# Patient Record
Sex: FEMALE | Race: WHITE | NOT HISPANIC OR LATINO | Employment: OTHER | URBAN - METROPOLITAN AREA
[De-identification: names, ages, dates, MRNs, and addresses within clinical notes are randomized per-mention and may not be internally consistent; named-entity substitution may affect disease eponyms.]

---

## 2017-01-10 ENCOUNTER — TRANSFERRED RECORDS (OUTPATIENT)
Dept: HEALTH INFORMATION MANAGEMENT | Facility: CLINIC | Age: 67
End: 2017-01-10

## 2017-01-10 ENCOUNTER — APPOINTMENT (OUTPATIENT)
Dept: VASCULAR SURGERY | Facility: CLINIC | Age: 67
End: 2017-01-10
Payer: COMMERCIAL

## 2017-01-10 PROCEDURE — 99202 OFFICE O/P NEW SF 15 MIN: CPT | Performed by: SURGERY

## 2017-03-22 ENCOUNTER — OFFICE VISIT (OUTPATIENT)
Dept: INTERNAL MEDICINE | Facility: CLINIC | Age: 67
End: 2017-03-22
Payer: COMMERCIAL

## 2017-03-22 VITALS
SYSTOLIC BLOOD PRESSURE: 122 MMHG | TEMPERATURE: 99 F | DIASTOLIC BLOOD PRESSURE: 88 MMHG | HEIGHT: 63 IN | BODY MASS INDEX: 30.02 KG/M2 | WEIGHT: 169.4 LBS | HEART RATE: 88 BPM | OXYGEN SATURATION: 98 %

## 2017-03-22 DIAGNOSIS — R07.0 THROAT PAIN: Primary | ICD-10-CM

## 2017-03-22 DIAGNOSIS — J40 BRONCHITIS: ICD-10-CM

## 2017-03-22 LAB
DEPRECATED S PYO AG THROAT QL EIA: NORMAL
MICRO REPORT STATUS: NORMAL
SPECIMEN SOURCE: NORMAL

## 2017-03-22 PROCEDURE — 87880 STREP A ASSAY W/OPTIC: CPT | Performed by: FAMILY MEDICINE

## 2017-03-22 PROCEDURE — 87081 CULTURE SCREEN ONLY: CPT | Performed by: FAMILY MEDICINE

## 2017-03-22 PROCEDURE — 99213 OFFICE O/P EST LOW 20 MIN: CPT | Performed by: FAMILY MEDICINE

## 2017-03-22 RX ORDER — AZITHROMYCIN 250 MG/1
TABLET, FILM COATED ORAL
Qty: 6 TABLET | Refills: 0 | Status: SHIPPED | OUTPATIENT
Start: 2017-03-22 | End: 2017-08-01

## 2017-03-22 NOTE — NURSING NOTE
"Chief Complaint   Patient presents with     Pharyngitis     Sore throat, coughing, body aches, chest hurts when breathing, x 2 weeks. Was treated for fluid in ears with amoxicillin x 10 days-finished on 3/20/17.       Initial /88 (BP Location: Right arm, Patient Position: Chair, Cuff Size: Adult Large)  Pulse 88  Temp 99  F (37.2  C) (Oral)  Ht 5' 2.5\" (1.588 m)  Wt 169 lb 6.4 oz (76.8 kg)  SpO2 98%  BMI 30.49 kg/m2 Estimated body mass index is 30.49 kg/(m^2) as calculated from the following:    Height as of this encounter: 5' 2.5\" (1.588 m).    Weight as of this encounter: 169 lb 6.4 oz (76.8 kg).  Medication Reconciliation: complete   Geovanna Coles MA      "

## 2017-03-22 NOTE — PROGRESS NOTES
"CHIEF COMPLAINT    ST, cough      HISTORY    Initially 10 days ago placed on Amox for head congestion and ear fluid. Now having congested cough and chest discomfort. Throat sore. Ears not especially painful.    No asthma.  Non smoker.    Patient Active Problem List   Diagnosis     CARDIOVASCULAR SCREENING; LDL GOAL LESS THAN 160     Advanced directives, counseling/discussion     Dry mouth     Current Outpatient Prescriptions   Medication Sig Dispense Refill     cetirizine (ZYRTEC) 10 MG tablet Take 1 tablet (10 mg) by mouth every evening 30 tablet 1     Krill Oil 1000 MG CAPS        multivitamin, therapeutic with minerals (MULTI-VITAMIN) TABS Take 1 tablet by mouth daily       Turmeric 500 MG CAPS        Probiotic Product (MISC INTESTINAL COLE REGULAT) TABS Take  by mouth.       Cyanocobalamin (VITAMIN B12 PO) Take  by mouth.       CALCIUM + D 600-200 MG-UNIT OR TABS 1 TABLET DAILY 30 0     Omega-3 Fatty Acids (OMEGA-3 FISH OIL PO) Reported on 3/22/2017         REVIEW OF SYSTEMS    No fever  No SOB  No wheeze        Past Medical History:   Diagnosis Date     Chronic pain     sciatic pain left leg to toes     Numbness and tingling     left leg     PONV (postoperative nausea and vomiting)     hx nausea postop       EXAM  /88 (BP Location: Right arm, Patient Position: Chair, Cuff Size: Adult Large)  Pulse 88  Temp 99  F (37.2  C) (Oral)  Ht 5' 2.5\" (1.588 m)  Wt 169 lb 6.4 oz (76.8 kg)  SpO2 98%  BMI 30.49 kg/m2    TM's: neg  Phar: mild red, absent tonsils  Neck: mild incr ant cervical nodes  Chest: cl    Rapid strep neg      (R07.0) Throat pain  (primary encounter diagnosis)  Comment:   Plan: Strep, Rapid Screen, Beta strep group A culture            (J40) Bronchitis  Comment:   Plan: azithromycin (ZITHROMAX) 250 MG tablet          Take prescribed medication as directed.  Continue symptomatic measures.      "

## 2017-03-22 NOTE — MR AVS SNAPSHOT
"              After Visit Summary   3/22/2017    Lolis Villarreal    MRN: [id number]           Patient Information     Date Of Birth          1950        Visit Information        Provider Department      3/22/2017 8:20 AM Eduardo Jauregui MD [facility]        Today's Diagnoses     Throat pain    -  1    Bronchitis          Care Instructions    Take prescribed medication as directed.            Follow-ups after your visit        Who to contact     If you have questions or need follow up information about today's clinic visit or your schedule please contact [facility] directly at [phone].  Normal or non-critical lab and imaging results will be communicated to you by [employer], letter or phone within 4 business days after the clinic has received the results. If you do not hear from us within 7 days, please contact the clinic through [employer] or phone. If you have a critical or abnormal lab result, we will notify you by phone as soon as possible.  Submit refill requests through [facility] or call your pharmacy and they will forward the refill request to us. Please allow 3 business days for your refill to be completed.          Additional Information About Your Visit        MyChart Information     [facility] gives you secure access to your electronic health record. If you see a primary care provider, you can also send messages to your care team and make appointments. If you have questions, please call your primary care clinic.  If you do not have a primary care provider, please call [phone] and they will assist you.        Care EveryWhere ID     This is your Care EveryWhere ID. This could be used by other organizations to access your [facility] medical records  [id number]        Your Vitals Were     Pulse Temperature Height Pulse Oximetry BMI (Body Mass Index)       88 99  F (37.2  C) (Oral) 5' 2.5\" (1.588 m) 98% 30.49 kg/m2        Blood Pressure from Last 3 Encounters: "   03/22/17 122/88   10/03/16 110/80   03/17/16 122/60    Weight from Last 3 Encounters:   03/22/17 169 lb 6.4 oz (76.8 kg)   10/03/16 165 lb (74.8 kg)   03/17/16 158 lb (71.7 kg)              We Performed the Following     Strep, Rapid Screen          Today's Medication Changes          These changes are accurate as of: 3/22/17  8:42 AM.  If you have any questions, ask your nurse or doctor.               Start taking these medicines.        Dose/Directions    azithromycin 250 MG tablet   Commonly known as:  ZITHROMAX   Used for:  Bronchitis   Started by:  Eduardo Jauregui MD        Two tablets first day, then one tablet daily for four days.   Quantity:  6 tablet   Refills:  0            Where to get your medicines      These medications were sent to [facility]/pharmacy #[id number] - [place], [place] - [address] AT [address], [place] [address]     Phone:  [phone]     azithromycin 250 MG tablet                Primary Care Provider Office Phone # Fax #    Manuela Sepulveda [phone]       [facility] [address]  [facility] [address]        Thank you!     Thank you for choosing [facility]  for your care. Our goal is always to provide you with excellent care. Hearing back from our patients is one way we can continue to improve our services. Please take a few minutes to complete the written survey that you may receive in the mail after your visit with us. Thank you!             Your Updated Medication List - Protect others around you: Learn how to safely use, store and throw away your medicines at www.disposemymeds.org.          This list is accurate as of: 3/22/17  8:42 AM.  Always use your most recent med list.                   Brand Name Dispense Instructions for use    azithromycin 250 MG tablet    ZITHROMAX    6 tablet    Two tablets first day, then one tablet daily for four days.       calcium + D 600-200 MG-UNIT  Tabs   Generic drug:  calcium carbonate-vitamin D     30    1 TABLET DAILY       cetirizine 10 MG tablet    zyrTEC    30 tablet    Take 1 tablet (10 mg) by mouth every evening       Krill Oil 1000 MG Caps          Misc Intestinal Lesa Regulat Tabs      Take  by mouth.       Multi-vitamin Tabs tablet      Take 1 tablet by mouth daily       OMEGA-3 FISH OIL PO      Reported on 3/22/2017       Turmeric 500 MG Caps          VITAMIN B12 PO      Take  by mouth.

## 2017-03-24 LAB
BACTERIA SPEC CULT: NORMAL
MICRO REPORT STATUS: NORMAL
SPECIMEN SOURCE: NORMAL

## 2017-07-28 ENCOUNTER — MYC MEDICAL ADVICE (OUTPATIENT)
Dept: INTERNAL MEDICINE | Facility: CLINIC | Age: 67
End: 2017-07-28

## 2017-07-28 DIAGNOSIS — G47.33 OSA (OBSTRUCTIVE SLEEP APNEA): Primary | ICD-10-CM

## 2017-07-28 NOTE — TELEPHONE ENCOUNTER
Please see pt's mychart message below.    Last OV 10-3-16--routine med exam    Please advise, thanks.

## 2017-07-31 PROBLEM — G47.33 OSA (OBSTRUCTIVE SLEEP APNEA): Status: ACTIVE | Noted: 2017-07-31

## 2017-08-01 ENCOUNTER — OFFICE VISIT (OUTPATIENT)
Dept: SLEEP MEDICINE | Facility: CLINIC | Age: 67
End: 2017-08-01
Attending: INTERNAL MEDICINE
Payer: COMMERCIAL

## 2017-08-01 VITALS
BODY MASS INDEX: 28.7 KG/M2 | HEART RATE: 77 BPM | OXYGEN SATURATION: 96 % | HEIGHT: 63 IN | SYSTOLIC BLOOD PRESSURE: 123 MMHG | WEIGHT: 162 LBS | RESPIRATION RATE: 12 BRPM | DIASTOLIC BLOOD PRESSURE: 79 MMHG

## 2017-08-01 DIAGNOSIS — G47.33 OSA (OBSTRUCTIVE SLEEP APNEA): ICD-10-CM

## 2017-08-01 DIAGNOSIS — E66.3 OVERWEIGHT: Primary | ICD-10-CM

## 2017-08-01 PROCEDURE — 99204 OFFICE O/P NEW MOD 45 MIN: CPT | Performed by: INTERNAL MEDICINE

## 2017-08-01 NOTE — PROGRESS NOTES
[facility]  Outpatient Sleep Medicine Consultation  August 1, 2017      Name: Lolis Villarreal MRN# [id number]   Age: 67 year old YOB: 1950     Date of Consultation: August 1, 2017  Consultation is requested by: Manuela Sepulveda MD  [facility]  [address]  [place] [address]  Primary care provider: Manuela Sepulveda  [place] clinic: [facility]       Reason for Sleep Consult:     Loils Villarreal is a 67 year old female for re-evaluation of sleep apnea.         Assessment and Plan:     Summary Sleep Diagnoses/Recommendations:    1. Obstructive Sleep Apnea: she was tired a positional device Zzoma pillow and oral device but not using currently as below.   Discussed with patient the sleep study and treatment options, we recommend Auto-PAP 5-15 cmH2O in addition to weight loss and avoiding sleeping supine position. Patient is recommended to improve his sleep-wake schedule and good sleep hygiene. Patient was advised to use PAP therapy for at least 7-8 hours and during naps (naps are not recommended).  Instructions given. Follow up 4-6 weeks after PAP use.  She is claustrophobic, mask desensitization instructions given.    2. Overweight:  Counseled regarding weight loss through diet modification and increased physical activity. Patient was given instuctions of weight loss and advised to follow up her PCP for further weight loss interventions.        No orders of the defined types were placed in this encounter.        Summary Counseling:  See instructions    Counseling included a comprehensive review of diagnostic and therapeutic strategies as well as risks of inadequate therapy.  Educational materials provided in instructions.    All questions were answered.  The patient indicates understanding of the above issues and agrees with the plan set forth.           History of Present Illness:     Lolis Villarreal is a 67 year old female with  history of mild obstructive sleep apnea who presents to the [facility] in [place] with complains of sleep disturbance. I was asked to see Ms. Alatorre regarding sleep apnea by Dr. Sepulveda. Patient was diagnosed mild sleep apnea on 5/7/2014 and was prescribed positional device Zzoma pillow and oral device ordered by Dr. Barrios. Last sleep clinic visit in [place] 7/3/2014. She used positional device and oral appliance with initial improvement for her sleep and no repeat sleep study was done while using this devices. Patient had TMJ after oral appliance was adjusted many times and not able to use of oral appliance for the last 2 years. She complains snoring, witnessed apnea, excessive daytime sleepiness and tiredness. She wakes many times a night and takes sometime to go back to sleep. She has vivid dreams sometimes. She denies leg restless and kicking.     Please see below for sleep ROS details.    PREVIOUS IN- LAB or HOME SLEEP STUDIES:   Date: 5/7/2014   TYPE: PSG   AHI: 10.3/hr   BMI: 29.8   Intervention: oral device/positional device   Lowest O2 sat: 76.7%    SLEEP-WAKE SCHEDULE:     Lolis Villarreal     -Describes themself as a night person;      -ON WEEKDAYS and ON WEEKENDS, goes to sleep at 11:00 PM during the week; awakens  7:00 AM without an alarm; falls asleep in 30 minutes; denies difficulty falling asleep.      -Awakens >5 times a night for 10-45 minutes before falling back to sleep; awakens to apnea, uncertain reasons and then read.      -Total sleep time: 6 hours per night.    -Naps 0 times/days per week       BEDTIME ACTIVITIES AND SHIFT WORK:    Lolis Villarreal    -does read in bed and does not use electronics in bed and watch TV in bed.     -does not do shift work.  She is retired.       SCALES       SLEEP APNEA: Stopbang score: 5       INSOMNIA:  Insomnia severity score: N/A       SLEEPINESS: [place] sleepiness scale (ESS):  8   Drowsy driving/near accidents: No          PHQ9:  N/A    SLEEP COMPLAINTS:   Snoring- 7 days/week  Witness apnea: Yes  Gasping/Choking: Yes  Excessive daytime sleep: Yes  Toss/turn: No  Excessive tiredness/fatigue:  Yes  Morning headaches: No  Dry mouth/throat: Yes  Dyspnea: No  Coexisting Lung disease: No    Coexisting Heart disease: No    Does patient have a bed partner: Yes  Has bed partner been sleeping separately because of snoring:  No            RLS Screen: When you try to relax in the evening or sleep at  night, do you ever have unpleasant, restless feelings in your  legs that can be relieved by walking or movement? No    Periodic limb movement: No    Narcolepsy:       denies sudden urges of sleep attacks     denies cataplexy     denies sleep paralysis      denies hallucinations     Sleep Behaviors:     denies leg symptoms/movements     denies motor restlessness     denies night terrors     Yes bruxism, uses mouth guard     denies automatic behaviors    Other subjective complaints:     denies anxiety or rumination      denies pain and discomfort at  night     denies waking up with heart pounding or racing     denies GERD/heartburn         Parasomnia:   NREM - denies recurrent persistent confusional arousal, night eating, sleep walking or sleep terrors   REM  - denies dream enactment; injuries. She has occasional vivid dreams and sometimes while dreaming feel real until she wakes up.    Safety: None             Medications:     Current Outpatient Prescriptions   Medication Sig     azithromycin (ZITHROMAX) 250 MG tablet Two tablets first day, then one tablet daily for four days.     cetirizine (ZYRTEC) 10 MG tablet Take 1 tablet (10 mg) by mouth every evening     Krill Oil 1000 MG CAPS      multivitamin, therapeutic with minerals (MULTI-VITAMIN) TABS Take 1 tablet by mouth daily     Turmeric 500 MG CAPS      Omega-3 Fatty Acids (OMEGA-3 FISH OIL PO) Reported on 3/22/2017     Probiotic Product (MISC INTESTINAL COLE REGULAT) TABS Take  by mouth.      Cyanocobalamin (VITAMIN B12 PO) Take  by mouth.     CALCIUM + D 600-200 MG-UNIT OR TABS 1 TABLET DAILY     No current facility-administered medications for this visit.         Medication that can affect sleep: none     Allergies   Allergen Reactions     Dust Mites             Past Medical History:     Does not need 02 supplement at night     Past Medical History:   Diagnosis Date     Chronic pain     sciatic pain left leg to toes     Numbness and tingling     left leg     PONV (postoperative nausea and vomiting)     hx nausea postop               Past Surgical History:     Yes previous upper airway surgery     Past Surgical History:   Procedure Laterality Date     C NONSPECIFIC PROCEDURE      cervical spine surgery      C NONSPECIFIC PROCEDURE      varacose carroll removal right     HYSTERECTOMY, PAP NO LONGER INDICATED       SEPTOPLASTY, TURBINOPLASTY, COMBINED  2012    Procedure:COMBINED SEPTOPLASTY, TURBINOPLASTY; SEPTOPLASTY, TURBINATE Surgery ; Surgeon:OPAL FUENTES; Location:[facility] OR     TONSILLECTOMY & ADENOIDECTOMY  age 6            Social History:     Social History   Substance Use Topics     Smoking status: Never Smoker     Smokeless tobacco: Never Used     Alcohol use No         Chemical History:     Tobacco: No     Uses 3-5 cups/day of tea. Last caffeine intake is usually before 6 pm    EtOH: No   Recreational Drugs: No    Psych Hx:   None    Current dangers to self or others: None           Family History:     Family History   Problem Relation Age of Onset     Cancer - colorectal Mother      born 1927 cancer 77yo and arthritis and thyroid     Alzheimer Disease Mother      Alzheimers/dementia     CEREBROVASCULAR DISEASE Mother 84     HEART DISEASE Father      born 1927 and HTN      CANCER Father 84     brain/lung cancer     DIABETES Sister      Family History Negative Sister      DIABETES Maternal Grandfather      dceased 77yo     Family History Negative Maternal Grandmother       83yo      "CANCER Paternal Grandfather       88yo stomach     Arthritis Paternal Grandmother       93yo     Family History Negative Daughter      Family History Negative Daughter      Respiratory Daughter      asthma     Arthritis Son      rheumatoid arthritis     Arthritis Other         Sleep Family Hx:        RLS- ? father  WENDY - yes, Father and sister  Insomnia - No  Parasomnia - No         Review of Systems:     A complete 10 point review of systems was negative other than HPI or as commented below:   Patient denies chest pain, dyspnea with activity and or rest, wheezing, abdominal pain, n&v, fever, chills, dysuria, leg pain or swelling. Patient is also denies sore throat, postnasal drip, dry cough.  Patient has running nose, ear pain, joint pain and swelling.    Lolis Villarreal has gained 0-5 pounds in the last year.            Physical Examination:   /79  Pulse 77  Resp 12  Ht 1.588 m (5' 2.5\")  Wt 73.5 kg (162 lb)  SpO2 96%  BMI 29.16 kg/m2     Neck Circumference: 37 cm   Constitutional: . Awake, alert, cooperative, in no apparent distress  Mood: euthymic; affect congruent with full range and intensity.  Attention/Concentration:  Normal   Eyes: Pupils round and reactive. No icterus.  ENT: Mallampati Class: III.   Tonsillar Stage: 0  surgically removed  Clear nasal passages. Enlarged inferior turbinates. No deviated septum.  Oropharynx: No high arched palate. No pharyngeal erythema or exudates elongated uvula. No lateral narrowing  Tongue:  macroglossia   Dentition: Good.  Dentures: None  Neck: Supple, no thyroid enlargement.   Cardiovascular: Regular S1 and S2, no gallops or murmurs.   Pulmonary:  Chest symmetric, lungs clear bilaterally and no crackles, wheezes or rales.  Abdomen: Soft, obese, non tender.  Extremities:  No pedal edema.  Muscle/joint: Strength and tone normal   Skin:  No rash or significant lesions.   Neurologic: Alert, oriented x3, no focal neurological deficit.           " Data: All pertinent previous laboratory data reviewed     No results found for: PH, PHARTERIAL, PO2, GU1YLAICCJD, SAT, PCO2, HCO3, BASEEXCESS, SHIMA, BEB  Lab Results   Component Value Date    TSH 1.61 10/03/2016    TSH 1.38 09/21/2015     Lab Results   Component Value Date    GLC 92 10/03/2016    GLC 86 09/21/2015     Lab Results   Component Value Date    HGB 13.7 10/03/2016    HGB 14.0 09/21/2015     Lab Results   Component Value Date    BUN 15 10/03/2016    BUN 15 09/21/2015    CR 0.80 10/03/2016    CR 0.71 09/21/2015     Lab Results   Component Value Date    CO2 30 10/03/2016    CO2 23 09/21/2015     Lab Results   Component Value Date    BEATRIZ 60 05/30/2014         Echocardiography: 12/4/2007  This was a normal stress echocardiogram.  Left ventricular cavity size decreases with exercise.  Global LV systolic function augments with exercise.  Normal resting wall motion and no stress-induced wall motion abnormality.    Chest x-ray: 12/4/2007 0023 HOURS  IMPRESSION:  1. No convincing evidence of active cardiopulmonary disease.  2. Prior cervical spine fusion.  3. Mild degenerative changes present within the thoracic spine.    PFT: No        Copy to: Manuela Sepulveda MD 8/1/2017   [facility] [address], [place], [place] [address] [phone] Clinic    Total time spent with patient: 45 minutes with this patient today in which 25 minutes was spent in counseling/coordination of care and going over planned testing and recommendations.

## 2017-08-01 NOTE — NURSING NOTE
"Chief Complaint   Patient presents with     Consult     SS at [place] 2014.  Needs supplies?       Initial /79  Pulse 77  Resp 12  Ht 1.588 m (5' 2.5\")  Wt 73.5 kg (162 lb)  SpO2 96%  BMI 29.16 kg/m2 Estimated body mass index is 29.16 kg/(m^2) as calculated from the following:    Height as of this encounter: 1.588 m (5' 2.5\").    Weight as of this encounter: 73.5 kg (162 lb).  Medication Reconciliation: complete       Neck 37cm  14.5in  Ess 8      Jillian Espinosa LPN/MA  "

## 2017-08-01 NOTE — MR AVS SNAPSHOT
After Visit Summary   8/1/2017    Lolis Villarreal    MRN: [id number]           Patient Information     Date Of Birth          1950        Visit Information        Provider Department      8/1/2017 11:00 AM Chele Umanzor MD [facility]        Today's Diagnoses     WENDY (obstructive sleep apnea)          Care Instructions    MY TREATMENT INFORMATION FOR SLEEP APNEA-  Lolis Villarrael    MY CONTACT NUMBERS ARE:  [phone]  DOCTOR : Chele HANLEY. Noé  SLEEP CENTER :  [facility]  CPAP EQUIPMENT     You have sleep apnea, CPAP is prescribed for you.   CPAP therapy includes adaptation to a mask interface and a delivery of air pressure.    You will be provided with an auto-titrating CPAP with a pressure range of 5-15 cmH2O with heated humidity to limit nasal congestion. Adjust the heat level on humidifier to find a setting that prevents dry nose but does not cause condensation in the hose or mask. Use distilled water in the humidifier.    The CPAP has a ramp function that starts the pressure lower than your prescribed pressure and gradually increases it over a number of minutes.  This may make it easier to fall asleep.                  Try to use the CPAP every-night, all night, at least 7-8 hours each night.  Daytime naps are not advised, but use CPAP if taking naps. Many insurances require that we prove you are using the CPAP at least 4 hours on at least 70% of nights over a 30 day period. We have 90 days to meet those criteria.    Objective measure goal  Compliance  Goal >70%  Leak   Goal < 10%  AHI  Goal < 5 events per hour   Usage  Goal >420 minutes       Patient was advised not to drive if drowsy or sleepy.                Discussed weight management and the impact of weight gain on sleep apnea.  Let me know if you snore or feel the pressure is too high.    You can get new supplies (mask, hose and filter) for your CPAP every 3-6 months, covered by insurance. You do  "not need to get supplies that often, but they are available if you would like them.  You may exchange the mask once within the first month if you feel the initial mask does not fit well.  Contact your medical equipment provider for equipment issues.  Please let me know if you have any return of snoring, daytime sleepiness or poor sleep quality. We will want to make sure your CPAP is adequately treating your apnea.  There is a website called CPAP.com that has accessories that may make CPAP use easier. Please visit it at your convenience.    Our phone number is [phone]    Follow-up 4-6 weeks after PAP usage.  Bring your CPAP machine with you to the follow up appointment.               Frequently asked questions:  1. What is Obstructive Sleep Apnea (WENDY)? WENDY is the most common type of sleep apnea. Apnea literally means, \"without breath.\" It is characterized by repetitive pauses in breathing, despite continued effort to breathe, and is usually associated with a reduction in blood oxygen saturation. Apneas can last 10 to over 60 seconds. It is caused by narrowing or collapse of the upper airway as muscles relax during sleep. Severity of sleep apnea is determined by frequency of breathing events and their effect on your sleep and oxygen levels determined during sleep testing.   2. What are the consequences of WENDY? Symptoms include: daytime sleepiness- possibly increasing the risk of falling asleep while driving, unrefreshing/restless sleep, snoring, insomnia, waking frequently to urinate, waking with heartburn or reflux, reduced concentration and memory, and morning headaches. Other health consequences may include development of high blood pressure and other cardiovascular disease in persons who are susceptible. Untreated WENDY  can contribute to heart disease, stroke and diabetes.   3. What are the treatment options? In most situations, sleep apnea is a lifelong disease that must be managed with daily therapy. " Medications are not effective for sleep apnea and surgery is generally not performed until other therapies have been tried. Therapy is usually tailored to the individual patient based on many factors including your wishes as well as severity of sleep apnea and severity of obesity. Continuous Positive Airway (CPAP) is the most reliable treatment. An oral device to hold your jaw forward is usually      IF I HAVE SLEEP APNEA.....  WHERE CAN I FIND MORE INFORMATION?    American Academy of Sleep Medicine Patient information on sleep disorders:  http://yoursleep.aasmnet.org    THINGS I SHOULD REMEMBER  In most situations, sleep apnea is a lifelong disease that must be managed with daily therapy. Untreated disease, when severe, may result in an increased risk for an array of problems from heart disease to mood changes, car accidents and shorter lifespan.    CPAP-  WHY AND HOW?                                    Continuous positive airway pressure, or CPAP, is the most effective treatment for obstructive sleep apnea. A decision to use CPAP is a major step forward in the pursuit of a healthier life. The successful use of CPAP will help you breathe easier, sleep better and live healthier. Using CPAP can be a positive experience if you keep these hsu points in mind:  1. Commitment  CPAP is not a quick fix for your problem. It involves a long-term commitment to improve your sleep and your health.    2. Communication  Stay in close communication with both your sleep doctor and your CPAP supplier. Ask lots of questions and seek help when you need it.    3. Consistency  Use CPAP all night, every night and for every nap. You will receive the maximum health benefits from CPAP when you use it every time that you sleep. This will also make it easier for your body to adjust to the treatment.    4. Correction  The first machine and mask that you try may not be the best ones for you. Work with your sleep doctor and your CPAP supplier to  "make corrections to your equipment selection. Ask about trying a different type of machine or mask if you have ongoing problems. Make sure that your mask is a good fit and learn to use your equipment properly.    5. Challenge  Tell a family member or close friend to ask you each morning if you used your CPAP the previous night. Have someone to challenge you to give it your best effort.    6. Connection   Your adjustment to CPAP will be easier if you are able to connect with others who use the same treatment. Ask your sleep doctor if there is a support group in your area for people who have sleep apnea, or look for one on the Internet.    7. Comfort   Increase your level of comfort by using a saline spray, decongestant or heated humidifier if CPAP irritates your nose, mouth or throat. Use your unit's \"ramp\" setting to slowly get used to the air pressure level. There may be soft pads you can buy that will fit over your mask straps. Look on www.CPAP.com for accessories such as these straps, a pillow contoured for side-sleeping with CPAP, longer hoses, hose covers to reduce condensation, or stands to keep the hose out of your way.                                                              8. Cleaning   Clean your mask, tubing and headgear on a regular basis. Put this time in your schedule so that you don't forget to do it. Check and replace the filters for your CPAP unit and humidifier.    9. Completion   Although you are never finished with CPAP therapy, you should reward yourself by celebrating the completion of your first month of treatment. Expect this first month to be your hardest period of adjustment. It will involve some trial and error as you find the machine, mask and pressure settings that are right for you.    10. Continuation  After your first month of treatment, continue to make a daily commitment to use your CPAP all night, every night and for every nap.    CPAP-Tips to starting with success:  Begin " using your CPAP for short periods of time during the day while you watch TV or read.    Use CPAP every night and for every nap. Using it less often reduces the health benefits and makes it harder for your body to get used to it.    Newer CPAP models are virtually silent; however, if you find the sound of your CPAP machine to be bothersome, place the unit under your bed to dampen the sound.     Make small adjustments to your mask, tubing, straps and headgear until you get the right fit. Tightening the mask may actually worsen the leak.  If it leaves significant marks on your face or irritates the bridge of your nose, it may not be the best mask for you.  Speak with the person who supplied the mask and consider trying other masks.    Use a saline nasal spray to ease mild nasal congestion. Neti-Pot or saline nasal rinses may also help. Nasal gel sprays can help reduce nasal dryness.  Biotene mouthwash can be helpful to protect your teeth if you experience frequent dry mouth.  Dry mouth may be a sign of air escaping out of your mouth or out of the mask in the case of a full face mask.  Speak with your provider if you expect that is the case.     Take a nasal decongestant to relieve more severe nasal or sinus congestion.  Do not use Afrin (oxymetazoline) nasal spray more than 3 days in a row.  Speak with your sleep doctor if your nasal congestion is chronic.    Use a heated humidifier that fits your CPAP model to enhance your breathing comfort. Adjust the heat setting up if you get a dry nose or throat, down if you get condensation in the hose or mask.  Position the CPAP lower than you so that any condensation in the hose drains back into the machine rather than towards the mask.    Try a system that uses nasal pillows if traditional masks give you problems.    Clean your mask, tubing and headgear once a week. Make sure the equipment dries fully.    Regularly check and replace the filters for your CPAP unit and  "humidifier.    Work closely with your sleep doctor and your CPAP supplier to make sure that you have the machine, mask and air pressure setting that works best for you.        MASK DESENSITIZATION:    If you are experiencing some anxiety about trying a PAP mask or breathing with pressurized air try the following steps in sequence to get used to PAP. This is called \"desensitization\".    1.  Wear mask (disconnected from the device) for 60 minutes daily awake and use a distraction: Sit and watch TV, read, or listen to music with the mask on. Take the mask off and put it back on, as needed. This can be in a chair in the living room, for example.    2.  When you can use the mask without taking it off for 60 minutes and without anxiety, then proceed to step 3.    3.  Attach the mask to the PAP device, and switch the unit  on  and practice breathing through the mask for one hour while watching television, reading or performing some other sedentary, distracting activity.     4.  Once you are comfortable wearing PAP for 30-60 minutes without anxiety while distracted with an activity, try to use it in bed. You can continue distraction in bed by watching TV, reading, listening to music or an audio book, etc.  The main goal is to  not think about using PAP  but pay attention to relaxing.    5. Use the PAP during scheduled one-hour naps at home.    6. Use PAP during initial 3-4 hours of nocturnal sleep.    7. Use PAP through an entire night of sleep.      Your BMI is Body mass index is 29.16 kg/(m^2).  Weight management is a personal decision.  If you are interested in exploring weight loss strategies, the following discussion covers the approaches that may be successful. Body mass index (BMI) is one way to tell whether you are at a healthy weight, overweight, or obese. It measures your weight in relation to your height.  A BMI of 18.5 to 24.9 is in the healthy range. A person with a BMI of 25 to 29.9 is considered overweight, " and someone with a BMI of 30 or greater is considered obese. More than two-thirds of American adults are considered overweight or obese.  Being overweight or obese increases the risk for further weight gain. Excess weight may lead to heart disease and diabetes.  Creating and following plans for healthy eating and physical activity may help you improve your health.  Weight control is part of healthy lifestyle and includes exercise, emotional health, and healthy eating habits. Careful eating habits lifelong are the mainstay of weight control. Though there are significant health benefits from weight loss, long-term weight loss with diet alone may be very difficult to achieve- studies show long-term success with dietary management in less than 10% of people. Attaining a healthy weight may be especially difficult to achieve in those with severe obesity. In some cases, medications, devices and surgical management might be considered.  What can you do?  If you are overweight or obese and are interested in methods for weight loss, you should discuss this with your provider.     Consider reducing daily calorie intake by 500 calories.     Keep a food journal.     Avoiding skipping meals, consider cutting portions instead.    Diet combined with exercise helps maintain muscle while optimizing fat loss. Strength training is particularly important for building and maintaining muscle mass. Exercise helps reduce stress, increase energy, and improves fitness. Increasing exercise without diet control, however, may not burn enough calories to loose weight.       Start walking three days a week 10-20 minutes at a time    Work towards walking thirty minutes five days a week     Eventually, increase the speed of your walking for 1-2 minutes at time    In addition, we recommend that you review healthy lifestyles and methods for weight loss available through the National Institutes of Health patient information  sites:  http://win.niddk.nih.gov/publications/index.htm    And look into health and wellness programs that may be available through your health insurance provider, employer, local community center, or zaki club.    Weight management plan: Patient was referred to their PCP to discuss a diet and exercise plan.     Your blood pressure was checked while you were in clinic today.  Please read the guidelines below about what these numbers mean and what you should do about them.  Your systolic blood pressure is the top number.  This is the pressure when the heart is pumping.  Your diastolic blood pressure is the bottom number.  This is the pressure in between beats.  If your systolic blood pressure is less than 120 and your diastolic blood pressure is less than 80, then your blood pressure is normal. There is nothing more that you need to do about it  If your systolic blood pressure is 120-139 or your diastolic blood pressure is 80-89, your blood pressure may be higher than it should be.  You should have your blood pressure re-checked within a year by a primary care provider.  If your systolic blood pressure is 140 or greater or your diastolic blood pressure is 90 or greater, you may have high blood pressure.  High blood pressure is treatable, but if left untreated over time it can put you at risk for heart attack, stroke, or kidney failure.  You should have your blood pressure re-checked by a primary care provider within the next four weeks.                Follow-ups after your visit        Who to contact     If you have questions or need follow up information about today's clinic visit or your schedule please contact [facility] [place] [phone].  Normal or non-critical lab and imaging results will be communicated to you by MyChart, letter or phone within 4 business days after the clinic has received the results. If you do not hear from us within 7 days, please contact the clinic through  "MyChart or phone. If you have a critical or abnormal lab result, we will notify you by phone as soon as possible.  Submit refill requests through [employer] or call your pharmacy and they will forward the refill request to us. Please allow 3 business days for your refill to be completed.          Additional Information About Your Visit        [employer] Information     [employer] gives you secure access to your electronic health record. If you see a primary care provider, you can also send messages to your care team and make appointments. If you have questions, please call your primary care clinic.  If you do not have a primary care provider, please call [phone] and they will assist you.        Care EveryWhere ID     This is your Care EveryWhere ID. This could be used by other organizations to access your [facility] medical records  [id number]        Your Vitals Were     Pulse Respirations Height Pulse Oximetry BMI (Body Mass Index)       77 12 1.588 m (5' 2.5\") 96% 29.16 kg/m2        Blood Pressure from Last 3 Encounters:   08/01/17 123/79   03/22/17 122/88   10/03/16 110/80    Weight from Last 3 Encounters:   08/01/17 73.5 kg (162 lb)   03/22/17 76.8 kg (169 lb 6.4 oz)   10/03/16 74.8 kg (165 lb)              We Performed the Following     Comprehensive DME     SLEEP EVALUATION & MANAGEMENT REFERRAL - ADULT          Today's Medication Changes          These changes are accurate as of: 8/1/17 11:42 AM.  If you have any questions, ask your nurse or doctor.               Stop taking these medicines if you haven't already. Please contact your care team if you have questions.     azithromycin 250 MG tablet   Commonly known as:  ZITHROMAX                    Primary Care Provider Office Phone # Fax #    Manuela Sepulveda [phone]       [facility] [address]   [facility] [address]        Equal Access to Services     REBECCA MYERS AH: eunice Moffett, " brando mitchell jenaebonita ng ah. So [facility] [phone].    ATENCIÓN: Si tonny guardado, tiene a vuong disposición servicios gratuitos de asistencia lingüística. Laura al [phone].    We comply with applicable federal civil rights laws and [place] laws. We do not discriminate on the basis of race, color, national origin, age, disability sex, sexual orientation or gender identity.            Thank you!     Thank you for choosing [facility]  for your care. Our goal is always to provide you with excellent care. Hearing back from our patients is one way we can continue to improve our services. Please take a few minutes to complete the written survey that you may receive in the mail after your visit with us. Thank you!             Your Updated Medication List - Protect others around you: Learn how to safely use, store and throw away your medicines at www.disposemymeds.org.          This list is accurate as of: 8/1/17 11:42 AM.  Always use your most recent med list.                   Brand Name Dispense Instructions for use Diagnosis    calcium + D 600-200 MG-UNIT Tabs   Generic drug:  calcium carbonate-vitamin D     30    1 TABLET DAILY    Preventative health care       cetirizine 10 MG tablet    zyrTEC    30 tablet    Take 1 tablet (10 mg) by mouth every evening        Krill Oil 1000 MG Caps           LOW-DOSE ASPIRIN PO      Take 81 mg by mouth daily        Misc Intestinal Lesa Regulat Tabs      Take  by mouth.    Routine general medical examination at a health care facility       Multi-vitamin Tabs tablet      Take 1 tablet by mouth daily        OMEGA-3 FISH OIL PO      Reported on 3/22/2017        Turmeric 500 MG Caps           vitamin B complex with vitamin C Tabs tablet      Take 1 tablet by mouth daily        VITAMIN B12 PO      Take  by mouth.

## 2017-08-01 NOTE — PATIENT INSTRUCTIONS
MY TREATMENT INFORMATION FOR SLEEP APNEA-  Lolis Villarreal    MY CONTACT NUMBERS ARE:  [phone]  DOCTOR : [facility]  SLEEP CENTER :  [place]  CPAP EQUIPMENT     You have sleep apnea, CPAP is prescribed for you.   CPAP therapy includes adaptation to a mask interface and a delivery of air pressure.    You will be provided with an auto-titrating CPAP with a pressure range of 5-15 cmH2O with heated humidity to limit nasal congestion. Adjust the heat level on humidifier to find a setting that prevents dry nose but does not cause condensation in the hose or mask. Use distilled water in the humidifier.    The CPAP has a ramp function that starts the pressure lower than your prescribed pressure and gradually increases it over a number of minutes.  This may make it easier to fall asleep.                  Try to use the CPAP every-night, all night, at least 7-8 hours each night.  Daytime naps are not advised, but use CPAP if taking naps. Many insurances require that we prove you are using the CPAP at least 4 hours on at least 70% of nights over a 30 day period. We have 90 days to meet those criteria.    Objective measure goal  Compliance  Goal >70%  Leak   Goal < 10%  AHI  Goal < 5 events per hour   Usage  Goal >420 minutes       Patient was advised not to drive if drowsy or sleepy.                Discussed weight management and the impact of weight gain on sleep apnea.  Let me know if you snore or feel the pressure is too high.    You can get new supplies (mask, hose and filter) for your CPAP every 3-6 months, covered by insurance. You do not need to get supplies that often, but they are available if you would like them.  You may exchange the mask once within the first month if you feel the initial mask does not fit well.  Contact your medical equipment provider for equipment issues.  Please let me know if you have any return of snoring, daytime sleepiness or poor sleep quality. We will want to make sure  "your CPAP is adequately treating your apnea.  There is a website called CPAP.com that has accessories that may make CPAP use easier. Please visit it at your convenience.    Our phone number is [phone]    Follow-up 4-6 weeks after PAP usage.  Bring your CPAP machine with you to the follow up appointment.               Frequently asked questions:  1. What is Obstructive Sleep Apnea (WENDY)? WENDY is the most common type of sleep apnea. Apnea literally means, \"without breath.\" It is characterized by repetitive pauses in breathing, despite continued effort to breathe, and is usually associated with a reduction in blood oxygen saturation. Apneas can last 10 to over 60 seconds. It is caused by narrowing or collapse of the upper airway as muscles relax during sleep. Severity of sleep apnea is determined by frequency of breathing events and their effect on your sleep and oxygen levels determined during sleep testing.   2. What are the consequences of WENDY? Symptoms include: daytime sleepiness- possibly increasing the risk of falling asleep while driving, unrefreshing/restless sleep, snoring, insomnia, waking frequently to urinate, waking with heartburn or reflux, reduced concentration and memory, and morning headaches. Other health consequences may include development of high blood pressure and other cardiovascular disease in persons who are susceptible. Untreated WENDY  can contribute to heart disease, stroke and diabetes.   3. What are the treatment options? In most situations, sleep apnea is a lifelong disease that must be managed with daily therapy. Medications are not effective for sleep apnea and surgery is generally not performed until other therapies have been tried. Therapy is usually tailored to the individual patient based on many factors including your wishes as well as severity of sleep apnea and severity of obesity. Continuous Positive Airway (CPAP) is the most reliable treatment. An oral device to hold your jaw " forward is usually      IF I HAVE SLEEP APNEA.....  WHERE CAN I FIND MORE INFORMATION?    American Academy of Sleep Medicine Patient information on sleep disorders:  http://yoursleep.aasmnet.org    THINGS I SHOULD REMEMBER  In most situations, sleep apnea is a lifelong disease that must be managed with daily therapy. Untreated disease, when severe, may result in an increased risk for an array of problems from heart disease to mood changes, car accidents and shorter lifespan.    CPAP-  WHY AND HOW?                                    Continuous positive airway pressure, or CPAP, is the most effective treatment for obstructive sleep apnea. A decision to use CPAP is a major step forward in the pursuit of a healthier life. The successful use of CPAP will help you breathe easier, sleep better and live healthier. Using CPAP can be a positive experience if you keep these hsu points in mind:  1. Commitment  CPAP is not a quick fix for your problem. It involves a long-term commitment to improve your sleep and your health.    2. Communication  Stay in close communication with both your sleep doctor and your CPAP supplier. Ask lots of questions and seek help when you need it.    3. Consistency  Use CPAP all night, every night and for every nap. You will receive the maximum health benefits from CPAP when you use it every time that you sleep. This will also make it easier for your body to adjust to the treatment.    4. Correction  The first machine and mask that you try may not be the best ones for you. Work with your sleep doctor and your CPAP supplier to make corrections to your equipment selection. Ask about trying a different type of machine or mask if you have ongoing problems. Make sure that your mask is a good fit and learn to use your equipment properly.    5. Challenge  Tell a family member or close friend to ask you each morning if you used your CPAP the previous night. Have someone to challenge you to give it your best  "effort.    6. Connection   Your adjustment to CPAP will be easier if you are able to connect with others who use the same treatment. Ask your sleep doctor if there is a support group in your area for people who have sleep apnea, or look for one on the Internet.    7. Comfort   Increase your level of comfort by using a saline spray, decongestant or heated humidifier if CPAP irritates your nose, mouth or throat. Use your unit's \"ramp\" setting to slowly get used to the air pressure level. There may be soft pads you can buy that will fit over your mask straps. Look on www.CPAP.com for accessories such as these straps, a pillow contoured for side-sleeping with CPAP, longer hoses, hose covers to reduce condensation, or stands to keep the hose out of your way.                                                              8. Cleaning   Clean your mask, tubing and headgear on a regular basis. Put this time in your schedule so that you don't forget to do it. Check and replace the filters for your CPAP unit and humidifier.    9. Completion   Although you are never finished with CPAP therapy, you should reward yourself by celebrating the completion of your first month of treatment. Expect this first month to be your hardest period of adjustment. It will involve some trial and error as you find the machine, mask and pressure settings that are right for you.    10. Continuation  After your first month of treatment, continue to make a daily commitment to use your CPAP all night, every night and for every nap.    CPAP-Tips to starting with success:  Begin using your CPAP for short periods of time during the day while you watch TV or read.    Use CPAP every night and for every nap. Using it less often reduces the health benefits and makes it harder for your body to get used to it.    Newer CPAP models are virtually silent; however, if you find the sound of your CPAP machine to be bothersome, place the unit under your bed to dampen " "the sound.     Make small adjustments to your mask, tubing, straps and headgear until you get the right fit. Tightening the mask may actually worsen the leak.  If it leaves significant marks on your face or irritates the bridge of your nose, it may not be the best mask for you.  Speak with the person who supplied the mask and consider trying other masks.    Use a saline nasal spray to ease mild nasal congestion. Neti-Pot or saline nasal rinses may also help. Nasal gel sprays can help reduce nasal dryness.  Biotene mouthwash can be helpful to protect your teeth if you experience frequent dry mouth.  Dry mouth may be a sign of air escaping out of your mouth or out of the mask in the case of a full face mask.  Speak with your provider if you expect that is the case.     Take a nasal decongestant to relieve more severe nasal or sinus congestion.  Do not use Afrin (oxymetazoline) nasal spray more than 3 days in a row.  Speak with your sleep doctor if your nasal congestion is chronic.    Use a heated humidifier that fits your CPAP model to enhance your breathing comfort. Adjust the heat setting up if you get a dry nose or throat, down if you get condensation in the hose or mask.  Position the CPAP lower than you so that any condensation in the hose drains back into the machine rather than towards the mask.    Try a system that uses nasal pillows if traditional masks give you problems.    Clean your mask, tubing and headgear once a week. Make sure the equipment dries fully.    Regularly check and replace the filters for your CPAP unit and humidifier.    Work closely with your sleep doctor and your CPAP supplier to make sure that you have the machine, mask and air pressure setting that works best for you.        MASK DESENSITIZATION:    If you are experiencing some anxiety about trying a PAP mask or breathing with pressurized air try the following steps in sequence to get used to PAP. This is called \"desensitization\".    1. "  Wear mask (disconnected from the device) for 60 minutes daily awake and use a distraction: Sit and watch TV, read, or listen to music with the mask on. Take the mask off and put it back on, as needed. This can be in a chair in the living room, for example.    2.  When you can use the mask without taking it off for 60 minutes and without anxiety, then proceed to step 3.    3.  Attach the mask to the PAP device, and switch the unit  on  and practice breathing through the mask for one hour while watching television, reading or performing some other sedentary, distracting activity.     4.  Once you are comfortable wearing PAP for 30-60 minutes without anxiety while distracted with an activity, try to use it in bed. You can continue distraction in bed by watching TV, reading, listening to music or an audio book, etc.  The main goal is to  not think about using PAP  but pay attention to relaxing.    5. Use the PAP during scheduled one-hour naps at home.    6. Use PAP during initial 3-4 hours of nocturnal sleep.    7. Use PAP through an entire night of sleep.      Your BMI is Body mass index is 29.16 kg/(m^2).  Weight management is a personal decision.  If you are interested in exploring weight loss strategies, the following discussion covers the approaches that may be successful. Body mass index (BMI) is one way to tell whether you are at a healthy weight, overweight, or obese. It measures your weight in relation to your height.  A BMI of 18.5 to 24.9 is in the healthy range. A person with a BMI of 25 to 29.9 is considered overweight, and someone with a BMI of 30 or greater is considered obese. More than two-thirds of American adults are considered overweight or obese.  Being overweight or obese increases the risk for further weight gain. Excess weight may lead to heart disease and diabetes.  Creating and following plans for healthy eating and physical activity may help you improve your health.  Weight control is part of  healthy lifestyle and includes exercise, emotional health, and healthy eating habits. Careful eating habits lifelong are the mainstay of weight control. Though there are significant health benefits from weight loss, long-term weight loss with diet alone may be very difficult to achieve- studies show long-term success with dietary management in less than 10% of people. Attaining a healthy weight may be especially difficult to achieve in those with severe obesity. In some cases, medications, devices and surgical management might be considered.  What can you do?  If you are overweight or obese and are interested in methods for weight loss, you should discuss this with your provider.     Consider reducing daily calorie intake by 500 calories.     Keep a food journal.     Avoiding skipping meals, consider cutting portions instead.    Diet combined with exercise helps maintain muscle while optimizing fat loss. Strength training is particularly important for building and maintaining muscle mass. Exercise helps reduce stress, increase energy, and improves fitness. Increasing exercise without diet control, however, may not burn enough calories to loose weight.       Start walking three days a week 10-20 minutes at a time    Work towards walking thirty minutes five days a week     Eventually, increase the speed of your walking for 1-2 minutes at time    In addition, we recommend that you review healthy lifestyles and methods for weight loss available through the National Institutes of Health patient information sites:  http://win.niddk.nih.gov/publications/index.htm    And look into health and wellness programs that may be available through your health insurance provider, employer, local community center, or zaki club.    Weight management plan: Patient was referred to their PCP to discuss a diet and exercise plan.     Your blood pressure was checked while you were in clinic today.  Please read the guidelines below about  what these numbers mean and what you should do about them.  Your systolic blood pressure is the top number.  This is the pressure when the heart is pumping.  Your diastolic blood pressure is the bottom number.  This is the pressure in between beats.  If your systolic blood pressure is less than 120 and your diastolic blood pressure is less than 80, then your blood pressure is normal. There is nothing more that you need to do about it  If your systolic blood pressure is 120-139 or your diastolic blood pressure is 80-89, your blood pressure may be higher than it should be.  You should have your blood pressure re-checked within a year by a primary care provider.  If your systolic blood pressure is 140 or greater or your diastolic blood pressure is 90 or greater, you may have high blood pressure.  High blood pressure is treatable, but if left untreated over time it can put you at risk for heart attack, stroke, or kidney failure.  You should have your blood pressure re-checked by a primary care provider within the next four weeks.

## 2017-08-07 DIAGNOSIS — Z53.9 ERRONEOUS ENCOUNTER--DISREGARD: ICD-10-CM

## 2017-08-07 DIAGNOSIS — Z12.31 VISIT FOR SCREENING MAMMOGRAM: Primary | ICD-10-CM

## 2017-08-10 ENCOUNTER — DOCUMENTATION ONLY (OUTPATIENT)
Dept: SLEEP MEDICINE | Facility: CLINIC | Age: 67
End: 2017-08-10

## 2017-08-10 NOTE — PROGRESS NOTES
Patient was offered choice of vendor and chose [facility].  Patient Lolis Hartamning was set up at [place] on August 10, 2017. Patient received a Resmed AirSense 10 Auto. Pressures were set at 5-15 cm H2O.   Patient s ramp is 5 cm H2O for Auto and FLEX/EPR is EPR, 2.  Patient received a [employer] Mask name: dreamwear  Nasal mask Size Small, heated tubing and heated humidifier.  Patient is enrolled in the STM Program and does not need to meet compliance. Patient has a follow up on 09/14/2017 with Dr. Umanzor.    Heidi Balbuena

## 2017-08-14 ENCOUNTER — DOCUMENTATION ONLY (OUTPATIENT)
Dept: SLEEP MEDICINE | Facility: CLINIC | Age: 67
End: 2017-08-14

## 2017-08-14 NOTE — PROGRESS NOTES
3 DAY STM VISIT    Patient contacted for 3 day STM visit  Message left for patient to return call     Device type: Auto-CPAP  PAP settings: CPAP min 5 cm  H20     CPAP max 15 cm  H20  Assessment: Nightly usage over four hours.  Action plan: Pt to have f/u 14 day STM visit.  Diagnostic AHI: 10.3

## 2017-08-14 NOTE — PROGRESS NOTES
Patient returned call.     Subjective measures: Pt states things are going well and has no issues or complaints.  Pt is benefiting from therapy. Patient meeting subjective benchmarks.     Action plan:Pt to have 14 day [facility] visit.

## 2017-08-28 ENCOUNTER — DOCUMENTATION ONLY (OUTPATIENT)
Dept: SLEEP MEDICINE | Facility: CLINIC | Age: 67
End: 2017-08-28

## 2017-08-28 NOTE — PROGRESS NOTES
14 DAY STM VISIT    Message left for patient to return call     Assessment: Pt meeting objective benchmarks.     Action plan: waiting for patient to return call.  and pt to have 30 day STM visit.    Device type: Auto-CPAP  PAP settings: CPAP min 5 cm  H20     CPAP max 15 cm  H20    95th% pressure 8.6 cm  H20   Objective measures: 14 day rolling measures      Compliance  100 %      Leak  17.12 lpm  last  upload      AHI 2.01   last  upload      Average number of minutes 499    Diagnostic AHI: 10.3    Objective measure goal  Compliance   Goal >70%  Leak   Goal < 24 lpm  AHI  Goal < 5  Usage  Goal >240

## 2017-09-11 ENCOUNTER — DOCUMENTATION ONLY (OUTPATIENT)
Dept: SLEEP MEDICINE | Facility: CLINIC | Age: 67
End: 2017-09-11

## 2017-09-11 NOTE — PROGRESS NOTES
30 DAY [facility] VISIT    Message left for patient to return call     Assessment: Pt meeting objective benchmarks.     Action plan: waiting for patient to return call and pt to have 6 month. [facility] visit  Patient has a follow up visit with Dr. Umanzor on 9/14/2017.   Device type: Auto-CPAP  PAP settings: CPAP min 5 cm  H20     CPAP max 15 cm  H20    95th% pressure 8.9 cm  H20   Objective measures: 14 day rolling measures      Compliance  100 %      Leak  15.44 lpm  last  upload      AHI 2.05   last  upload      Average number of minutes 481          Objective measure goal  Compliance   Goal >70%  Leak   Goal < 24 lpm  AHI  Goal < 5  Usage  Goal >240

## 2017-09-14 ENCOUNTER — OFFICE VISIT (OUTPATIENT)
Dept: SLEEP MEDICINE | Facility: CLINIC | Age: 67
End: 2017-09-14
Payer: COMMERCIAL

## 2017-09-14 VITALS
BODY MASS INDEX: 28.7 KG/M2 | SYSTOLIC BLOOD PRESSURE: 106 MMHG | OXYGEN SATURATION: 97 % | HEIGHT: 63 IN | DIASTOLIC BLOOD PRESSURE: 67 MMHG | HEART RATE: 84 BPM | WEIGHT: 162 LBS | RESPIRATION RATE: 10 BRPM

## 2017-09-14 DIAGNOSIS — G47.33 OSA (OBSTRUCTIVE SLEEP APNEA): Primary | ICD-10-CM

## 2017-09-14 PROCEDURE — 99214 OFFICE O/P EST MOD 30 MIN: CPT | Performed by: INTERNAL MEDICINE

## 2017-09-14 NOTE — NURSING NOTE
"Chief Complaint   Patient presents with     RECHECK     f/u pap       Initial /67  Pulse 84  Resp 10  Ht 1.588 m (5' 2.52\")  Wt 73.5 kg (162 lb)  SpO2 97%  BMI 29.14 kg/m2 Estimated body mass index is 29.14 kg/(m^2) as calculated from the following:    Height as of this encounter: 1.588 m (5' 2.52\").    Weight as of this encounter: 73.5 kg (162 lb).  Medication Reconciliation: complete     Jovita Hoff CNA, Sleep Clinic Specialist  "

## 2017-09-14 NOTE — PROGRESS NOTES
[facility]  Outpatient Sleep Medicine Follow-up  September 14, 2017      Name: Lolis Villarreal MRN# [id number]   Age: 67 year old YOB: 1950   Date of visit: September 14, 2017  Primary care provider: Manuela Sepulveda         Assessment and Plan:     1. Obstructive Sleep Apnea:  - Full compliance of PAP use.  - Clinical response: good  - Recommend using CPAP every night for at least 7-8 hours each night  - Daytime naps are not advised, but use CPAP if taking naps  - Patient was advised not to drive if drowsy or sleepy.  - Follow up in sleep clinic in 12 months    2. Overweight: Encouraged patient to lose weight. Counseled regarding weight loss through diet modification and increased physical activity. Patient was given nstuctions of weight loss and advised to follow up her PCP for further weight loss interventions. Weight loss instructions given.      No orders of the defined types were placed in this encounter.        Summary Counseling:  New sleep schedule recommendation: given    Check out [email or web address]    All questions were answered.  The patient indicates understanding of the above issues and agrees with the plan set forth.           Chief Complaint:    Routine Follow up            History of Present Illness:     Lolis Villarreal is a 67 year old female is a 67 year old female with history of mild obstructive sleep apnea who comes to [facility] for follow up.  Patient was diagnosed mild sleep apnea on 5/7/2014 and was prescribed positional device Zzoma pillow and oral device ordered by Dr. Barrios. Last sleep clinic visit in [place] 7/3/2014. She used positional device and oral appliance with initial improvement for her sleep and no repeat sleep study was done while using this devices. Patient had TMJ after oral appliance was adjusted many times and not able to use of oral appliance for the last 2 years.  She was prescribed auto-CPAP, which was  setup on 8/10/17, and she uses every night and compliant 100%. She has good benefits for PAP use and no complaints.      PREVIOUS SLEEP STUDIES:  Date: 5/7/2014  TYPE: PSG  AHI: 10.3/hr  BMI: 29.8  Intervention: auto-CPAP  Lowest O2 sat: 76.7%    CPAP Compliance:  Dates: 8/14 /2017-9/12 /2017       100 % >4hour use   Days used: 30/30  Average daily use (days used): 8.7 hrs  Leak 95 percentile: 16.6L/min  Residual AHI: 2.1/hr  Pressure:  5-15 cmH2O  95% percentile pressure: 8.7 cmH2O    Ronda Sleepiness Scale score today: 0/24   Pre-PAP Ronda Sleepiness Scale score: 8/24    PAP machine: ResMed/Resperonics.    Interface:  Mask: nasal mask  Chin strap: No  Leak: No  Humidity: Yes    Difficulties with therapy:    [-] Difficulty tolerating the pressure  [-] Epistaxis:   [-] Nasal congestion   [-] Dry mouth:  [-] Mouth breathing:   [-] Pain/skin breakdown:     Improvements noted with CPAP:   [+] waking up more refreshed  [+] sleeping better with less arousals  [+] nocturia improved   [+] improved energy level during the day    SLEEP-WAKE SCHEDULE: unchanged and better    Other sleep problems: None     Drowsy driving / near incidents: No    Medications that affect sleep: No            Medications:     Current Outpatient Prescriptions   Medication Sig     LOW-DOSE ASPIRIN PO Take 81 mg by mouth daily     vitamin B complex with vitamin C (VITAMIN  B COMPLEX) TABS tablet Take 1 tablet by mouth daily     cetirizine (ZYRTEC) 10 MG tablet Take 1 tablet (10 mg) by mouth every evening     Krill Oil 1000 MG CAPS      multivitamin, therapeutic with minerals (MULTI-VITAMIN) TABS Take 1 tablet by mouth daily     Turmeric 500 MG CAPS      Omega-3 Fatty Acids (OMEGA-3 FISH OIL PO) Reported on 3/22/2017     Probiotic Product (MISC INTESTINAL COLE REGULAT) TABS Take  by mouth.     Cyanocobalamin (VITAMIN B12 PO) Take  by mouth.     CALCIUM + D 600-200 MG-UNIT OR TABS 1 TABLET DAILY     No current facility-administered medications for  "this visit.         Allergies   Allergen Reactions     Dust Mites             Past Medical History:     Past Medical History:   Diagnosis Date     Chronic pain     sciatic pain left leg to toes     Numbness and tingling     left leg     PONV (postoperative nausea and vomiting)     hx nausea postop             Past Surgical History:      Past Surgical History:   Procedure Laterality Date     C NONSPECIFIC PROCEDURE      cervical spine surgery      C NONSPECIFIC PROCEDURE      varacose carroll removal right     HYSTERECTOMY, PAP NO LONGER INDICATED       SEPTOPLASTY, TURBINOPLASTY, COMBINED  2012    Procedure:COMBINED SEPTOPLASTY, TURBINOPLASTY; SEPTOPLASTY, TURBINATE Surgery ; Surgeon:OPAL FUENTES; Location:[facility] OR     TONSILLECTOMY & ADENOIDECTOMY  age 6       Social History   Substance Use Topics     Smoking status: Never Smoker     Smokeless tobacco: Never Used     Alcohol use No       Family History   Problem Relation Age of Onset     Cancer - colorectal Mother      born 1927 cancer 77yo and arthritis and thyroid     Alzheimer Disease Mother      Alzheimers/dementia     CEREBROVASCULAR DISEASE Mother 84     HEART DISEASE Father      born 1927 and HTN      CANCER Father 84     brain/lung cancer     DIABETES Sister      Family History Negative Sister      DIABETES Maternal Grandfather      dceased 77yo     Family History Negative Maternal Grandmother       83yo     CANCER Paternal Grandfather       88yo stomach     Arthritis Paternal Grandmother       91yo     Family History Negative Daughter      Family History Negative Daughter      Respiratory Daughter      asthma     Arthritis Son      rheumatoid arthritis     Arthritis Other             Physical Examination:   /67  Pulse 84  Resp 10  Ht 1.588 m (5' 2.52\")  Wt 73.5 kg (162 lb)  SpO2 97%  BMI 29.14 kg/m2            Data: All pertinent previous laboratory data reviewed     No results found for: PH, PHARTERIAL, PO2, " [id number], SAT, PCO2, HCO3, BASEEXCESS, SHIMA, BEB  Lab Results   Component Value Date    TSH 1.61 10/03/2016    TSH 1.38 09/21/2015     Lab Results   Component Value Date    GLC 92 10/03/2016    GLC 86 09/21/2015     Lab Results   Component Value Date    HGB 13.7 10/03/2016    HGB 14.0 09/21/2015     Lab Results   Component Value Date    BUN 15 10/03/2016    BUN 15 09/21/2015    CR 0.80 10/03/2016    CR 0.71 09/21/2015     Lab Results   Component Value Date    BEATRIZ 60 05/30/2014         She will follow up with me in about 12 month(s).         Copy to: Manuela Speulveda MD 9/14/2017     [facility]  [id number] [place] [id number]       Twenty-five minutes spent with patient, all of which were spent face-to-face counseling, consulting, coordinating plan of care and going over PAP download/sleep study and chart review.

## 2017-09-14 NOTE — PATIENT INSTRUCTIONS
MY TREATMENT INFORMATION FOR SLEEP APNEA-  Lolis ANETTE Phil    MY CONTACT NUMBERS ARE:  [phone]  DOCTOR : Chele BAIRD [facility]  SLEEP CENTER :  [place]  CPAP EQUIPMENT     Your sleep apnea is controlled with CPAP.   Continue use of CPAP:  - Recommend using CPAP every night for at least 7-8 hours each night  - Daytime naps are not advised, but use CPAP if taking naps.    You met all objective measure goal  Compliance  Goal >70%  Leak   Goal < 10%  AHI  Goal < 5 events per hour   Usage  Goal >420 minutes      Patient was advised not to drive if drowsy or sleepy.      Follow up in 12 months.      Weight Loss:    Weight management is a personal decision.  If you are interested in exploring weight loss strategies, the following discussion covers the impact on weight loss on sleep apnea and the approaches that may be successful.    Weight loss decreases severity of sleep apnea in most people with obesity. For those with mild obesity who have developed snoring with weight gain, even 15-30 pound weight loss can improve and occasionally eliminate sleep apnea.  Structured and life-long dietary and health habits are necessary to lose weight and keep healthier weight levels.     Though there may be significant health benefits from weight loss, long-term weight loss is very difficult to achieve- studies show success with dietary management in less than 10% of people. In addition, substantial weight loss may require years of dietary control and may be difficult if patients have severe obesity. In these cases, surgical management may be considered.  Finally, older individuals who have tolerated obesity without health complications may be less likely to benefit from weight loss strategies.    Your BMI is There is no height or weight on file to calculate BMI.  Body mass index (BMI) is one way to tell whether you are at a healthy weight, overweight, or obese. It measures your weight in relation to your height.  A BMI of 18.5  to 24.9 is in the healthy range. A person with a BMI of 25 to 29.9 is considered overweight, and someone with a BMI of 30 or greater is considered obese. More than two-thirds of American adults are considered overweight or obese.  Being overweight or obese increases the risk for further weight gain. Excess weight may lead to heart disease and diabetes.  Creating and following plans for healthy eating and physical activity may help you improve your health.  Weight control is part of healthy lifestyle and includes exercise, emotional health, and healthy eating habits. Careful eating habits lifelong are the mainstay of weight control. Though there are significant health benefits from weight loss, long-term weight loss with diet alone may be very difficult to achieve- studies show long-term success with dietary management in less than 10% of people. Attaining a healthy weight may be especially difficult to achieve in those with severe obesity. In some cases, medications, devices and surgical management might be considered.  What can you do?  If you are overweight or obese and are interested in methods for weight loss, you should discuss this with your provider.     Consider reducing daily calorie intake by 500 calories.     Keep a food journal.     Avoiding skipping meals, consider cutting portions instead.    Diet combined with exercise helps maintain muscle while optimizing fat loss. Strength training is particularly important for building and maintaining muscle mass. Exercise helps reduce stress, increase energy, and improves fitness. Increasing exercise without diet control, however, may not burn enough calories to loose weight.       Start walking three days a week 10-20 minutes at a time    Work towards walking thirty minutes five days a week     Eventually, increase the speed of your walking for 1-2 minutes at time    In addition, we recommend that you review healthy lifestyles and methods for weight loss  available through the National Institutes of Health patient information sites:  http://win.niddk.nih.gov/publications/index.htm    And look into health and wellness programs that may be available through your health insurance provider, employer, local community center, or zaki club.    Weight management plan: Patient was referred to their PCP to discuss a diet and exercise plan.    Your blood pressure was checked while you were in clinic today.  Please read the guidelines below about what these numbers mean and what you should do about them.  Your systolic blood pressure is the top number.  This is the pressure when the heart is pumping.  Your diastolic blood pressure is the bottom number.  This is the pressure in between beats.  If your systolic blood pressure is less than 120 and your diastolic blood pressure is less than 80, then your blood pressure is normal. There is nothing more that you need to do about it  If your systolic blood pressure is 120-139 or your diastolic blood pressure is 80-89, your blood pressure may be higher than it should be.  You should have your blood pressure re-checked within a year by a primary care provider.  If your systolic blood pressure is 140 or greater or your diastolic blood pressure is 90 or greater, you may have high blood pressure.  High blood pressure is treatable, but if left untreated over time it can put you at risk for heart attack, stroke, or kidney failure.  You should have your blood pressure re-checked by a primary care provider within the next four weeks.

## 2017-09-14 NOTE — MR AVS SNAPSHOT
After Visit Summary   9/14/2017    Lolis Villarreal    MRN: [id number]           Patient Information     Date Of Birth          1950        Visit Information        Provider Department      9/14/2017 9:30 AM Chele Umanzor MD [facility]        Care Instructions    MY TREATMENT INFORMATION FOR SLEEP APNEA-  Lolis Villarreal    MY CONTACT NUMBERS ARE:  [phone]  DOCTOR : Chele Umanzor  SLEEP CENTER :  [place]  CPAP EQUIPMENT     Your sleep apnea is controlled with CPAP.   Continue use of CPAP:  - Recommend using CPAP every night for at least 7-8 hours each night  - Daytime naps are not advised, but use CPAP if taking naps.    You met all objective measure goal  Compliance  Goal >70%  Leak   Goal < 10%  AHI  Goal < 5 events per hour   Usage  Goal >420 minutes      Patient was advised not to drive if drowsy or sleepy.      Follow up in 12 months.      Weight Loss:    Weight management is a personal decision.  If you are interested in exploring weight loss strategies, the following discussion covers the impact on weight loss on sleep apnea and the approaches that may be successful.    Weight loss decreases severity of sleep apnea in most people with obesity. For those with mild obesity who have developed snoring with weight gain, even 15-30 pound weight loss can improve and occasionally eliminate sleep apnea.  Structured and life-long dietary and health habits are necessary to lose weight and keep healthier weight levels.     Though there may be significant health benefits from weight loss, long-term weight loss is very difficult to achieve- studies show success with dietary management in less than 10% of people. In addition, substantial weight loss may require years of dietary control and may be difficult if patients have severe obesity. In these cases, surgical management may be considered.  Finally, older individuals who have tolerated obesity without  health complications may be less likely to benefit from weight loss strategies.    Your BMI is There is no height or weight on file to calculate BMI.  Body mass index (BMI) is one way to tell whether you are at a healthy weight, overweight, or obese. It measures your weight in relation to your height.  A BMI of 18.5 to 24.9 is in the healthy range. A person with a BMI of 25 to 29.9 is considered overweight, and someone with a BMI of 30 or greater is considered obese. More than two-thirds of American adults are considered overweight or obese.  Being overweight or obese increases the risk for further weight gain. Excess weight may lead to heart disease and diabetes.  Creating and following plans for healthy eating and physical activity may help you improve your health.  Weight control is part of healthy lifestyle and includes exercise, emotional health, and healthy eating habits. Careful eating habits lifelong are the mainstay of weight control. Though there are significant health benefits from weight loss, long-term weight loss with diet alone may be very difficult to achieve- studies show long-term success with dietary management in less than 10% of people. Attaining a healthy weight may be especially difficult to achieve in those with severe obesity. In some cases, medications, devices and surgical management might be considered.  What can you do?  If you are overweight or obese and are interested in methods for weight loss, you should discuss this with your provider.     Consider reducing daily calorie intake by 500 calories.     Keep a food journal.     Avoiding skipping meals, consider cutting portions instead.    Diet combined with exercise helps maintain muscle while optimizing fat loss. Strength training is particularly important for building and maintaining muscle mass. Exercise helps reduce stress, increase energy, and improves fitness. Increasing exercise without diet control, however, may not burn enough  calories to loose weight.       Start walking three days a week 10-20 minutes at a time    Work towards walking thirty minutes five days a week     Eventually, increase the speed of your walking for 1-2 minutes at time    In addition, we recommend that you review healthy lifestyles and methods for weight loss available through the National Institutes of Health patient information sites:  http://win.niddk.nih.gov/publications/index.htm    And look into health and wellness programs that may be available through your health insurance provider, employer, local community center, or zaki club.    Weight management plan: Patient was referred to their PCP to discuss a diet and exercise plan.    Your blood pressure was checked while you were in clinic today.  Please read the guidelines below about what these numbers mean and what you should do about them.  Your systolic blood pressure is the top number.  This is the pressure when the heart is pumping.  Your diastolic blood pressure is the bottom number.  This is the pressure in between beats.  If your systolic blood pressure is less than 120 and your diastolic blood pressure is less than 80, then your blood pressure is normal. There is nothing more that you need to do about it  If your systolic blood pressure is 120-139 or your diastolic blood pressure is 80-89, your blood pressure may be higher than it should be.  You should have your blood pressure re-checked within a year by a primary care provider.  If your systolic blood pressure is 140 or greater or your diastolic blood pressure is 90 or greater, you may have high blood pressure.  High blood pressure is treatable, but if left untreated over time it can put you at risk for heart attack, stroke, or kidney failure.  You should have your blood pressure re-checked by a primary care provider within the next four weeks.            Follow-ups after your visit        Your next 10 appointments already scheduled     Oct 04, 2017  " 9:20 AM CDT   PHYS[facility] with [facility]a [facility]a [facility]    [address]  [facility] [address]   [phone]            Oct 04, 2017 10:05 AM CDT   MA SCREENING DIGITAL BILATERAL with [id number]   [facility])    [address]  [facility] [address]   [phone]           Do not use any powder, lotion or deodorant under your arms or on your breast. If you do, we will ask you to remove it before your exam.  Wear comfortable, two-piece clothing.  If you have any allergies, tell your care team.  Bring any previous mammograms from other facilities or have them mailed to the breast center. Three-dimensional (3D) mammograms are available at [place] locations in [facility], and [place]. [facility] locations include [place] and [facility] in [place]. Benefits of 3D mammograms include: - Improved rate of cancer detection - Decreases your chance of having to go back for more tests, which means fewer: - \"False-positive\" results (This means that there is an abnormal area but it isn't cancer.) - Invasive testing procedures, such as a biopsy or surgery - Can provide clearer images of the breast if you have dense breast tissue. 3D mammography is an optional exam that anyone can have with a 2D mammogram. It doesn't replace or take the place of a 2D mammogram. 2D mammograms remain an effective screening test for all women.  Not all insurance companies cover the cost of a 3D mammogram. Check with your insurance.              Who to contact     If you have questions or need follow up information about today's clinic visit or your schedule please contact [facility] directly at [phone].  Normal or non-critical lab and imaging results will be communicated to you by MyChart, letter or phone within 4 business days after the " "clinic has received the results. If you do not hear from us within 7 days, please contact the clinic through [employer] or phone. If you have a critical or abnormal lab result, we will notify you by phone as soon as possible.  Submit refill requests through [employer] or call your pharmacy and they will forward the refill request to us. Please allow 3 business days for your refill to be completed.          Additional Information About Your Visit        [facility] Information     [employer] gives you secure access to your electronic health record. If you see a primary care provider, you can also send messages to your care team and make appointments. If you have questions, please call your primary care clinic.  If you do not have a primary care provider, please call [phone] and they will assist you.        Care EveryWhere ID     This is your Care EveryWhere ID. This could be used by other organizations to access your [place] medical records  [id number]        Your Vitals Were     Pulse Respirations Height Pulse Oximetry BMI (Body Mass Index)       84 10 1.588 m (5' 2.52\") 97% 29.14 kg/m2        Blood Pressure from Last 3 Encounters:   09/14/17 106/67   08/01/17 123/79   03/22/17 122/88    Weight from Last 3 Encounters:   09/14/17 73.5 kg (162 lb)   08/01/17 73.5 kg (162 lb)   03/22/17 76.8 kg (169 lb 6.4 oz)              Today, you had the following     No orders found for display       Primary Care Provider Office Phone # Fax #    Manuela Sepulveda [phone]       [address] [facility] [address]  [facility] [address]        Equal Access to Services     [facility]: Hadii aad ku hadasho Soomaali, waaxda luqadaha, qaybta kaalmada adeeggrey, bonita marcelo. So [facility] [phone].    ATENCIÓN: Si habla español, tiene a vuong disposición servicios gratuitos de asistencia lingüística. Llame al [phone].    We comply with applicable federal civil rights laws and [place] laws. We do " not discriminate on the basis of race, color, national origin, age, disability sex, sexual orientation or gender identity.            Thank you!     Thank you for choosing [facility]  for your care. Our goal is always to provide you with excellent care. Hearing back from our patients is one way we can continue to improve our services. Please take a few minutes to complete the written survey that you may receive in the mail after your visit with us. Thank you!             Your Updated Medication List - Protect others around you: Learn how to safely use, store and throw away your medicines at www.disposemymeds.org.          This list is accurate as of: 9/14/17 10:12 AM.  Always use your most recent med list.                   Brand Name Dispense Instructions for use Diagnosis    calcium + D 600-200 MG-UNIT Tabs   Generic drug:  calcium carbonate-vitamin D     30    1 TABLET DAILY    Preventative health care       cetirizine 10 MG tablet    zyrTEC    30 tablet    Take 1 tablet (10 mg) by mouth every evening        Krill Oil 1000 MG Caps           LOW-DOSE ASPIRIN PO      Take 81 mg by mouth daily        Misc Intestinal Lesa Regulat Tabs      Take  by mouth.    Routine general medical examination at a health care facility       Multi-vitamin Tabs tablet      Take 1 tablet by mouth daily        OMEGA-3 FISH OIL PO      Reported on 3/22/2017        Turmeric 500 MG Caps           vitamin B complex with vitamin C Tabs tablet      Take 1 tablet by mouth daily        VITAMIN B12 PO      Take  by mouth.

## 2017-10-04 ENCOUNTER — OFFICE VISIT (OUTPATIENT)
Dept: INTERNAL MEDICINE | Facility: CLINIC | Age: 67
End: 2017-10-04
Payer: COMMERCIAL

## 2017-10-04 ENCOUNTER — HOSPITAL ENCOUNTER (OUTPATIENT)
Dept: MAMMOGRAPHY | Facility: CLINIC | Age: 67
Discharge: HOME | End: 2017-10-04
Attending: INTERNAL MEDICINE | Admitting: INTERNAL MEDICINE
Payer: COMMERCIAL

## 2017-10-04 VITALS
HEIGHT: 62 IN | DIASTOLIC BLOOD PRESSURE: 74 MMHG | TEMPERATURE: 98 F | SYSTOLIC BLOOD PRESSURE: 104 MMHG | OXYGEN SATURATION: 96 % | BODY MASS INDEX: 30.51 KG/M2 | WEIGHT: 165.8 LBS | HEART RATE: 88 BPM

## 2017-10-04 DIAGNOSIS — Z23 NEED FOR PNEUMOCOCCAL VACCINATION: ICD-10-CM

## 2017-10-04 DIAGNOSIS — G47.33 OSA (OBSTRUCTIVE SLEEP APNEA): ICD-10-CM

## 2017-10-04 DIAGNOSIS — Z12.31 VISIT FOR SCREENING MAMMOGRAM: ICD-10-CM

## 2017-10-04 DIAGNOSIS — Z23 NEED FOR PROPHYLACTIC VACCINATION AND INOCULATION AGAINST INFLUENZA: ICD-10-CM

## 2017-10-04 DIAGNOSIS — Z00.00 ROUTINE GENERAL MEDICAL EXAMINATION AT A HEALTH CARE FACILITY: Primary | ICD-10-CM

## 2017-10-04 DIAGNOSIS — R68.2 DRY MOUTH: ICD-10-CM

## 2017-10-04 LAB
ERYTHROCYTE [DISTWIDTH] IN BLOOD BY AUTOMATED COUNT: 13.9 % (ref 10–15)
HCT VFR BLD AUTO: 40.8 % (ref 35–47)
HGB BLD-MCNC: 13.4 G/DL (ref 11.7–15.7)
MCH RBC QN AUTO: 29.5 PG (ref 26.5–33)
MCHC RBC AUTO-ENTMCNC: 32.8 G/DL (ref 31.5–36.5)
MCV RBC AUTO: 90 FL (ref 78–100)
PLATELET # BLD AUTO: 230 10E9/L (ref 150–450)
RBC # BLD AUTO: 4.55 10E12/L (ref 3.8–5.2)
WBC # BLD AUTO: 5.1 10E9/L (ref 4–11)

## 2017-10-04 PROCEDURE — 90662 IIV NO PRSV INCREASED AG IM: CPT | Performed by: INTERNAL MEDICINE

## 2017-10-04 PROCEDURE — 80061 LIPID PANEL: CPT | Performed by: INTERNAL MEDICINE

## 2017-10-04 PROCEDURE — 99397 PER PM REEVAL EST PAT 65+ YR: CPT | Mod: 25 | Performed by: INTERNAL MEDICINE

## 2017-10-04 PROCEDURE — 86235 NUCLEAR ANTIGEN ANTIBODY: CPT | Performed by: INTERNAL MEDICINE

## 2017-10-04 PROCEDURE — 36415 COLL VENOUS BLD VENIPUNCTURE: CPT | Performed by: INTERNAL MEDICINE

## 2017-10-04 PROCEDURE — 84443 ASSAY THYROID STIM HORMONE: CPT | Performed by: INTERNAL MEDICINE

## 2017-10-04 PROCEDURE — G0008 ADMIN INFLUENZA VIRUS VAC: HCPCS | Performed by: INTERNAL MEDICINE

## 2017-10-04 PROCEDURE — 86038 ANTINUCLEAR ANTIBODIES: CPT | Performed by: INTERNAL MEDICINE

## 2017-10-04 PROCEDURE — 86235 NUCLEAR ANTIGEN ANTIBODY: CPT | Mod: 59 | Performed by: INTERNAL MEDICINE

## 2017-10-04 PROCEDURE — G0202 SCR MAMMO BI INCL CAD: HCPCS

## 2017-10-04 PROCEDURE — 80053 COMPREHEN METABOLIC PANEL: CPT | Performed by: INTERNAL MEDICINE

## 2017-10-04 PROCEDURE — 90732 PPSV23 VACC 2 YRS+ SUBQ/IM: CPT | Performed by: INTERNAL MEDICINE

## 2017-10-04 PROCEDURE — 86431 RHEUMATOID FACTOR QUANT: CPT | Performed by: INTERNAL MEDICINE

## 2017-10-04 PROCEDURE — 85027 COMPLETE CBC AUTOMATED: CPT | Performed by: INTERNAL MEDICINE

## 2017-10-04 PROCEDURE — G0009 ADMIN PNEUMOCOCCAL VACCINE: HCPCS | Performed by: INTERNAL MEDICINE
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
## 2017-10-04 NOTE — NURSING NOTE
"Chief Complaint   Patient presents with     Medicare Visit       Initial /74 (BP Location: Left arm, Patient Position: Chair, Cuff Size: Adult Large)  Pulse 88  Temp 98  F (36.7  C) (Oral)  Ht 5' 2.25\" (1.581 m)  Wt 165 lb 12.8 oz (75.2 kg)  SpO2 96%  Breastfeeding? No  BMI 30.08 kg/m2 Estimated body mass index is 30.08 kg/(m^2) as calculated from the following:    Height as of this encounter: 5' 2.25\" (1.581 m).    Weight as of this encounter: 165 lb 12.8 oz (75.2 kg).  Medication Reconciliation: complete    "

## 2017-10-04 NOTE — MR AVS SNAPSHOT
After Visit Summary   10/4/2017    Lolis Villarreal    MRN: [id number]           Patient Information     Date Of Birth          1950        Visit Information        Provider Department      10/4/2017 9:20 AM Manuela Sepulveda MD [facility]        Today's Diagnoses     Routine general medical examination at a health care facility    -  1    Dry mouth        WENDY (obstructive sleep apnea)           Follow-ups after your visit        Your next 10 appointments already scheduled     Sep 19, 2018  9:30 AM CDT   Return Sleep Patient with Chele Umanzor MD   [facility] [place] [facility]    [address]  [facility] [id number] [phone]              Who to contact     If you have questions or need follow up information about today's clinic visit or your schedule please contact [facility] directly at [phone].  Normal or non-critical lab and imaging results will be communicated to you by MyChart, letter or phone within 4 business days after the clinic has received the results. If you do not hear from us within 7 days, please contact the clinic through Victriohart or phone. If you have a critical or abnormal lab result, we will notify you by phone as soon as possible.  Submit refill requests through Bitbar or call your pharmacy and they will forward the refill request to us. Please allow 3 business days for your refill to be completed.          Additional Information About Your Visit        MyChart Information     Bitbar gives you secure access to your electronic health record. If you see a primary care provider, you can also send messages to your care team and make appointments. If you have questions, please call your primary care clinic.  If you do not have a primary care provider, please call [phone] and they will assist you.        Care EveryWhere ID     This is your Care EveryWhere ID. This  "could be used by other organizations to access your [facility] medical records  [id number]        Your Vitals Were     Pulse Temperature Height Pulse Oximetry Breastfeeding? BMI (Body Mass Index)    88 98  F (36.7  C) (Oral) 1.581 m (5' 2.25\") 96% No 30.08 kg/m2       Blood Pressure from Last 3 Encounters:   10/04/17 104/74   09/14/17 106/67   08/01/17 123/79    Weight from Last 3 Encounters:   10/04/17 75.2 kg (165 lb 12.8 oz)   09/14/17 73.5 kg (162 lb)   08/01/17 73.5 kg (162 lb)              We Performed the Following     Anti Nuclear Bebe IgG by IFA with Reflex     CBC with platelets     Comprehensive metabolic panel     Lipid panel reflex to direct LDL     Rheumatoid factor     SSA Ro VANESSA Antibody IgG     SSB La VANESSA Antibody IgG     TSH with free T4 reflex          Today's Medication Changes          These changes are accurate as of: 10/4/17 10:09 AM.  If you have any questions, ask your nurse or doctor.               Stop taking these medicines if you haven't already. Please contact your care team if you have questions.     OMEGA-3 FISH OIL PO   Stopped by:  Manuela Sepulveda MD                    Primary Care Provider Office Phone # Fax #    Manuela Sepulveda [phone]       [address]  [place] [address]        Equal Access to Services     [facility] : Hadii tiana ku hadasho Soomaali, waaxda luqadaha, qaybta kaalmada conner, bonita marcelo. So [place] -[phone].    ATENCIÓN: Si habla español, tiene a vuong disposición servicios gratuitos de asistencia lingüística. Laura al [phone].    We comply with applicable federal civil rights laws and [place] laws. We do not discriminate on the basis of race, color, national origin, age, disability, sex, sexual orientation, or gender identity.            Thank you!     Thank you for choosing [facility]  for your care. Our goal is always to provide you with excellent care. Hearing " back from our patients is one way we can continue to improve our services. Please take a few minutes to complete the written survey that you may receive in the mail after your visit with us. Thank you!             Your Updated Medication List - Protect others around you: Learn how to safely use, store and throw away your medicines at www.disposemymeds.org.          This list is accurate as of: 10/4/17 10:09 AM.  Always use your most recent med list.                   Brand Name Dispense Instructions for use Diagnosis    calcium + D 600-200 MG-UNIT Tabs   Generic drug:  calcium carbonate-vitamin D     30    1 TABLET DAILY    Preventative health care       cetirizine 10 MG tablet    zyrTEC    30 tablet    Take 1 tablet (10 mg) by mouth every evening        Krill Oil 1000 MG Caps           LOW-DOSE ASPIRIN PO      Take 81 mg by mouth daily        Misc Intestinal Lesa Regulat Tabs      Take  by mouth.    Routine general medical examination at a health care facility       Multi-vitamin Tabs tablet      Take 1 tablet by mouth daily        order for DME      Uses C-pap.        Turmeric 500 MG Caps           vitamin B complex with vitamin C Tabs tablet      Take 1 tablet by mouth daily        VITAMIN B12 PO      Take  by mouth.

## 2017-10-05 LAB
ALBUMIN SERPL-MCNC: 4 G/DL (ref 3.4–5)
ALP SERPL-CCNC: 74 U/L (ref 40–150)
ALT SERPL W P-5'-P-CCNC: 26 U/L (ref 0–50)
ANA SER QL IF: NEGATIVE
ANION GAP SERPL CALCULATED.3IONS-SCNC: 11 MMOL/L (ref 3–14)
AST SERPL W P-5'-P-CCNC: 16 U/L (ref 0–45)
BILIRUB SERPL-MCNC: 0.4 MG/DL (ref 0.2–1.3)
BUN SERPL-MCNC: 17 MG/DL (ref 7–30)
CALCIUM SERPL-MCNC: 9.3 MG/DL (ref 8.5–10.1)
CHLORIDE SERPL-SCNC: 105 MMOL/L (ref 94–109)
CHOLEST SERPL-MCNC: 230 MG/DL
CO2 SERPL-SCNC: 23 MMOL/L (ref 20–32)
CREAT SERPL-MCNC: 0.69 MG/DL (ref 0.52–1.04)
ENA SS-A IGG SER IA-ACNC: <0.2 AI (ref 0–0.9)
ENA SS-B IGG SER IA-ACNC: <0.2 AI (ref 0–0.9)
GFR SERPL CREATININE-BSD FRML MDRD: 86 ML/MIN/1.7M2
GLUCOSE SERPL-MCNC: 87 MG/DL (ref 70–99)
HDLC SERPL-MCNC: 67 MG/DL
LDLC SERPL CALC-MCNC: 138 MG/DL
NONHDLC SERPL-MCNC: 163 MG/DL
POTASSIUM SERPL-SCNC: 4.3 MMOL/L (ref 3.4–5.3)
PROT SERPL-MCNC: 7.6 G/DL (ref 6.8–8.8)
RHEUMATOID FACT SER NEPH-ACNC: <20 IU/ML (ref 0–20)
SODIUM SERPL-SCNC: 139 MMOL/L (ref 133–144)
TRIGL SERPL-MCNC: 125 MG/DL
TSH SERPL DL<=0.005 MIU/L-ACNC: 1.89 MU/L (ref 0.4–4)

## 2018-01-29 ENCOUNTER — TRANSFERRED RECORDS (OUTPATIENT)
Dept: HEALTH INFORMATION MANAGEMENT | Facility: CLINIC | Age: 68
End: 2018-01-29

## 2018-02-05 ENCOUNTER — TRANSFERRED RECORDS (OUTPATIENT)
Dept: HEALTH INFORMATION MANAGEMENT | Facility: CLINIC | Age: 68
End: 2018-02-05

## 2018-02-16 ENCOUNTER — DOCUMENTATION ONLY (OUTPATIENT)
Dept: SLEEP MEDICINE | Facility: CLINIC | Age: 68
End: 2018-02-16
Payer: COMMERCIAL

## 2018-02-16 NOTE — PROGRESS NOTES
Patient came to [facility] mask fitting appointment on February 16, 2018. Patient requested to switch masks because it was time to get a new mask.  Patient tried on the followings masks, the Airfit N20 small.  This mask worked out well for the patient. Patient selected  Resmed Airfit N20 Nasal mask For Her.

## 2018-02-26 ENCOUNTER — TRANSFERRED RECORDS (OUTPATIENT)
Dept: HEALTH INFORMATION MANAGEMENT | Facility: CLINIC | Age: 68
End: 2018-02-26

## 2018-03-02 ENCOUNTER — OFFICE VISIT (OUTPATIENT)
Dept: OPTOMETRY | Facility: CLINIC | Age: 68
End: 2018-03-02
Payer: COMMERCIAL

## 2018-03-02 DIAGNOSIS — H52.202 MYOPIA OF LEFT EYE WITH ASTIGMATISM: Primary | ICD-10-CM

## 2018-03-02 DIAGNOSIS — H52.4 PRESBYOPIA: ICD-10-CM

## 2018-03-02 DIAGNOSIS — H04.129 DRY EYE: ICD-10-CM

## 2018-03-02 DIAGNOSIS — H02.889 MEIBOMIAN GLAND DYSFUNCTION: ICD-10-CM

## 2018-03-02 DIAGNOSIS — H52.12 MYOPIA OF LEFT EYE WITH ASTIGMATISM: Primary | ICD-10-CM

## 2018-03-02 PROCEDURE — 92004 COMPRE OPH EXAM NEW PT 1/>: CPT | Performed by: OPTOMETRIST

## 2018-03-02 PROCEDURE — 92015 DETERMINE REFRACTIVE STATE: CPT | Performed by: OPTOMETRIST

## 2018-03-02 RX ORDER — AMOXICILLIN 875 MG
TABLET ORAL
Refills: 0 | COMMUNITY
Start: 2018-01-29 | End: 2018-04-11

## 2018-03-02 ASSESSMENT — REFRACTION_MANIFEST
OS_SPHERE: -2.50
OD_AXIS: 015
OS_CYLINDER: +1.00
OS_AXIS: 170
OS_AXIS: 179
OS_CYLINDER: +1.00
OD_CYLINDER: +0.50
OS_SPHERE: -2.50
METHOD_AUTOREFRACTION: 1
OD_SPHERE: PLANO
OD_SPHERE: +0.25
OD_AXIS: 017
OD_CYLINDER: +0.25

## 2018-03-02 ASSESSMENT — VISUAL ACUITY
OS_SC: 20/40
OD_SC: 20/20
OS_SC: 20/30
OD_SC: 20/80
METHOD: SNELLEN - LINEAR

## 2018-03-02 ASSESSMENT — CONF VISUAL FIELD
OD_NORMAL: 1
OS_NORMAL: 1

## 2018-03-02 ASSESSMENT — TONOMETRY
IOP_METHOD: APPLANATION
OS_IOP_MMHG: 12
OD_IOP_MMHG: 12

## 2018-03-02 ASSESSMENT — CUP TO DISC RATIO
OS_RATIO: 0.5
OD_RATIO: 0.5

## 2018-03-02 ASSESSMENT — EXTERNAL EXAM - LEFT EYE: OS_EXAM: NORMAL

## 2018-03-02 ASSESSMENT — EXTERNAL EXAM - RIGHT EYE: OD_EXAM: NORMAL

## 2018-03-02 NOTE — PATIENT INSTRUCTIONS
Optional distance and reading prescription so both eyes corrected for distance and near     DRY EYE TREATMENT    I recommend using artificial tears for your dry eye. There are over the counter drops that work well and may be used up to 4x daily. ( systane balance, refresh optive, soothe xp)   If you need more than 4 drops daily, use a preservative free product which come in individual vials which may be used for 24 hours and discarded.     Artificial tears work best as a preventative and not as well after your eyes are starting to bother you.  It may take 4- 6 weeks of using the drops before you notice improvement.  If after that time you are still having problems schedule an appointment for an evaluation and discussion of different treatments.  Dry eyes are a chronic condition and you may have more symptoms at certain times of the year.      Additional recommended treatment:  Warm compresses once to twice daily for 5-10 minutes    Directions for warm soaks  There are few methods for hot compresses. Moisten a washcloth with hot water, or microwave for 10 seconds, being careful to not get the cloth too hot.   Then put the washcloth onto your eyelids for 5 minutes. It will cool quickly so a rice pack or eyemask that can be heated and laid on top of the washcloth will help retain the heat.          Omega 3 fatty acid supplements taken 1-2x daily  ( Krill)    Meibomian gland dysfunction or Posterior Blepharitis, is characterized by inflammation along both the uppper and lower eyelid margins. A single row of these glands is present in each lid with openings along the lid margins.  It is often found in association with skin conditions such as rosacea and seborrheic dermatitis.    Symptoms include:  ?Red eyes  ?Gritty or burning sensation  ?Burning sensation  ?Excessive tearing  ?Itchy eyelids  ?Red, swollen eyelids  ?Crusting or matting of eyelashes in the morning  ?Light sensitivity  ?Blurred vision  9Similar treatment  )  It is important to keep cosmetics from blocking these oil glands. If blocked, they do not   excrete oil into the tear film, which causes the tears to evaporate quickly.   This may result in watery eyes.  There is also an increase of bacterial growth when the tear film is unstable, leading to further ocular surface inflammation.    Warm compresses are very beneficial to the normal functioning of the eye.  Warm compresses for 5-10 minutes twice daily and keeping the lid margins clean  and help maintain a good tear film.   Moisten a washcloth with hot water, or microwave for 10 seconds, being careful to not get the cloth too hot.   Then put the washcloth onto your eyelids for 5 minutes. It will cool quickly so a rice pack or eyemask that can be heated and laid on top of the washcloth will help retain the heat.    Omega 3 fatty acid supplements taken once to twice daily and artificial tears such as Soothe xp, Refresh optive , and systane balance are also an additional treatment to control inflammation and help soothe your eyes.

## 2018-03-02 NOTE — PROGRESS NOTES
Chief Complaint   Patient presents with     COMPREHENSIVE EYE EXAM     Routine    eyes water all the time , soap , make up using tea bags and vit E     Had eyelid tatoo yesterday  Last Eye Exam: 2yrs  Dilated Previously: Yes    What are you currently using to see?  does not use glasses or contacts  monolasik done 16 yo     Distance Vision Acuity: Satisfied with vision    Near Vision Acuity: Satisfied with vision while reading  unaided    Eye Comfort: teary  Do you use eye drops? : No  Occupation or Hobbies: Computer/Reading         HPI    Symptoms:     Blurred vision                      Medical, surgical and family histories reviewed and updated 3/2/2018.       OBJECTIVE: See Ophthalmology exam    ASSESSMENT:    ICD-10-CM    1. Myopia of left eye with astigmatism H52.12 EYE EXAM (SIMPLE-NONBILLABLE)    H52.202 REFRACTION   2. Presbyopia H52.4    3. Dry eye - Both Eyes H04.129 EYE EXAM (SIMPLE-NONBILLABLE)   4. 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

## 2018-03-02 NOTE — MR AVS SNAPSHOT
After Visit Summary   3/2/2018    Lolis Villarreal    MRN: [id number]           Patient Information     Date Of Birth          1950        Visit Information        Provider Department      3/2/2018 9:00 AM Lupe Hoff, ESTELLA [facility]        Today's Diagnoses     Myopia of left eye with astigmatism    -  1    Presbyopia        Dry eye - Both Eyes        Meibomian gland dysfunction          Care Instructions    Optional distance and reading prescription so both eyes corrected for distance and near     DRY EYE TREATMENT    I recommend using artificial tears for your dry eye. There are over the counter drops that work well and may be used up to 4x daily. ( systane balance, refresh optive, soothe xp)   If you need more than 4 drops daily, use a preservative free product which come in individual vials which may be used for 24 hours and discarded.     Artificial tears work best as a preventative and not as well after your eyes are starting to bother you.  It may take 4- 6 weeks of using the drops before you notice improvement.  If after that time you are still having problems schedule an appointment for an evaluation and discussion of different treatments.  Dry eyes are a chronic condition and you may have more symptoms at certain times of the year.      Additional recommended treatment:  Warm compresses once to twice daily for 5-10 minutes    Directions for warm soaks  There are few methods for hot compresses. Moisten a washcloth with hot water, or microwave for 10 seconds, being careful to not get the cloth too hot.   Then put the washcloth onto your eyelids for 5 minutes. It will cool quickly so a rice pack or eyemask that can be heated and laid on top of the washcloth will help retain the heat.          Omega 3 fatty acid supplements taken 1-2x daily  ( Krill)    Meibomian gland dysfunction or Posterior Blepharitis, is characterized by inflammation along both the uppper and  lower eyelid margins. A single row of these glands is present in each lid with openings along the lid margins.  It is often found in association with skin conditions such as rosacea and seborrheic dermatitis.    Symptoms include:  ?Red eyes  ?Gritty or burning sensation  ?Burning sensation  ?Excessive tearing  ?Itchy eyelids  ?Red, swollen eyelids  ?Crusting or matting of eyelashes in the morning  ?Light sensitivity  ?Blurred vision  9Similar treatment )  It is important to keep cosmetics from blocking these oil glands. If blocked, they do not   excrete oil into the tear film, which causes the tears to evaporate quickly.   This may result in watery eyes.  There is also an increase of bacterial growth when the tear film is unstable, leading to further ocular surface inflammation.    Warm compresses are very beneficial to the normal functioning of the eye.  Warm compresses for 5-10 minutes twice daily and keeping the lid margins clean  and help maintain a good tear film.   Moisten a washcloth with hot water, or microwave for 10 seconds, being careful to not get the cloth too hot.   Then put the washcloth onto your eyelids for 5 minutes. It will cool quickly so a rice pack or eyemask that can be heated and laid on top of the washcloth will help retain the heat.    Omega 3 fatty acid supplements taken once to twice daily and artificial tears such as Soothe xp, Refresh optive , and systane balance are also an additional treatment to control inflammation and help soothe your eyes.               Follow-ups after your visit        Follow-up notes from your care team     Return in about 1 year (around 3/2/2019) for Annual Visit.      Your next 10 appointments already scheduled     Sep 19, 2018  9:30 AM CDT   Return Sleep Patient with Chele Umanzor MD   [facility] ([facility])    [address] [address] [address] [phone]              Who to  contact     If you have questions or need follow up information about today's clinic visit or your schedule please contact [facility] [place] directly at [phone].  Normal or non-critical lab and imaging results will be communicated to you by MyChart, letter or phone within 4 business days after the clinic has received the results. If you do not hear from us within 7 days, please contact the clinic through MyChart or phone. If you have a critical or abnormal lab result, we will notify you by phone as soon as possible.  Submit refill requests through [employer] or call your pharmacy and they will forward the refill request to us. Please allow 3 business days for your refill to be completed.          Additional Information About Your Visit        [employer] Information     [employer] gives you secure access to your electronic health record. If you see a primary care provider, you can also send messages to your care team and make appointments. If you have questions, please call your primary care clinic.  If you do not have a primary care provider, please call [phone] and they will assist you.        Care EveryWhere ID     This is your Care EveryWhere ID. This could be used by other organizations to access your [place] medical records  [id number]         Blood Pressure from Last 3 Encounters:   10/04/17 104/74   09/14/17 106/67   08/01/17 123/79    Weight from Last 3 Encounters:   10/04/17 75.2 kg (165 lb 12.8 oz)   09/14/17 73.5 kg (162 lb)   08/01/17 73.5 kg (162 lb)              We Performed the Following     EYE EXAM (SIMPLE-NONBILLABLE)     REFRACTION        Primary Care Provider Office Phone # Fax #    Manuela Sepulveda [phone]       [address]  [facility] [address]        Equal Access to Services     HA MYERS : Roberto Carlos Segundo, watiarada rina, qaybta kaaljulio prieto, bonita marcelo. So [facility] [phone].    ATENCIÓN: Si  tonny guardado, tiene a vuong disposición servicios gratuitos de asistencia lingüística. Laura michelle [phone].    We comply with applicable federal civil rights laws and [place] laws. We do not discriminate on the basis of race, color, national origin, age, disability, sex, sexual orientation, or gender identity.            Thank you!     Thank you for choosing [facility] [place]  for your care. Our goal is always to provide you with excellent care. Hearing back from our patients is one way we can continue to improve our services. Please take a few minutes to complete the written survey that you may receive in the mail after your visit with us. Thank you!             Your Updated Medication List - Protect others around you: Learn how to safely use, store and throw away your medicines at www.disposemymeds.org.          This list is accurate as of 3/2/18  9:52 AM.  Always use your most recent med list.                   Brand Name Dispense Instructions for use Diagnosis    amoxicillin 875 MG tablet    AMOXIL     TAKE 1 TABLET (875 MG TOTAL) BY MOUTH 2 (TWO) TIMES A DAY FOR 10 DAYS.        calcium + D 600-200 MG-UNIT Tabs   Generic drug:  calcium carbonate-vitamin D     30    1 TABLET DAILY    Preventative health care       cetirizine 10 MG tablet    zyrTEC    30 tablet    Take 1 tablet (10 mg) by mouth every evening        Krill Oil 1000 MG Caps           LOW-DOSE ASPIRIN PO      Take 81 mg by mouth daily        Misc Intestinal Lesa Regulat Tabs      Take  by mouth.    Routine general medical examination at a health care facility       Multi-vitamin Tabs tablet      Take 1 tablet by mouth daily        order for DME      Uses C-pap.        Turmeric 500 MG Caps           vitamin B complex with vitamin C Tabs tablet      Take 1 tablet by mouth daily        VITAMIN B12 PO      Take  by mouth.

## 2018-03-02 NOTE — LETTER
3/2/2018         RE: Lolis Villarreal  [address]  [place] [address]        Dear Colleague,    Thank you for referring your patient, Lolis Villarreal, to the [facility] [place]. Please see a copy of my visit note below.    Chief Complaint   Patient presents with     COMPREHENSIVE EYE EXAM     Routine    eyes water all the time , soap , make up using tea bags and vit E     Had eyelid tatoo yesterday  Last Eye Exam: 2yrs  Dilated Previously: Yes    What are you currently using to see?  does not use glasses or contacts  monolasik done 16 yo     Distance Vision Acuity: Satisfied with vision    Near Vision Acuity: Satisfied with vision while reading  unaided    Eye Comfort: teary  Do you use eye drops? : No  Occupation or Hobbies: Computer/Reading         HPI    Symptoms:     Blurred vision                      Medical, surgical and family histories reviewed and updated 3/2/2018.       OBJECTIVE: See Ophthalmology exam    ASSESSMENT:    ICD-10-CM    1. Myopia of left eye with astigmatism H52.12 EYE EXAM (SIMPLE-NONBILLABLE)    H52.202 REFRACTION   2. Presbyopia H52.4    3. Dry eye - Both Eyes H04.129 EYE EXAM (SIMPLE-NONBILLABLE)   4. Meibomian gland dysfunction H02.89 EYE EXAM (SIMPLE-NONBILLABLE)    very little subjective improvement  With correction  PLAN:   Warm compresses , artificial tears  For lipid deficient dry eye  Continue with krill  Optional prescription     Lupe Hoff OD     Again, thank you for allowing me to participate in the care of your patient.        Sincerely,        Lupe Hoff, OD

## 2018-03-22 ENCOUNTER — TRANSFERRED RECORDS (OUTPATIENT)
Dept: HEALTH INFORMATION MANAGEMENT | Facility: CLINIC | Age: 68
End: 2018-03-22

## 2018-04-11 ENCOUNTER — OFFICE VISIT (OUTPATIENT)
Dept: INTERNAL MEDICINE | Facility: CLINIC | Age: 68
End: 2018-04-11
Payer: COMMERCIAL

## 2018-04-11 VITALS
TEMPERATURE: 98.5 F | SYSTOLIC BLOOD PRESSURE: 128 MMHG | WEIGHT: 175.4 LBS | RESPIRATION RATE: 21 BRPM | BODY MASS INDEX: 31.82 KG/M2 | HEART RATE: 99 BPM | DIASTOLIC BLOOD PRESSURE: 72 MMHG | OXYGEN SATURATION: 99 %

## 2018-04-11 DIAGNOSIS — Z01.818 PREOP GENERAL PHYSICAL EXAM: Primary | ICD-10-CM

## 2018-04-11 DIAGNOSIS — J32.9 CHRONIC RECURRENT SINUSITIS: ICD-10-CM

## 2018-04-11 LAB — HGB BLD-MCNC: 13.1 G/DL (ref 11.7–15.7)

## 2018-04-11 PROCEDURE — 80048 BASIC METABOLIC PNL TOTAL CA: CPT | Performed by: INTERNAL MEDICINE

## 2018-04-11 PROCEDURE — 85018 HEMOGLOBIN: CPT | Performed by: INTERNAL MEDICINE

## 2018-04-11 PROCEDURE — 36415 COLL VENOUS BLD VENIPUNCTURE: CPT | Performed by: INTERNAL MEDICINE

## 2018-04-11 PROCEDURE — 99214 OFFICE O/P EST MOD 30 MIN: CPT | Performed by: INTERNAL MEDICINE

## 2018-04-11 PROCEDURE — 93000 ELECTROCARDIOGRAM COMPLETE: CPT | Performed by: INTERNAL MEDICINE

## 2018-04-11 NOTE — PROGRESS NOTES
[place] [address]  [facility] [address]  [phone]  Dept: [phone]    PRE-OP EVALUATION:  Today's date: 2018    Lolis Villarreal (: 1950) presents for pre-operative evaluation assessment as requested by Dr. Allan.  She requires evaluation and anesthesia risk assessment prior to undergoing surgery/procedure for treatment of recurrent sinus infections -  Nasal/sinus Endoscopy with Maxillary Antrostomy .    Fax number for surgical facility: [phone]  Primary Physician: Manuela Sepulveda  Type of Anesthesia Anticipated: General    Patient has a Health Care Directive or Living Will:  YES     Preop Questions 2018   Who is doing your surgery? Artemio Allan MD   What are you having done?  Nasal/sinus Endoscopy with Maxillary Antrostomy . & Lip Biopsy   Date of Surgery/Procedure: 2018   Facility or Hospital where procedure/surgery will be performed: [facility]   1.  Do you have a history of Heart attack, stroke, stent, coronary bypass surgery, or other heart surgery? No   2.  Do you ever have any pain or discomfort in your chest? No   3.  Do you have a history of  Heart Failure? No   4.   Are you troubled by shortness of breath when:  walking on a level surface, or up a slight hill, or at night? No   5.  Do you currently have a cold, bronchitis or other respiratory infection? No   6.  Do you have a cough, shortness of breath, or wheezing? No   7.  Do you sometimes get pains in the calves of your legs when you walk? YES - some times   8. Do you or anyone in your family have previous history of blood clots? UNKNOWN -     9.  Do you or does anyone in your family have a serious bleeding problem such as prolonged bleeding following surgeries or cuts? No   10. Have you ever had problems with anemia or been told to take iron pills? No   11. Have you had any abnormal blood loss such as black, tarry or bloody stools, or abnormal vaginal  bleeding? No   12. Have you ever had a blood transfusion? No   13. Have you or any of your relatives ever had problems with anesthesia? No   14. Do you have sleep apnea, excessive snoring or daytime drowsiness? YES -  On CPAP    15. Do you have any prosthetic heart valves? No   16. Do you have prosthetic joints? No   17. Is there any chance that you may be pregnant? No         HPI:        See problem list for active medical problems.  Problems all longstanding and stable, except as noted/documented.  See ROS for pertinent symptoms related to these conditions.                                                                                                                                                          .    MEDICAL HISTORY:     Patient Active Problem List    Diagnosis Date Noted     WENDY (obstructive sleep apnea) 07/31/2017     Priority: Medium     Dry mouth 10/03/2016     Priority: Medium     Advanced directives, counseling/discussion 08/17/2011     Priority: Medium     Patient states has Advance Directive and will bring in a copy to clinic.         CARDIOVASCULAR SCREENING; LDL GOAL LESS THAN 160 10/31/2010     Priority: Medium        Past Medical History:   Diagnosis Date     Chronic pain     sciatic pain left leg to toes     Numbness and tingling     left leg     PONV (postoperative nausea and vomiting)     hx nausea postop       Past Surgical History:   Procedure Laterality Date     C NONSPECIFIC PROCEDURE  2003    cervical spine surgery      C NONSPECIFIC PROCEDURE      varacose carroll removal right     ENHANCE LASER REFRACTIVE BILATERAL EXISTING PT IN PARAMETERS       HYSTERECTOMY, PAP NO LONGER INDICATED       SEPTOPLASTY, TURBINOPLASTY, COMBINED  1/20/2012    Procedure:COMBINED SEPTOPLASTY, TURBINOPLASTY; SEPTOPLASTY, TURBINATE Surgery ; Surgeon:OPAL FUENTES; Location:[facility] OR     TONSILLECTOMY & ADENOIDECTOMY  age 6       Current Outpatient Prescriptions   Medication Sig Dispense Refill     order for DME  Uses C-pap.       LOW-DOSE ASPIRIN PO Take 81 mg by mouth daily       vitamin B complex with vitamin C (VITAMIN  B COMPLEX) TABS tablet Take 1 tablet by mouth daily       cetirizine (ZYRTEC) 10 MG tablet Take 1 tablet (10 mg) by mouth every evening 30 tablet 1     Krill Oil 1000 MG CAPS        multivitamin, therapeutic with minerals (MULTI-VITAMIN) TABS Take 1 tablet by mouth daily       Turmeric 500 MG CAPS        Probiotic Product (MISC INTESTINAL COLE REGULAT) TABS Take  by mouth.       Cyanocobalamin (VITAMIN B12 PO) Take  by mouth.       CALCIUM + D 600-200 MG-UNIT OR TABS 1 TABLET DAILY 30 0     OTC products: None, except as noted above    Allergies   Allergen Reactions     Dust Mites       Latex Allergy: NO    Social History   Substance Use Topics     Smoking status: Never Smoker     Smokeless tobacco: Never Used     Alcohol use No     History   Drug Use No       REVIEW OF SYSTEMS:   CONSTITUTIONAL: NEGATIVE for fever, chills, change in weight  INTEGUMENTARY/SKIN: NEGATIVE for worrisome rashes, moles or lesions  EYES: NEGATIVE for vision changes or irritation  ENT/MOUTH: NEGATIVE for ear, mouth and throat problems  RESP: NEGATIVE for significant cough or SOB  BREAST: NEGATIVE for masses, tenderness or discharge  CV: NEGATIVE for chest pain, palpitations or peripheral edema  GI: NEGATIVE for nausea, abdominal pain, heartburn, or change in bowel habits  : NEGATIVE for frequency, dysuria, or hematuria  MUSCULOSKELETAL: NEGATIVE for significant arthralgias or myalgia  NEURO: NEGATIVE for weakness, dizziness or paresthesias  ENDOCRINE: NEGATIVE for temperature intolerance, skin/hair changes  HEME: NEGATIVE for bleeding problems  PSYCHIATRIC: NEGATIVE for changes in mood or affect    EXAM:   /72  Pulse 99  Temp 98.5  F (36.9  C) (Oral)  Resp 21  Wt 175 lb 6.4 oz (79.6 kg)  SpO2 99%  BMI 31.82 kg/m2    GENERAL APPEARANCE: healthy, alert and no distress     EYES: EOMI, PERRL     HENT: ear canals and  TM's normal and nose and mouth without ulcers or lesions     NECK: no adenopathy, no asymmetry, masses, or scars and thyroid normal to palpation     RESP: lungs clear to auscultation - no rales, rhonchi or wheezes     CV: regular rates and rhythm, normal S1 S2, no S3 or S4 and no murmur, click or rub     ABDOMEN:  soft, nontender, no HSM or masses and bowel sounds normal     MS: extremities normal- no gross deformities noted, no evidence of inflammation in joints, FROM in all extremities.      NEURO: Normal strength and tone, sensory exam grossly normal, mentation intact and speech normal     PSYCH: mentation appears normal. and affect normal/bright     LYMPHATICS: No cervical adenopathy    DIAGNOSTICS:   EKG: appears normal, NSR, normal axis,   no acute ST/T changes c/w ischemia, no LVH by voltage criteria,   Hemoglobin  pending   Serum Potassium pending     Recent Labs   Lab Test  10/04/17   1101  10/03/16   0831   HGB  13.4  13.7   PLT  230  224   NA  139  139   POTASSIUM  4.3  4.1   CR  0.69  0.80        IMPRESSION:     (Z01.818) Preop general physical exam  (primary encounter diagnosis)  Comment: Nasal/sinus Endoscopy with Maxillary Antrostomy . & Lip Biopsy  Plan: Hemoglobin, Basic metabolic panel           (J32.9) Chronic recurrent sinusitis  Plan: Nasal/sinus Endoscopy with Maxillary Antrostomy . & Lip Biopsy      The proposed surgical procedure is considered LOW risk.    REVISED CARDIAC RISK INDEX  The patient has the following serious cardiovascular risks for perioperative complications such as (MI, PE, VFib and 3  AV Block):  No serious cardiac risks       The patient has the following additional risks for perioperative complications:  Has sleep apnea and wears CPAP      ICD-10-CM    1. Preop general physical exam Z01.818 Hemoglobin     Basic metabolic panel   2. Chronic recurrent sinusitis J32.9        RECOMMENDATIONS:         Anticoagulant or Antiplatelet Medication Use  ASPIRIN: Discontinue ASA 7 days  prior to procedure to reduce bleeding risk.    NSAIDS:    Stop  3 days prior to surgery        APPROVAL GIVEN to proceed with proposed procedure, without further diagnostic evaluation       Signed Electronically by: Samira Hamlin MD    Copy of this evaluation report is provided to requesting physician.    [facility] Preop Guidelines    Revised Cardiac Risk Index

## 2018-04-11 NOTE — MR AVS SNAPSHOT
After Visit Summary   4/11/2018    Lolis Villarreal    MRN: [id number]           Patient Information     Date Of Birth          1950        Visit Information        Provider Department      4/11/2018 9:40 AM Samira Hamlin MD [facility]        Today's Diagnoses     Preop general physical exam    -  1    Chronic recurrent sinusitis          Care Instructions      Before Your Surgery      Call your surgeon if there is any change in your health. This includes signs of a cold or flu (such as a sore throat, runny nose, cough, rash or fever).    Do not smoke, drink alcohol or take over the counter medicine (unless your surgeon or primary care doctor tells you to) for the 24 hours before and after surgery.    If you take prescribed drugs: Follow your doctor s orders about which medicines to take and which to stop until after surgery.    Eating and drinking prior to surgery: follow the instructions from your surgeon    Take a shower or bath the night before surgery. Use the soap your surgeon gave you to gently clean your skin. If you do not have soap from your surgeon, use your regular soap. Do not shave or scrub the surgery site.  Wear clean pajamas and have clean sheets on your bed.           Follow-ups after your visit        Your next 10 appointments already scheduled     Sep 19, 2018  9:30 AM CDT   Return Sleep Patient with Chele Umanzor MD   [facility] ([facility])    [address] [address] [id number] [phone]              Who to contact     If you have questions or need follow up information about today's clinic visit or your schedule please contact [facility] directly at [phone].  Normal or non-critical lab and imaging results will be communicated to you by MyChart, letter or phone within 4 business days after the clinic has received the results. If you do not hear  from us within 7 days, please contact the clinic through [employer] or phone. If you have a critical or abnormal lab result, we will notify you by phone as soon as possible.  Submit refill requests through [employer] or call your pharmacy and they will forward the refill request to us. Please allow 3 business days for your refill to be completed.          Additional Information About Your Visit        [employer] Information     [employer] gives you secure access to your electronic health record. If you see a primary care provider, you can also send messages to your care team and make appointments. If you have questions, please call your primary care clinic.  If you do not have a primary care provider, please call [phone] and they will assist you.        Care EveryWhere ID     This is your Care EveryWhere ID. This could be used by other organizations to access your [place] medical records  [id number]        Your Vitals Were     Pulse Temperature Respirations Pulse Oximetry BMI (Body Mass Index)       99 98.5  F (36.9  C) (Oral) 21 99% 31.82 kg/m2        Blood Pressure from Last 3 Encounters:   04/11/18 128/72   10/04/17 104/74   09/14/17 106/67    Weight from Last 3 Encounters:   04/11/18 175 lb 6.4 oz (79.6 kg)   10/04/17 165 lb 12.8 oz (75.2 kg)   09/14/17 162 lb (73.5 kg)              We Performed the Following     Basic metabolic panel     EKG 12-lead complete w/read - Clinics     Hemoglobin          Today's Medication Changes          These changes are accurate as of 4/11/18 10:12 AM.  If you have any questions, ask your nurse or doctor.               Stop taking these medicines if you haven't already. Please contact your care team if you have questions.     amoxicillin 875 MG tablet   Commonly known as:  AMOXIL   Stopped by:  Samira Hamlin MD                    Primary Care Provider Office Phone # Fax #    Manuela Sepulveda [phone]       [address]  [address]        Equal Access to Services     [facility]: Hadii tiana andrews jaspreet Segundo, watiarada luqadaha, qaybta kaaljulio conner, bonita yenkoreyadryan marcelo. So [facility] [phone].    ATENCIÓN: Si habla español, tiene a vuong disposición servicios gratuitos de asistencia lingüística. Maria Aame al [phone].    We comply with applicable federal civil rights laws and [place] laws. We do not discriminate on the basis of race, color, national origin, age, disability, sex, sexual orientation, or gender identity.            Thank you!     Thank you for choosing [facility]  for your care. Our goal is always to provide you with excellent care. Hearing back from our patients is one way we can continue to improve our services. Please take a few minutes to complete the written survey that you may receive in the mail after your visit with us. Thank you!             Your Updated Medication List - Protect others around you: Learn how to safely use, store and throw away your medicines at www.disposemymeds.org.          This list is accurate as of 4/11/18 10:12 AM.  Always use your most recent med list.                   Brand Name Dispense Instructions for use Diagnosis    calcium + D 600-200 MG-UNIT Tabs   Generic drug:  calcium carbonate-vitamin D     30    1 TABLET DAILY    Preventative health care       cetirizine 10 MG tablet    zyrTEC    30 tablet    Take 1 tablet (10 mg) by mouth every evening        Krill Oil 1000 MG Caps           LOW-DOSE ASPIRIN PO      Take 81 mg by mouth daily        Misc Intestinal Lesa Regulat Tabs      Take  by mouth.    Routine general medical examination at a health care facility       Multi-vitamin Tabs tablet      Take 1 tablet by mouth daily        order for DME      Uses C-pap.        Turmeric 500 MG Caps           vitamin B complex with vitamin C Tabs tablet      Take 1 tablet by mouth daily        VITAMIN B12 PO      Take  by mouth.

## 2018-04-11 NOTE — NURSING NOTE
"Chief Complaint   Patient presents with     Pre-Op Exam       Initial /72  Pulse 99  Temp 98.5  F (36.9  C) (Oral)  Resp 21  Wt 175 lb 6.4 oz (79.6 kg)  SpO2 99%  BMI 31.82 kg/m2 Estimated body mass index is 31.82 kg/(m^2) as calculated from the following:    Height as of 10/4/17: 5' 2.25\" (1.581 m).    Weight as of this encounter: 175 lb 6.4 oz (79.6 kg).  Medication Reconciliation: complete     Viktoria Bradley CMA      "

## 2018-04-12 LAB
ANION GAP SERPL CALCULATED.3IONS-SCNC: 5 MMOL/L (ref 3–14)
BUN SERPL-MCNC: 15 MG/DL (ref 7–30)
CALCIUM SERPL-MCNC: 8.9 MG/DL (ref 8.5–10.1)
CHLORIDE SERPL-SCNC: 106 MMOL/L (ref 94–109)
CO2 SERPL-SCNC: 28 MMOL/L (ref 20–32)
CREAT SERPL-MCNC: 0.66 MG/DL (ref 0.52–1.04)
GFR SERPL CREATININE-BSD FRML MDRD: 90 ML/MIN/1.7M2
GLUCOSE SERPL-MCNC: 91 MG/DL (ref 70–99)
POTASSIUM SERPL-SCNC: 4.3 MMOL/L (ref 3.4–5.3)
SODIUM SERPL-SCNC: 139 MMOL/L (ref 133–144)

## 2018-04-26 ENCOUNTER — TRANSFERRED RECORDS (OUTPATIENT)
Dept: HEALTH INFORMATION MANAGEMENT | Facility: CLINIC | Age: 68
End: 2018-04-26

## 2018-08-16 ENCOUNTER — TRANSFERRED RECORDS (OUTPATIENT)
Dept: HEALTH INFORMATION MANAGEMENT | Facility: CLINIC | Age: 68
End: 2018-08-16

## 2018-09-19 ENCOUNTER — OFFICE VISIT (OUTPATIENT)
Dept: SLEEP MEDICINE | Facility: CLINIC | Age: 68
End: 2018-09-19
Payer: COMMERCIAL

## 2018-09-19 VITALS
DIASTOLIC BLOOD PRESSURE: 71 MMHG | SYSTOLIC BLOOD PRESSURE: 128 MMHG | BODY MASS INDEX: 31.36 KG/M2 | WEIGHT: 170.4 LBS | HEART RATE: 84 BPM | HEIGHT: 62 IN | OXYGEN SATURATION: 95 %

## 2018-09-19 DIAGNOSIS — E66.811 OBESITY (BMI 30.0-34.9): ICD-10-CM

## 2018-09-19 DIAGNOSIS — G47.33 OSA (OBSTRUCTIVE SLEEP APNEA): Primary | ICD-10-CM

## 2018-09-19 PROCEDURE — 99213 OFFICE O/P EST LOW 20 MIN: CPT | Performed by: INTERNAL MEDICINE

## 2018-09-19 NOTE — PROGRESS NOTES
[facility]  Outpatient Sleep Medicine Follow-up  September 14, 2017      Name: Lolis Villarreal MRN# [id number]   Age: 67 year old YOB: 1950   Date of visit: September 14, 2017  Primary care provider: Manuela Sepulveda         Assessment and Plan:     1. Obstructive Sleep Apnea:  - Full compliance of PAP use with low residual AHI.  - Clinical response: good  - Recommend using CPAP every night for at least 7-8 hours each night  - Daytime naps are not advised, but use CPAP if taking naps  - Patient was advised not to drive if drowsy or sleepy.  - Follow up in sleep clinic in 12 months    2. Overweight: Encouraged patient to lose weight. Counseled regarding weight loss through diet modification and increased physical activity. Patient was given nstuctions of weight loss and advised to follow up her PCP for further weight loss interventions. Weight loss instructions given.    Orders Placed This Encounter   Procedures     Comprehensive DME       Summary Counseling:  New sleep schedule recommendation: given    Check out [email or web address].[email or web address]/    All questions were answered.  The patient indicates understanding of the above issues and agrees with the plan set forth.           Chief Complaint:    Routine Follow up            History of Present Illness:     Lolis Villarreal is a 67 year old female is a 67 year old female with history of mild obstructive sleep apnea who comes to [facility] for follow up.  Patient was diagnosed mild sleep apnea on 5/7/2014 and was prescribed positional device Zzoma pillow and oral device ordered by Dr. Barrios. Her visit in [place] on 7/3/2014. She used positional device and oral appliance with initial improvement for her sleep and no repeat sleep study was done while using this devices. Patient had TMJ after oral appliance was adjusted many times and not able to use of oral appliance for the last 2 years.  She was prescribed auto-CPAP,  which was setup on 8/10/17.  Last visit on 9/14/17, she was compliant 100% usage over 4 hours with low residual AHI.  Today, she uses every night and she is her CPAP compliant 100% usage over 4 hours with low residual AHI.. She has good benefits for PAP use and no complaints except occasional dry mouth, denies mouth breathing..      PREVIOUS SLEEP STUDIES:  Date: 5/7/2014  TYPE: PSG  AHI: 10.3/hr  BMI: 29.8  Intervention: auto-CPAP  Lowest O2 sat: 76.7%    CPAP Compliance:  Dates: 8/19/2018-9/17/2018       100 % >4hour use   Days used: 30/30  Average daily use (days used): 8.18 hrs  Leak 95 percentile: 18 L/min  Residual AHI: 1.8/hr  Pressure:  5-15 cmH2O  95% percentile pressure: 9 cmH2O    [place] Sleepiness Scale score today: 0/24   [place] Sleepiness Scale score last visit: 0/24   Pre-PAP [place] Sleepiness Scale score: 8/24    PAP machine: ResMed    Interface:  Mask: nasal mask  Chin strap: No  Leak: No  Humidity: Yes    Difficulties with therapy:    [-] Difficulty tolerating the pressure  [-] Epistaxis:   [-] Nasal congestion   [x] Dry mouth:  [-] Mouth breathing:   [-] Pain/skin breakdown:     Improvements noted with CPAP:   [+] waking up more refreshed  [+] sleeping better with less arousals  [+] nocturia improved   [+] improved energy level during the day    SLEEP-WAKE SCHEDULE: unchanged     Other sleep problems: None     Drowsy driving / near incidents: No    Medications that affect sleep: No            Medications:     Current Outpatient Prescriptions   Medication Sig     CALCIUM + D 600-200 MG-UNIT OR TABS 1 TABLET DAILY     cetirizine (ZYRTEC) 10 MG tablet Take 1 tablet (10 mg) by mouth every evening     Cyanocobalamin (VITAMIN B12 PO) Take  by mouth.     Krill Oil 1000 MG CAPS      LOW-DOSE ASPIRIN PO Take 81 mg by mouth daily     multivitamin, therapeutic with minerals (MULTI-VITAMIN) TABS Take 1 tablet by mouth daily     order for DME Uses C-pap.     Probiotic Product (MISC INTESTINAL COLE REGULAT)  TABS Take  by mouth.     Turmeric 500 MG CAPS      vitamin B complex with vitamin C (VITAMIN  B COMPLEX) TABS tablet Take 1 tablet by mouth daily     No current facility-administered medications for this visit.         Allergies   Allergen Reactions     Dust Mites             Past Medical History:     Past Medical History:   Diagnosis Date     Chronic pain     sciatic pain left leg to toes     Numbness and tingling     left leg     PONV (postoperative nausea and vomiting)     hx nausea postop             Past Surgical History:      Past Surgical History:   Procedure Laterality Date     C NONSPECIFIC PROCEDURE      cervical spine surgery      C NONSPECIFIC PROCEDURE      varacose carroll removal right     ENHANCE LASER REFRACTIVE BILATERAL EXISTING PT IN PARAMETERS       HYSTERECTOMY, PAP NO LONGER INDICATED       SEPTOPLASTY, TURBINOPLASTY, COMBINED  2012    Procedure:COMBINED SEPTOPLASTY, TURBINOPLASTY; SEPTOPLASTY, TURBINATE Surgery ; Surgeon:OPAL FUENTES; Location:[facility] OR     TONSILLECTOMY & ADENOIDECTOMY  age 6       Social History   Substance Use Topics     Smoking status: Never Smoker     Smokeless tobacco: Never Used     Alcohol use No       Family History   Problem Relation Age of Onset     Cancer - colorectal Mother      born [birth year] cancer 75yo and arthritis and thyroid     Alzheimer Disease Mother      Alzheimers/dementia     Cerebrovascular Disease Mother 84     HEART DISEASE Father      born 1927 and HTN      Cancer Father 84     brain/lung cancer     Diabetes Sister      Sjogren's Sister 69     Diabetes Maternal Grandfather      dceased 75yo     Family History Negative Maternal Grandmother       83yo     Cancer Paternal Grandfather       88yo stomach     Arthritis Paternal Grandmother       93yo     Family History Negative Daughter      Family History Negative Daughter      Respiratory Daughter      asthma     Arthritis Son      rheumatoid arthritis     Arthritis Other       "Macular Degeneration Maternal Aunt      Glaucoma No family hx of             Physical Examination:   /71  Pulse 84  Ht 1.581 m (5' 2.24\")  Wt 77.3 kg (170 lb 6.4 oz)  SpO2 95%  BMI 30.92 kg/m2            Data: All pertinent previous laboratory data reviewed     No results found for: PH, PHARTERIAL, PO2, [id number], SAT, PCO2, HCO3, BASEEXCESS, SHIMA, BEB  Lab Results   Component Value Date    TSH 1.89 10/04/2017    TSH 1.61 10/03/2016     Lab Results   Component Value Date    GLC 91 04/11/2018    GLC 87 10/04/2017     Lab Results   Component Value Date    HGB 13.1 04/11/2018    HGB 13.4 10/04/2017     Lab Results   Component Value Date    BUN 15 04/11/2018    BUN 17 10/04/2017    CR 0.66 04/11/2018    CR 0.69 10/04/2017     Lab Results   Component Value Date    BEATRIZ 60 05/30/2014         She will follow up with me in about 12 month(s).         Copy to: Manuela Sepulveda MD 9/14/2017     [facility]  [id number] [place] [address]      15 minutes spent with patient, all of which were spent face-to-face counseling, consulting, coordinating plan of care and going over PAP download/sleep study and chart review.          "

## 2018-09-19 NOTE — PATIENT INSTRUCTIONS
MY TREATMENT INFORMATION FOR SLEEP APNEA-  Lolis ANETTE Phil    MY CONTACT NUMBERS ARE:  [phone]  DOCTOR : Chele BAIRD [facility]  SLEEP CENTER :  [place]    Your sleep apnea is controlled with CPAP/auto-CPAP.   Mask and supplies ordered.  Continue use of CPAP:  - Recommend using CPAP every night for at least 7-8 hours each night  - Daytime naps are not advised, but use CPAP if taking naps.    - Do not remove mask headgear when you go to bathroom at night, but just unplug the hose.    You met all objective measure goal  Compliance  Goal >70% (preferrably 100%)  Leak   Goal < 10% (less than 24 L/min)  AHI  Goal < 5 events per hour   Usage  Goal 7-8 hours.      Patient was advised not to drive if drowsy or sleepy.      Follow up in 12 months.      Your blood pressure was checked while you were in clinic today.  Please read the guidelines below about what these numbers mean and what you should do about them.  Your systolic blood pressure is the top number.  This is the pressure when the heart is pumping.  Your diastolic blood pressure is the bottom number.  This is the pressure in between beats.  If your systolic blood pressure is less than 120 and your diastolic blood pressure is less than 80, then your blood pressure is normal. There is nothing more that you need to do about it  If your systolic blood pressure is 120-139 or your diastolic blood pressure is 80-89, your blood pressure may be higher than it should be.  You should have your blood pressure re-checked within a year by a primary care provider.  If your systolic blood pressure is 140 or greater or your diastolic blood pressure is 90 or greater, you may have high blood pressure.  High blood pressure is treatable, but if left untreated over time it can put you at risk for heart attack, stroke, or kidney failure.  You should have your blood pressure re-checked by a primary care provider within the next four weeks.  Your BMI is There is no height or weight  on file to calculate BMI.  Weight management is a personal decision.  If you are interested in exploring weight loss strategies, the following discussion covers the approaches that may be successful. Body mass index (BMI) is one way to tell whether you are at a healthy weight, overweight, or obese. It measures your weight in relation to your height.  A BMI of 18.5 to 24.9 is in the healthy range. A person with a BMI of 25 to 29.9 is considered overweight, and someone with a BMI of 30 or greater is considered obese. More than two-thirds of American adults are considered overweight or obese.  Being overweight or obese increases the risk for further weight gain. Excess weight may lead to heart disease and diabetes.  Creating and following plans for healthy eating and physical activity may help you improve your health.  Weight control is part of healthy lifestyle and includes exercise, emotional health, and healthy eating habits. Careful eating habits lifelong are the mainstay of weight control. Though there are significant health benefits from weight loss, long-term weight loss with diet alone may be very difficult to achieve- studies show long-term success with dietary management in less than 10% of people. Attaining a healthy weight may be especially difficult to achieve in those with severe obesity. In some cases, medications, devices and surgical management might be considered.  What can you do?  If you are overweight or obese and are interested in methods for weight loss, you should discuss this with your provider.     Consider reducing daily calorie intake by 500 calories.     Keep a food journal.     Avoiding skipping meals, consider cutting portions instead.    Diet combined with exercise helps maintain muscle while optimizing fat loss. Strength training is particularly important for building and maintaining muscle mass. Exercise helps reduce stress, increase energy, and improves fitness. Increasing exercise  without diet control, however, may not burn enough calories to loose weight.       Start walking three days a week 10-20 minutes at a time    Work towards walking thirty minutes five days a week     Eventually, increase the speed of your walking for 1-2 minutes at time    In addition, we recommend that you review healthy lifestyles and methods for weight loss available through the National Institutes of Health patient information sites:  http://win.niddk.nih.gov/publications/index.htm    And look into health and wellness programs that may be available through your health insurance provider, employer, local community center, or zaki club.

## 2018-09-19 NOTE — MR AVS SNAPSHOT
After Visit Summary   9/19/2018    Lolis Villarreal    MRN: [id number]           Patient Information     Date Of Birth          1950        Visit Information        Provider Department      9/19/2018 9:30 AM Chele Umanzor MD [facility]        Today's Diagnoses     WENDY (obstructive sleep apnea)    -  1    Obesity (BMI 30.0-34.9)          Care Instructions    MY TREATMENT INFORMATION FOR SLEEP APNEA-  Lolis ANETTE Devancolin    MY CONTACT NUMBERS ARE:  [phone]  DOCTOR : Chele Umanzor  SLEEP CENTER :  [place]    Your sleep apnea is controlled with CPAP/auto-CPAP.   Mask and supplies ordered.  Continue use of CPAP:  - Recommend using CPAP every night for at least 7-8 hours each night  - Daytime naps are not advised, but use CPAP if taking naps.    - Do not remove mask headgear when you go to bathroom at night, but just unplug the hose.    You met all objective measure goal  Compliance  Goal >70% (preferrably 100%)  Leak   Goal < 10% (less than 24 L/min)  AHI  Goal < 5 events per hour   Usage  Goal 7-8 hours.      Patient was advised not to drive if drowsy or sleepy.      Follow up in 12 months.      Your blood pressure was checked while you were in clinic today.  Please read the guidelines below about what these numbers mean and what you should do about them.  Your systolic blood pressure is the top number.  This is the pressure when the heart is pumping.  Your diastolic blood pressure is the bottom number.  This is the pressure in between beats.  If your systolic blood pressure is less than 120 and your diastolic blood pressure is less than 80, then your blood pressure is normal. There is nothing more that you need to do about it  If your systolic blood pressure is 120-139 or your diastolic blood pressure is 80-89, your blood pressure may be higher than it should be.  You should have your blood pressure re-checked within a year by a primary care provider.  If  your systolic blood pressure is 140 or greater or your diastolic blood pressure is 90 or greater, you may have high blood pressure.  High blood pressure is treatable, but if left untreated over time it can put you at risk for heart attack, stroke, or kidney failure.  You should have your blood pressure re-checked by a primary care provider within the next four weeks.  Your BMI is There is no height or weight on file to calculate BMI.  Weight management is a personal decision.  If you are interested in exploring weight loss strategies, the following discussion covers the approaches that may be successful. Body mass index (BMI) is one way to tell whether you are at a healthy weight, overweight, or obese. It measures your weight in relation to your height.  A BMI of 18.5 to 24.9 is in the healthy range. A person with a BMI of 25 to 29.9 is considered overweight, and someone with a BMI of 30 or greater is considered obese. More than two-thirds of American adults are considered overweight or obese.  Being overweight or obese increases the risk for further weight gain. Excess weight may lead to heart disease and diabetes.  Creating and following plans for healthy eating and physical activity may help you improve your health.  Weight control is part of healthy lifestyle and includes exercise, emotional health, and healthy eating habits. Careful eating habits lifelong are the mainstay of weight control. Though there are significant health benefits from weight loss, long-term weight loss with diet alone may be very difficult to achieve- studies show long-term success with dietary management in less than 10% of people. Attaining a healthy weight may be especially difficult to achieve in those with severe obesity. In some cases, medications, devices and surgical management might be considered.  What can you do?  If you are overweight or obese and are interested in methods for weight loss, you should discuss this with your  provider.     Consider reducing daily calorie intake by 500 calories.     Keep a food journal.     Avoiding skipping meals, consider cutting portions instead.    Diet combined with exercise helps maintain muscle while optimizing fat loss. Strength training is particularly important for building and maintaining muscle mass. Exercise helps reduce stress, increase energy, and improves fitness. Increasing exercise without diet control, however, may not burn enough calories to loose weight.       Start walking three days a week 10-20 minutes at a time    Work towards walking thirty minutes five days a week     Eventually, increase the speed of your walking for 1-2 minutes at time    In addition, we recommend that you review healthy lifestyles and methods for weight loss available through the National Institutes of Health patient information sites:  http://win.niddk.nih.gov/publications/index.htm    And look into health and wellness programs that may be available through your health insurance provider, employer, local community center, or zaki club.                Follow-ups after your visit        Your next 10 appointments already scheduled     Oct 08, 2018  9:20 AM LYLE Kasper Physical Adult with Sidra Bernardo MD   [facility]    [address] [place] [id number] [phone]              Who to contact     If you have questions or need follow up information about today's clinic visit or your schedule please contact [facility] directly at [phone].  Normal or non-critical lab and imaging results will be communicated to you by MyChart, letter or phone within 4 business days after the clinic has received the results. If you do not hear from us within 7 days, please contact the clinic through MyChart or phone. If you have a critical or abnormal lab result, we will notify you by phone as soon as possible.  Submit  "refill requests through [employer] or call your pharmacy and they will forward the refill request to us. Please allow 3 business days for your refill to be completed.          Additional Information About Your Visit        [employer] Information     [employer] gives you secure access to your electronic health record. If you see a primary care provider, you can also send messages to your care team and make appointments. If you have questions, please call your primary care clinic.  If you do not have a primary care provider, please call [phone] and they will assist you.        Care EveryWhere ID     This is your Care EveryWhere ID. This could be used by other organizations to access your [facility] medical records  [id number]        Your Vitals Were     Pulse Height Pulse Oximetry BMI (Body Mass Index)          84 1.581 m (5' 2.24\") 95% 30.92 kg/m2         Blood Pressure from Last 3 Encounters:   09/19/18 128/71   04/11/18 128/72   10/04/17 104/74    Weight from Last 3 Encounters:   09/19/18 77.3 kg (170 lb 6.4 oz)   04/11/18 79.6 kg (175 lb 6.4 oz)   10/04/17 75.2 kg (165 lb 12.8 oz)              We Performed the Following     Comprehensive DME        Primary Care Provider Office Phone # Fax #    Manuela Sepluveda [phone]       [address] [address]        Equal Access to Services     [facility]: Hadii aad ku hadasho Soomaali, waaxda luqadaha, qaybta kaalmada adeegyada, bonita goddard . So [facility] [phone].    ATENCIÓN: Si habla español, tiene a vuong disposición servicios gratuitos de asistencia lingüística. Llame al [phone].    We comply with applicable federal civil rights laws and [place] laws. We do not discriminate on the basis of race, color, national origin, age, disability, sex, sexual orientation, or gender identity.            Thank you!     Thank you for choosing [facility]  for your care. Our goal is " always to provide you with excellent care. Hearing back from our patients is one way we can continue to improve our services. Please take a few minutes to complete the written survey that you may receive in the mail after your visit with us. Thank you!             Your Updated Medication List - Protect others around you: Learn how to safely use, store and throw away your medicines at www.disposemymeds.org.          This list is accurate as of 9/19/18  9:54 AM.  Always use your most recent med list.                   Brand Name Dispense Instructions for use Diagnosis    calcium + D 600-200 MG-UNIT Tabs   Generic drug:  calcium carbonate-vitamin D     30    1 TABLET DAILY    Preventative health care       cetirizine 10 MG tablet    zyrTEC    30 tablet    Take 1 tablet (10 mg) by mouth every evening        Krill Oil 1000 MG Caps           LOW-DOSE ASPIRIN PO      Take 81 mg by mouth daily        Misc Intestinal Lesa Regulat Tabs      Take  by mouth.    Routine general medical examination at a health care facility       Multi-vitamin Tabs tablet      Take 1 tablet by mouth daily        order for DME      Uses C-pap.        Turmeric 500 MG Caps           vitamin B complex with vitamin C Tabs tablet      Take 1 tablet by mouth daily        VITAMIN B12 PO      Take  by mouth.

## 2018-09-19 NOTE — NURSING NOTE
"Chief Complaint   Patient presents with     RECHECK     f/u annual pap       Initial /71  Pulse 84  Ht 1.581 m (5' 2.24\")  Wt 77.3 kg (170 lb 6.4 oz)  SpO2 95%  BMI 30.92 kg/m2 Estimated body mass index is 30.92 kg/(m^2) as calculated from the following:    Height as of this encounter: 1.581 m (5' 2.24\").    Weight as of this encounter: 77.3 kg (170 lb 6.4 oz).    Medication Reconciliation: complete        DME: yes    ESS 0  "

## 2018-10-07 ASSESSMENT — ENCOUNTER SYMPTOMS
ABDOMINAL PAIN: 0
MYALGIAS: 0
COUGH: 0
FREQUENCY: 0
DIARRHEA: 0
ARTHRALGIAS: 1
HEMATURIA: 0
HEARTBURN: 0
DIZZINESS: 0
HEMATOCHEZIA: 0
EYE PAIN: 0
PALPITATIONS: 0
CONSTIPATION: 0
WEAKNESS: 0
PARESTHESIAS: 0
JOINT SWELLING: 1
HEADACHES: 0
BREAST MASS: 0
FEVER: 0
SORE THROAT: 0
NERVOUS/ANXIOUS: 0
SHORTNESS OF BREATH: 0
DYSURIA: 0
CHILLS: 0
NAUSEA: 0

## 2018-10-07 ASSESSMENT — ACTIVITIES OF DAILY LIVING (ADL)
CURRENT_FUNCTION: NO ASSISTANCE NEEDED
I_NEED_ASSISTANCE_FOR_THE_FOLLOWING_DAILY_ACTIVITIES:: NO ASSISTANCE IS NEEDED

## 2018-10-08 ENCOUNTER — RADIANT APPOINTMENT (OUTPATIENT)
Dept: MAMMOGRAPHY | Facility: CLINIC | Age: 68
End: 2018-10-08
Payer: COMMERCIAL

## 2018-10-08 ENCOUNTER — OFFICE VISIT (OUTPATIENT)
Dept: PEDIATRICS | Facility: CLINIC | Age: 68
End: 2018-10-08
Payer: COMMERCIAL

## 2018-10-08 VITALS
BODY MASS INDEX: 31.82 KG/M2 | TEMPERATURE: 98.6 F | HEART RATE: 80 BPM | SYSTOLIC BLOOD PRESSURE: 138 MMHG | OXYGEN SATURATION: 99 % | HEIGHT: 62 IN | WEIGHT: 172.9 LBS | DIASTOLIC BLOOD PRESSURE: 70 MMHG

## 2018-10-08 DIAGNOSIS — Z11.4 ENCOUNTER FOR SCREENING FOR HIV: ICD-10-CM

## 2018-10-08 DIAGNOSIS — Z12.31 VISIT FOR SCREENING MAMMOGRAM: ICD-10-CM

## 2018-10-08 DIAGNOSIS — K64.8 INTERNAL HEMORRHOIDS: ICD-10-CM

## 2018-10-08 DIAGNOSIS — Z23 NEED FOR TDAP VACCINATION: ICD-10-CM

## 2018-10-08 DIAGNOSIS — Z13.220 SCREENING CHOLESTEROL LEVEL: ICD-10-CM

## 2018-10-08 DIAGNOSIS — Z13.1 SCREENING FOR DIABETES MELLITUS: ICD-10-CM

## 2018-10-08 DIAGNOSIS — Z23 NEED FOR PROPHYLACTIC VACCINATION AND INOCULATION AGAINST INFLUENZA: ICD-10-CM

## 2018-10-08 DIAGNOSIS — C44.92 SQUAMOUS CELL SKIN CANCER: ICD-10-CM

## 2018-10-08 DIAGNOSIS — Z78.0 ASYMPTOMATIC POSTMENOPAUSAL STATUS: ICD-10-CM

## 2018-10-08 DIAGNOSIS — Z11.59 NEED FOR HEPATITIS C SCREENING TEST: ICD-10-CM

## 2018-10-08 DIAGNOSIS — Z00.00 ROUTINE HISTORY AND PHYSICAL EXAMINATION OF ADULT: Primary | ICD-10-CM

## 2018-10-08 LAB
ERYTHROCYTE [DISTWIDTH] IN BLOOD BY AUTOMATED COUNT: 13.8 % (ref 10–15)
HCT VFR BLD AUTO: 40.1 % (ref 35–47)
HCV AB SERPL QL IA: NONREACTIVE
HGB BLD-MCNC: 13 G/DL (ref 11.7–15.7)
HIV 1+2 AB+HIV1 P24 AG SERPL QL IA: NONREACTIVE
MCH RBC QN AUTO: 29.4 PG (ref 26.5–33)
MCHC RBC AUTO-ENTMCNC: 32.4 G/DL (ref 31.5–36.5)
MCV RBC AUTO: 91 FL (ref 78–100)
PLATELET # BLD AUTO: 234 10E9/L (ref 150–450)
RBC # BLD AUTO: 4.42 10E12/L (ref 3.8–5.2)
WBC # BLD AUTO: 6.4 10E9/L (ref 4–11)

## 2018-10-08 PROCEDURE — 90471 IMMUNIZATION ADMIN: CPT | Performed by: INTERNAL MEDICINE

## 2018-10-08 PROCEDURE — 84443 ASSAY THYROID STIM HORMONE: CPT | Performed by: INTERNAL MEDICINE

## 2018-10-08 PROCEDURE — 36415 COLL VENOUS BLD VENIPUNCTURE: CPT | Performed by: INTERNAL MEDICINE

## 2018-10-08 PROCEDURE — 80053 COMPREHEN METABOLIC PANEL: CPT | Performed by: INTERNAL MEDICINE

## 2018-10-08 PROCEDURE — 90472 IMMUNIZATION ADMIN EACH ADD: CPT | Performed by: INTERNAL MEDICINE

## 2018-10-08 PROCEDURE — 85027 COMPLETE CBC AUTOMATED: CPT | Performed by: INTERNAL MEDICINE

## 2018-10-08 PROCEDURE — 99397 PER PM REEVAL EST PAT 65+ YR: CPT | Mod: 25 | Performed by: INTERNAL MEDICINE

## 2018-10-08 PROCEDURE — 77063 BREAST TOMOSYNTHESIS BI: CPT | Mod: TC

## 2018-10-08 PROCEDURE — 90715 TDAP VACCINE 7 YRS/> IM: CPT | Performed by: INTERNAL MEDICINE

## 2018-10-08 PROCEDURE — 90662 IIV NO PRSV INCREASED AG IM: CPT | Performed by: INTERNAL MEDICINE

## 2018-10-08 PROCEDURE — 86803 HEPATITIS C AB TEST: CPT | Performed by: INTERNAL MEDICINE

## 2018-10-08 PROCEDURE — 77067 SCR MAMMO BI INCL CAD: CPT | Mod: TC

## 2018-10-08 PROCEDURE — 87389 HIV-1 AG W/HIV-1&-2 AB AG IA: CPT | Performed by: INTERNAL MEDICINE

## 2018-10-08 PROCEDURE — 80061 LIPID PANEL: CPT | Performed by: INTERNAL MEDICINE

## 2018-10-08 RX ORDER — UBIDECARENONE 50 MG
CAPSULE ORAL
COMMUNITY
End: 2019-10-21

## 2018-10-08 RX ORDER — ERGOCALCIFEROL (VITAMIN D2) 50 MCG
CAPSULE ORAL
COMMUNITY
End: 2021-02-02 | Stop reason: DRUGHIGH

## 2018-10-08 ASSESSMENT — ENCOUNTER SYMPTOMS
CONSTIPATION: 0
PALPITATIONS: 0
FREQUENCY: 0
HEADACHES: 0
SORE THROAT: 0
HEMATURIA: 0
ABDOMINAL PAIN: 0
EYE PAIN: 0
WEAKNESS: 0
NERVOUS/ANXIOUS: 0
FEVER: 0
CHILLS: 0
NAUSEA: 0
BREAST MASS: 0
HEMATOCHEZIA: 0
MYALGIAS: 0
PARESTHESIAS: 0
JOINT SWELLING: 1
SHORTNESS OF BREATH: 0
DIARRHEA: 0
HEARTBURN: 0
ARTHRALGIAS: 1
COUGH: 0
DYSURIA: 0
DIZZINESS: 0

## 2018-10-08 ASSESSMENT — ACTIVITIES OF DAILY LIVING (ADL): CURRENT_FUNCTION: NO ASSISTANCE NEEDED

## 2018-10-08 NOTE — LETTER
October 8, 2018      Lolis Villarreal  [address]  [place] [address]        To Whom It May Concern:    Lolis Villarreal is under my professional care. She is healthy and able to work with a       Sincerely,        Sidra Bernardo MD

## 2018-10-08 NOTE — PROGRESS NOTES
SUBJECTIVE:   Lolis Villarreal is a 68 year old female who presents for Preventive Visit.  Are you in the first 12 months of your Medicare coverage?  No    Physical   Annual:     Getting at least 3 servings of Calcium per day:  Yes    Bi-annual eye exam:  Yes    Dental care twice a year:  Yes    Sleep apnea or symptoms of sleep apnea:  Sleep apnea    Diet:  Regular (no restrictions)    Frequency of exercise:  6-7 days/week    Duration of exercise:  30-45 minutes    Taking medications regularly:  Yes    Medication side effects:  None    Additional concerns today:  YES    Ability to successfully perform activities of daily living: no assistance needed    Home Safety:  No safety concerns identified    Hearing Impairment: no hearing concerns    Fall risk:  Fallen 2 or more times in the past year?: No  Any fall with injury in the past year?: No    COGNITIVE SCREEN  1) Repeat 3 items (Leader, Season, Table)    2) Clock draw: NORMAL  3) 3 item recall: Recalls 3 objects  Results: 3 items recalled: COGNITIVE IMPAIRMENT LESS LIKELY    Mini-CogTM Copyright TEDDY Croft. Licensed by the author for use in [facility]; reprinted with permission (helena@[facility]). All rights reserved.      Reviewed and updated as needed this visit by clinical staff  Tobacco  Allergies  Meds  Problems  Med Hx  Surg Hx  Fam Hx  Soc Hx        Reviewed and updated as needed this visit by Provider  Allergies  Meds  Problems        Social History   Substance Use Topics     Smoking status: Never Smoker     Smokeless tobacco: Never Used     Alcohol use No       Alcohol Use 10/7/2018   If you drink alcohol do you typically have greater than 3 drinks per day OR greater than 7 drinks per week? Not Applicable   No flowsheet data found.    Hemorrhoids - internal hemorrhoids - has had some bleeding in the past. Has itching and hard time getting clean. No constipation. Has had for a long time.     Weight: would like a letter for   stating ok for exercise. Walks every day. Would like to do more to lose weight.    family h/o carotid artery disease. Would like screening auscultation for bruits. Most cousins, uncles and father with carotid disease.     H/o squamous cell skin cancer - following with Dermatology    Chol: has been a little elevated in the past. Does not want to take a statin because multiple relatives have had severe reactions to statins in the past.    Today's PHQ-2 Score:   PHQ-2 ( 1999 [employer]) 10/7/2018   Q1: Little interest or pleasure in doing things 0   Q2: Feeling down, depressed or hopeless 0   PHQ-2 Score 0   Q1: Little interest or pleasure in doing things Not at all   Q2: Feeling down, depressed or hopeless Not at all   PHQ-2 Score 0     Do you feel safe in your environment - Yes    Do you have a Health Care Directive?: Yes: Advance Directive has been received and scanned.    Current providers sharing in care for this patient include:   Patient Care Team:  Manuela Sepulveda MD as PCP - General    The following health maintenance items are reviewed in Epic and correct as of today:  Health Maintenance   Topic Date Due     HEPATITIS C SCREENING  01/15/1968     DEXA Q3 YR  08/21/2016     INFLUENZA VACCINE (1) 09/01/2018     FALL RISK ASSESSMENT  10/04/2018     TETANUS IMMUNIZATION (SYSTEM ASSIGNED)  05/18/2019     MAMMO SCREEN Q2 YR (SYSTEM ASSIGNED)  10/04/2019     PHQ-2 Q1 YR  10/08/2019     COLONOSCOPY Q5 YR  11/05/2020     LIPID SCREEN Q5 YR FEMALE (SYSTEM ASSIGNED)  10/04/2022     ADVANCE DIRECTIVE PLANNING Q5 YRS  04/11/2023     PNEUMOCOCCAL  Completed     Patient Active Problem List   Diagnosis     CARDIOVASCULAR SCREENING; LDL GOAL LESS THAN 160     Advanced directives, counseling/discussion     Dry mouth     WENDY (obstructive sleep apnea)     Past Surgical History:   Procedure Laterality Date     C NONSPECIFIC PROCEDURE  2003    cervical spine surgery      C NONSPECIFIC PROCEDURE      varacose carroll removal right      ENHANCE LASER REFRACTIVE BILATERAL EXISTING PT IN PARAMETERS       HYSTERECTOMY, PAP NO LONGER INDICATED       SEPTOPLASTY, TURBINOPLASTY, COMBINED  2012    Procedure:COMBINED SEPTOPLASTY, TURBINOPLASTY; SEPTOPLASTY, TURBINATE Surgery ; Surgeon:OPAL FUENTES; Location:[facility] OR     TONSILLECTOMY & ADENOIDECTOMY  age 6       Social History   Substance Use Topics     Smoking status: Never Smoker     Smokeless tobacco: Never Used     Alcohol use No     Family History   Problem Relation Age of Onset     Cancer - colorectal Mother      born 1927 cancer 77yo and arthritis and thyroid     Alzheimer Disease Mother      Alzheimers/dementia     Cerebrovascular Disease Mother 84     HEART DISEASE Father      born 1927 and HTN      Cancer Father 84     brain/lung cancer     Diabetes Sister      Sjogren's Sister 69     Diabetes Maternal Grandfather      dceased 77yo     Family History Negative Maternal Grandmother       83yo     Cancer Paternal Grandfather       86yo stomach     Arthritis Paternal Grandmother       91yo     Family History Negative Daughter      Family History Negative Daughter      Respiratory Daughter      asthma     Arthritis Son      rheumatoid arthritis     Arthritis Other      Macular Degeneration Maternal Aunt      Glaucoma No family hx of          Review of Systems   Constitutional: Negative for chills and fever.   HENT: Negative for congestion, ear pain, hearing loss and sore throat.    Eyes: Negative for pain and visual disturbance.   Respiratory: Negative for cough and shortness of breath.    Cardiovascular: Negative for chest pain, palpitations and peripheral edema.   Gastrointestinal: Negative for abdominal pain, constipation, diarrhea, heartburn, hematochezia and nausea.        Hemorrhoids   Breasts:  Negative for tenderness, breast mass and discharge.   Genitourinary: Negative for dysuria, frequency, genital sores, hematuria, pelvic pain, urgency, vaginal bleeding and  "vaginal discharge.   Musculoskeletal: Positive for arthralgias and joint swelling. Negative for myalgias.   Skin: Negative for rash.   Neurological: Negative for dizziness, weakness, headaches and paresthesias.   Psychiatric/Behavioral: Negative for mood changes. The patient is not nervous/anxious.      OBJECTIVE:   /70 (BP Location: Right arm, Patient Position: Sitting, Cuff Size: Adult Regular)  Pulse 80  Temp 98.6  F (37  C) (Tympanic)  Ht 5' 2.25\" (1.581 m)  Wt 172 lb 14.4 oz (78.4 kg)  SpO2 99%  BMI 31.37 kg/m2 Estimated body mass index is 31.37 kg/(m^2) as calculated from the following:    Height as of this encounter: 5' 2.25\" (1.581 m).    Weight as of this encounter: 172 lb 14.4 oz (78.4 kg).  Physical Exam  GENERAL APPEARANCE: healthy, alert and no distress  EYES: Eyes grossly normal to inspection, PERRL and conjunctivae and sclerae normal  HENT: ear canals and TM's normal, nose and mouth without ulcers or lesions, oropharynx clear and oral mucous membranes moist  NECK: no adenopathy, no asymmetry, masses, or scars and thyroid normal to palpation, no carotid bruits  RESP: lungs clear to auscultation - no rales, rhonchi or wheezes  BREAST: deferred to mammo today  CV: regular rate and rhythm, normal S1 S2, no S3 or S4, no murmur, click or rub, no peripheral edema and peripheral pulses strong  ABDOMEN: soft, nontender, no hepatosplenomegaly, no masses and bowel sounds normal  MS: no musculoskeletal defects are noted and gait is age appropriate without ataxia  SKIN: no suspicious lesions or rashes  NEURO: Normal strength and tone, sensory exam grossly normal, mentation intact and speech normal  PSYCH: mentation appears normal and affect normal/bright    Diagnostic Test Results:  Results for orders placed or performed in visit on 10/08/18   Hepatitis C Screen Reflex to HCV RNA Quant and Genotype   Result Value Ref Range    Hepatitis C Antibody Nonreactive NR^Nonreactive   Comprehensive metabolic " panel   Result Value Ref Range    Sodium 137 133 - 144 mmol/L    Potassium 4.4 3.4 - 5.3 mmol/L    Chloride 104 94 - 109 mmol/L    Carbon Dioxide 26 20 - 32 mmol/L    Anion Gap 7 3 - 14 mmol/L    Glucose 88 70 - 99 mg/dL    Urea Nitrogen 13 7 - 30 mg/dL    Creatinine 0.68 0.52 - 1.04 mg/dL    GFR Estimate 86 >60 mL/min/1.7m2    GFR Estimate If Black >90 >60 mL/min/1.7m2    Calcium 9.1 8.5 - 10.1 mg/dL    Bilirubin Total 0.4 0.2 - 1.3 mg/dL    Albumin 4.0 3.4 - 5.0 g/dL    Protein Total 7.7 6.8 - 8.8 g/dL    Alkaline Phosphatase 73 40 - 150 U/L    ALT 34 0 - 50 U/L    AST 20 0 - 45 U/L   HIV Antigen Antibody Combo   Result Value Ref Range    HIV Antigen Antibody Combo Nonreactive NR^Nonreactive       Lipid panel reflex to direct LDL Fasting   Result Value Ref Range    Cholesterol 239 (H) <200 mg/dL    Triglycerides 197 (H) <150 mg/dL    HDL Cholesterol 53 >49 mg/dL    LDL Cholesterol Calculated 147 (H) <100 mg/dL    Non HDL Cholesterol 186 (H) <130 mg/dL   TSH with free T4 reflex   Result Value Ref Range    TSH 2.19 0.40 - 4.00 mU/L   CBC with platelets   Result Value Ref Range    WBC 6.4 4.0 - 11.0 10e9/L    RBC Count 4.42 3.8 - 5.2 10e12/L    Hemoglobin 13.0 11.7 - 15.7 g/dL    Hematocrit 40.1 35.0 - 47.0 %    MCV 91 78 - 100 fl    MCH 29.4 26.5 - 33.0 pg    MCHC 32.4 31.5 - 36.5 g/dL    RDW 13.8 10.0 - 15.0 %    Platelet Count 234 150 - 450 10e9/L       ASSESSMENT / PLAN:   1. Routine history and physical examination of adult  Pap not indicated due to hyst and age. Mammo today. Colonoscopy due in 2020  Shingrix, Prevnar and pneumovax UTD  Tdap and flu today  - TSH with free T4 reflex  - CBC with platelets    2. Internal hemorrhoids  Referral to GI to discuss banding.  - GASTROENTEROLOGY ADULT REF CONSULT ONLY    3. Asymptomatic postmenopausal status  Due for dexa  - DEXA HIP/PELVIS/SPINE - Future; Future    4. Need for hepatitis C screening test  - Hepatitis C Screen Reflex to HCV RNA Quant and Genotype    5. Need  "for prophylactic vaccination and inoculation against influenza  -      ADMIN VACCINE, FIRST [37584]  - FLU VACCINE, INCREASED ANTIGEN, PRESV FREE, AGE 65+ [15332]  - Vaccine Administration, Each Additional [54528]    6. Need for Tdap vaccination  - TDAP VACCINE (ADACEL)    7. Screening for diabetes mellitus  - Comprehensive metabolic panel    8. Encounter for screening for HIV  - HIV Antigen Antibody Combo    9. Screening cholesterol level  - Lipid panel reflex to direct LDL Fasting    10. Squamous cell skin cancer  Follows with Derm    End of Life Planning:  Patient currently has an advanced directive: Yes.  Practitioner is supportive of decision.    COUNSELING:  Reviewed preventive health counseling, as reflected in patient instructions  Special attention given to:       Regular exercise       Healthy diet/nutrition       Immunizations    Vaccinated for: Influenza and TDAP             Osteoporosis Prevention/Bone Health       Colon cancer screening       Hepatitis C screening       HIV screening for high risk patient       The 10-year ASCVD risk score (Mike STOKES Jr, et al., 2013) is: 8.7%    Values used to calculate the score:      Age: 68 years      Sex: Female      Is Non- : No      Diabetic: No      Tobacco smoker: No      Systolic Blood Pressure: 138 mmHg      Is BP treated: No      HDL Cholesterol: 53 mg/dL      Total Cholesterol: 239 mg/dL    BP Readings from Last 1 Encounters:   10/08/18 140/70     Estimated body mass index is 31.37 kg/(m^2) as calculated from the following:    Height as of this encounter: 5' 2.25\" (1.581 m).    Weight as of this encounter: 172 lb 14.4 oz (78.4 kg).    BP Screening:   Last 3 BP Readings:    BP Readings from Last 3 Encounters:   10/08/18 138/70   09/19/18 128/71   04/11/18 128/72       The following was recommended to the patient:  Re-screen BP within a year and recommended lifestyle modifications  Weight management plan: Discussed healthy diet and " exercise guidelines and patient will follow up in 12 months in clinic to re-evaluate.     reports that she has never smoked. She has never used smokeless tobacco.      Appropriate preventive services were discussed with this patient, including applicable screening as appropriate for cardiovascular disease, diabetes, osteopenia/osteoporosis, and glaucoma.  As appropriate for age/gender, discussed screening for colorectal cancer, prostate cancer, breast cancer, and cervical cancer. Checklist reviewing preventive services available has been given to the patient.    Reviewed patients plan of care and provided an AVS. The Basic Care Plan (routine screening as documented in Health Maintenance) for Lolis meets the Care Plan requirement. This Care Plan has been established and reviewed with the Patient.    Counseling Resources:  ATP IV Guidelines  Pooled Cohorts Equation Calculator  Breast Cancer Risk Calculator  FRAX Risk Assessment  ICSI Preventive Guidelines  Dietary Guidelines for Americans, 2010  USDA's MyPlate  ASA Prophylaxis  Lung CA Screening    Sidra Bernardo MD  [facility] [place]    Answers for HPI/ROS submitted by the patient on 10/7/2018   PHQ-2 Score: 0

## 2018-10-08 NOTE — PROGRESS NOTES

## 2018-10-08 NOTE — PATIENT INSTRUCTIONS
Preventive Health Recommendations  Female Ages 65 +    Yearly exam:     See your health care provider every year in order to  o Review health changes.   o Discuss preventive care.    o Review your medicines if your doctor has prescribed any.      You no longer need a yearly Pap test unless you've had an abnormal Pap test in the past 10 years. If you have vaginal symptoms, such as bleeding or discharge, be sure to talk with your provider about a Pap test.      Every 1 to 2 years, have a mammogram.  If you are over 69, talk with your health care provider about whether or not you want to continue having screening mammograms.      Every 10 years, have a colonoscopy. Or, have a yearly FIT test (stool test). These exams will check for colon cancer.       Have a cholesterol test every 5 years, or more often if your doctor advises it.       Have a diabetes test (fasting glucose) every three years. If you are at risk for diabetes, you should have this test more often.       At age 65, have a bone density scan (DEXA) to check for osteoporosis (brittle bone disease).    Shots:    Get a flu shot each year.    Get a tetanus shot every 10 years.    Talk to your doctor about your pneumonia vaccines. There are now two you should receive - Pneumovax (PPSV 23) and Prevnar (PCV 13).    Talk to your pharmacist about the shingles vaccine.    Talk to your doctor about the hepatitis B vaccine.    Nutrition:     Eat at least 5 servings of fruits and vegetables each day.      Eat whole-grain bread, whole-wheat pasta and brown rice instead of white grains and rice.      Get adequate about Calcium and Vitamin D.     Lifestyle    Exercise at least 150 minutes a week (30 minutes a day, 5 days a week). This will help you control your weight and prevent disease.      Limit alcohol to one drink per day.      No smoking.       Wear sunscreen to prevent skin cancer.       See your dentist twice a year for an exam and cleaning.      See your eye  doctor every 1 to 2 years to screen for conditions such as glaucoma, macular degeneration, cataracts, etc     --------------------  1. Vaccines today: flu shot, tetanus  2. Labs today: cholesterol, diabetes screen, liver function, kidney functio, thyroid function, Hep C screening and HIV screening  3. Schedule bone density scan downstairs  4. Schedule with GI to discuss hemorrhoids

## 2018-10-09 ENCOUNTER — RADIANT APPOINTMENT (OUTPATIENT)
Dept: BONE DENSITY | Facility: CLINIC | Age: 68
End: 2018-10-09
Attending: INTERNAL MEDICINE
Payer: COMMERCIAL

## 2018-10-09 DIAGNOSIS — Z78.0 ASYMPTOMATIC POSTMENOPAUSAL STATUS: ICD-10-CM

## 2018-10-09 PROBLEM — C44.92 SQUAMOUS CELL SKIN CANCER: Status: ACTIVE | Noted: 2018-10-09

## 2018-10-09 LAB
ALBUMIN SERPL-MCNC: 4 G/DL (ref 3.4–5)
ALP SERPL-CCNC: 73 U/L (ref 40–150)
ALT SERPL W P-5'-P-CCNC: 34 U/L (ref 0–50)
ANION GAP SERPL CALCULATED.3IONS-SCNC: 7 MMOL/L (ref 3–14)
AST SERPL W P-5'-P-CCNC: 20 U/L (ref 0–45)
BILIRUB SERPL-MCNC: 0.4 MG/DL (ref 0.2–1.3)
BUN SERPL-MCNC: 13 MG/DL (ref 7–30)
CALCIUM SERPL-MCNC: 9.1 MG/DL (ref 8.5–10.1)
CHLORIDE SERPL-SCNC: 104 MMOL/L (ref 94–109)
CHOLEST SERPL-MCNC: 239 MG/DL
CO2 SERPL-SCNC: 26 MMOL/L (ref 20–32)
CREAT SERPL-MCNC: 0.68 MG/DL (ref 0.52–1.04)
GFR SERPL CREATININE-BSD FRML MDRD: 86 ML/MIN/1.7M2
GLUCOSE SERPL-MCNC: 88 MG/DL (ref 70–99)
HDLC SERPL-MCNC: 53 MG/DL
LDLC SERPL CALC-MCNC: 147 MG/DL
NONHDLC SERPL-MCNC: 186 MG/DL
POTASSIUM SERPL-SCNC: 4.4 MMOL/L (ref 3.4–5.3)
PROT SERPL-MCNC: 7.7 G/DL (ref 6.8–8.8)
SODIUM SERPL-SCNC: 137 MMOL/L (ref 133–144)
TRIGL SERPL-MCNC: 197 MG/DL
TSH SERPL DL<=0.005 MIU/L-ACNC: 2.19 MU/L (ref 0.4–4)

## 2018-10-09 PROCEDURE — 77080 DXA BONE DENSITY AXIAL: CPT | Performed by: FAMILY MEDICINE

## 2018-10-09 ASSESSMENT — ENCOUNTER SYMPTOMS: ROS GI COMMENTS: HEMORRHOIDS

## 2018-11-20 ENCOUNTER — MYC MEDICAL ADVICE (OUTPATIENT)
Dept: PEDIATRICS | Facility: CLINIC | Age: 68
End: 2018-11-20

## 2018-12-06 ENCOUNTER — TRANSFERRED RECORDS (OUTPATIENT)
Dept: HEALTH INFORMATION MANAGEMENT | Facility: CLINIC | Age: 68
End: 2018-12-06

## 2018-12-10 ENCOUNTER — THERAPY VISIT (OUTPATIENT)
Dept: PHYSICAL THERAPY | Facility: CLINIC | Age: 68
End: 2018-12-10
Payer: COMMERCIAL

## 2018-12-10 DIAGNOSIS — M54.16 LUMBAR RADICULOPATHY: Primary | ICD-10-CM

## 2018-12-10 PROCEDURE — 97161 PT EVAL LOW COMPLEX 20 MIN: CPT | Mod: GP | Performed by: PHYSICAL THERAPIST

## 2018-12-10 PROCEDURE — 97110 THERAPEUTIC EXERCISES: CPT | Mod: GP | Performed by: PHYSICAL THERAPIST

## 2018-12-10 NOTE — LETTER
[facility]  [address]  [address]  [place] [address]  [phone]    2018    Re: Lolis Villarreal   :   1950  MRN:  [id number]     REFERRING PHYSICIAN:   Balbir Butler  [facility]  Date of Initial Evaluation:  12/10/18  Visits:  Rxs Used: 1  Reason for Referral:  Lumbar radiculopathy    EVALUATION SUMMARY    [facility] Initial Evaluation  Subjective:  The history is provided by the patient. No  was used.   Lolis Villarreal is a 68 year old female with a lumbar condition.  Condition occurred with:  Repetition/overuse.  Condition occurred: for unknown reasons.  This is a chronic condition  MD orders 18. Pt reports several year history of low back pain with left sciatic pain. She complains of aching pain in low back and left thigh numnbess into toes. She states she has had numbness into foot for over 20 years and had injections in spine in the past. She feels better wioth stretching. .    Radiates to:  Foot left, lower leg left, thigh left and gluteals left.  Pain is described as aching and is constant and reported as 6/10.  Associated symptoms:  Numbness. Pain is worse in the A.M. and worse in the P.M..  Symptoms are exacerbated by sitting, lifting and bending and relieved by activity/movement.  Since onset symptoms are unchanged. General health as reported by patient is good.  Pertinent medical history includes:  Sleep disorder/apnea and osteoarthritis.  Medical allergies: no.    Current medications:  None as reported by the patient.  Current occupation is Retired teacjher.      Barriers include:  None as reported by the patient.  Objective:  System  Lumbar/SI Evaluation  ROM:    AROM Lumbar:   Flexion:          To toes pain left buttocks  Ext:                    75%    Side Bend:        Left:     Right:   Rotation:           Left:     Right:   Side Glide:        Left:  90%     Right:  75% pain left low back  Strength: weakness abdominals , left glut med 3-/5, right glut med  3+/5, right glut max 4/5, left glut max 4-/5  Lumbar Myotomes:    T12-L3 (Hip Flex):  Left: 5    Right: 5  L2-4 (Quads):  Left:  4+    Right:  5  L4 (Ankle DF):  Left:  4+    Right:  5  Re: Lolis Villarreal   :   1950  MRN:  [id number]      L5 (Great Toe Ext): Left: 5    Right: 5   S1 (Toe Raise):  Left: 5-    Right: 5  Lumbar DTR's:  not assessed  Cord Signs:  not assessed  Neural Tension/Mobility:    Left side:  SLR w/DF positive.   Karen Lumbar Evaluation  Posture:  Sitting: fair  Standing: fair  Lordosis: Reduced  Lateral Shift: no  Correction of Posture: better  Movement Loss:  Flexion (Flex): pain  Extension (EXT): min  Side Glide R (SG R): min  Side Glide L (SG L): min and pain  Test Movements:  EIL:   Repeat EIL: During: decreases  After: better  Mechanical Response: IncROM  Conclusion: derangement  Principle of Treatment:  Extension: REL every 1-2 hours or THOMAS  Assessment/Plan:    Patient is a 68 year old female with lumbar complaints.    Patient has the following significant findings with corresponding treatment plan.                Diagnosis 1:  Lumbar radioculopathy  Pain -  hot/cold therapy, manual therapy, self management and education  Decreased ROM/flexibility - manual therapy, therapeutic exercise and home program  Decreased strength - therapeutic exercise, therapeutic activities and home program  Therapy Evaluation Codes:   1) History comprised of:   Personal factors that impact the plan of care:      Time since onset of symptoms.    Comorbidity factors that impact the plan of care are:      Numbness/tingling and Osteoarthritis.     Medications impacting care: None.  2) Examination of Body Systems comprised of:   Body structures and functions that impact the plan of care:      Lumbar spine.   Activity limitations that impact the plan of care are:      Bending, Driving, Lifting,  Reading/Computer work and Sitting.  3) Clinical presentation characteristics are:   Stable/Uncomplicated.  4) Decision-Making    Low complexity using standardized patient assessment instrument and/or measureable assessment of functional outcome.  Re: Lolis Villarreal   :   1950  MRN:  [id number]    Cumulative Therapy Evaluation is: Low complexity.  Previous and current functional limitations:  (See Goal Flow Sheet for this information)    Short term and Long term goals: (See Goal Flow Sheet for this information)   Communication ability:  Patient appears to be able to clearly communicate and understand verbal and written communication and follow directions correctly.  Treatment Explanation - The following has been discussed with the patient:   RX ordered/plan of care  Anticipated outcomes  Possible risks and side effects  This patient would benefit from PT intervention to resume normal activities.   Rehab potential is excellent.  Frequency:  1 X week, once daily  Duration:  for 2 weeks tapering to 2 X a month over 6 weeks  Discharge Plan:  Achieve all LTG.  Independent in home treatment program.  Reach maximal therapeutic benefit.    Thank you for your referral.    INQUIRIES  Therapist: NORI TELLEZ PT   [facility] [place]  [address] [place] [address]  [address] [place] [id number]  Phone: [phone]  Fax: [phone]

## 2018-12-10 NOTE — PROGRESS NOTES
[facility] Initial Evaluation  Subjective:  The history is provided by the patient. No  was used.   Lolis Villarreal is a 68 year old female with a lumbar condition.  Condition occurred with:  Repetition/overuse.  Condition occurred: for unknown reasons.  This is a chronic condition  MD orders 12-6-18. Pt reports several year history of low back pain with left sciatic pain. She complains of aching pain in low back and left thigh numnbess into toes. She states she has had numbness into foot for over 20 years and had injections in spine in the past. She feels better wioth stretching. .      Radiates to:  Foot left, lower leg left, thigh left and gluteals left.  Pain is described as aching and is constant and reported as 6/10.  Associated symptoms:  Numbness. Pain is worse in the A.M. and worse in the P.M..  Symptoms are exacerbated by sitting, lifting and bending and relieved by activity/movement.  Since onset symptoms are unchanged.        General health as reported by patient is good.  Pertinent medical history includes:  Sleep disorder/apnea and osteoarthritis.  Medical allergies: no.    Current medications:  None as reported by the patient.  Current occupation is Retired [employer].        Barriers include:  None as reported by the patient.                            Objective:  System         Lumbar/SI Evaluation  ROM:    AROM Lumbar:   Flexion:          To toes pain left buttocks  Ext:                    75%    Side Bend:        Left:     Right:   Rotation:           Left:     Right:   Side Glide:        Left:  90%    Right:  75% pain left low back        Strength: weakness abdominals , left glut med 3-/5, right glut med  3+/5, right glut max 4/5, left glut max 4-/5  Lumbar Myotomes:    T12-L3 (Hip Flex):  Left: 5    Right: 5  L2-4 (Quads):  Left:  4+    Right:  5  L4 (Ankle DF):  Left:  4+    Right:  5  L5 (Great Toe Ext): Left: 5    Right: 5   S1 (Toe Raise):  Left: 5-     Right: 5  Lumbar DTR's:  not assessed      Cord Signs:  not assessed      Neural Tension/Mobility:    Left side:  SLR w/DF positive.                                                            [facility] Lumbar Evaluation    Posture:  Sitting: fair  Standing: fair  Lordosis: Reduced  Lateral Shift: no  Correction of Posture: better    Movement Loss:  Flexion (Flex): pain  Extension (EXT): min  Side Glide R (SG R): min  Side Glide L (SG L): min and pain  Test Movements:        EIL:   Repeat EIL: During: decreases  After: better  Mechanical Response: IncROM        Conclusion: derangement  Principle of Treatment:      Extension: REL every 1-2 hours or THOMAS                                           ROS    Assessment/Plan:    Patient is a 68 year old female with lumbar complaints.    Patient has the following significant findings with corresponding treatment plan.                Diagnosis 1:  Lumbar radioculopathy  Pain -  hot/cold therapy, manual therapy, self management and education  Decreased ROM/flexibility - manual therapy, therapeutic exercise and home program  Decreased strength - therapeutic exercise, therapeutic activities and home program    Therapy Evaluation Codes:   1) History comprised of:   Personal factors that impact the plan of care:      Time since onset of symptoms.    Comorbidity factors that impact the plan of care are:      Numbness/tingling and Osteoarthritis.     Medications impacting care: None.  2) Examination of Body Systems comprised of:   Body structures and functions that impact the plan of care:      Lumbar spine.   Activity limitations that impact the plan of care are:      Bending, Driving, Lifting, Reading/Computer work and Sitting.  3) Clinical presentation characteristics are:   Stable/Uncomplicated.  4) Decision-Making    Low complexity using standardized patient assessment instrument and/or measureable assessment of functional outcome.  Cumulative Therapy Evaluation is: Low  complexity.    Previous and current functional limitations:  (See Goal Flow Sheet for this information)    Short term and Long term goals: (See Goal Flow Sheet for this information)     Communication ability:  Patient appears to be able to clearly communicate and understand verbal and written communication and follow directions correctly.  Treatment Explanation - The following has been discussed with the patient:   RX ordered/plan of care  Anticipated outcomes  Possible risks and side effects  This patient would benefit from PT intervention to resume normal activities.   Rehab potential is excellent.    Frequency:  1 X week, once daily  Duration:  for 2 weeks tapering to 2 X a month over 6 weeks  Discharge Plan:  Achieve all LTG.  Independent in home treatment program.  Reach maximal therapeutic benefit.    Please refer to the daily flowsheet for treatment today, total treatment time and time spent performing 1:1 timed codes.

## 2018-12-17 ENCOUNTER — THERAPY VISIT (OUTPATIENT)
Dept: PHYSICAL THERAPY | Facility: CLINIC | Age: 68
End: 2018-12-17
Payer: COMMERCIAL

## 2018-12-17 DIAGNOSIS — M54.16 LUMBAR RADICULOPATHY: ICD-10-CM

## 2018-12-17 PROCEDURE — 97112 NEUROMUSCULAR REEDUCATION: CPT | Mod: GP | Performed by: PHYSICAL THERAPIST

## 2018-12-17 PROCEDURE — 97110 THERAPEUTIC EXERCISES: CPT | Mod: GP | Performed by: PHYSICAL THERAPIST

## 2018-12-17 PROCEDURE — 97140 MANUAL THERAPY 1/> REGIONS: CPT | Mod: GP | Performed by: PHYSICAL THERAPIST

## 2018-12-27 ENCOUNTER — THERAPY VISIT (OUTPATIENT)
Dept: PHYSICAL THERAPY | Facility: CLINIC | Age: 68
End: 2018-12-27
Payer: COMMERCIAL

## 2018-12-27 DIAGNOSIS — M54.16 LUMBAR RADICULOPATHY: ICD-10-CM

## 2018-12-27 PROCEDURE — 97110 THERAPEUTIC EXERCISES: CPT | Mod: GP | Performed by: PHYSICAL THERAPIST

## 2018-12-27 PROCEDURE — 97112 NEUROMUSCULAR REEDUCATION: CPT | Mod: GP | Performed by: PHYSICAL THERAPIST

## 2019-01-08 ENCOUNTER — APPOINTMENT (OUTPATIENT)
Dept: CT IMAGING | Facility: CLINIC | Age: 69
End: 2019-01-08
Payer: COMMERCIAL

## 2019-01-08 ENCOUNTER — HOSPITAL ENCOUNTER (EMERGENCY)
Facility: CLINIC | Age: 69
Discharge: HOME | End: 2019-01-08
Attending: EMERGENCY MEDICINE | Admitting: EMERGENCY MEDICINE
Payer: COMMERCIAL

## 2019-01-08 VITALS
SYSTOLIC BLOOD PRESSURE: 135 MMHG | TEMPERATURE: 98.4 F | RESPIRATION RATE: 18 BRPM | OXYGEN SATURATION: 98 % | HEART RATE: 75 BPM | DIASTOLIC BLOOD PRESSURE: 71 MMHG

## 2019-01-08 DIAGNOSIS — R42 VERTIGO: ICD-10-CM

## 2019-01-08 LAB
ALBUMIN SERPL-MCNC: 3.8 G/DL (ref 3.4–5)
ALBUMIN UR-MCNC: NEGATIVE MG/DL
ALP SERPL-CCNC: 87 U/L (ref 40–150)
ALT SERPL W P-5'-P-CCNC: 28 U/L (ref 0–50)
ANION GAP SERPL CALCULATED.3IONS-SCNC: 7 MMOL/L (ref 3–14)
APPEARANCE UR: CLEAR
AST SERPL W P-5'-P-CCNC: 17 U/L (ref 0–45)
BASOPHILS # BLD AUTO: 0 10E9/L (ref 0–0.2)
BASOPHILS NFR BLD AUTO: 0.5 %
BILIRUB SERPL-MCNC: 0.3 MG/DL (ref 0.2–1.3)
BILIRUB UR QL STRIP: NEGATIVE
BUN SERPL-MCNC: 16 MG/DL (ref 7–30)
CALCIUM SERPL-MCNC: 8.5 MG/DL (ref 8.5–10.1)
CHLORIDE SERPL-SCNC: 106 MMOL/L (ref 94–109)
CO2 SERPL-SCNC: 27 MMOL/L (ref 20–32)
COLOR UR AUTO: ABNORMAL
CREAT SERPL-MCNC: 0.67 MG/DL (ref 0.52–1.04)
DIFFERENTIAL METHOD BLD: NORMAL
EOSINOPHIL # BLD AUTO: 0.2 10E9/L (ref 0–0.7)
EOSINOPHIL NFR BLD AUTO: 2.3 %
ERYTHROCYTE [DISTWIDTH] IN BLOOD BY AUTOMATED COUNT: 13.5 % (ref 10–15)
GFR SERPL CREATININE-BSD FRML MDRD: >90 ML/MIN/{1.73_M2}
GLUCOSE SERPL-MCNC: 94 MG/DL (ref 70–99)
GLUCOSE UR STRIP-MCNC: NEGATIVE MG/DL
HCT VFR BLD AUTO: 41.1 % (ref 35–47)
HGB BLD-MCNC: 13.3 G/DL (ref 11.7–15.7)
HGB UR QL STRIP: NEGATIVE
IMM GRANULOCYTES # BLD: 0 10E9/L (ref 0–0.4)
IMM GRANULOCYTES NFR BLD: 0.3 %
INTERPRETATION ECG - MUSE: NORMAL
KETONES UR STRIP-MCNC: NEGATIVE MG/DL
LEUKOCYTE ESTERASE UR QL STRIP: ABNORMAL
LYMPHOCYTES # BLD AUTO: 3.6 10E9/L (ref 0.8–5.3)
LYMPHOCYTES NFR BLD AUTO: 54.1 %
MCH RBC QN AUTO: 29.5 PG (ref 26.5–33)
MCHC RBC AUTO-ENTMCNC: 32.4 G/DL (ref 31.5–36.5)
MCV RBC AUTO: 91 FL (ref 78–100)
MONOCYTES # BLD AUTO: 0.5 10E9/L (ref 0–1.3)
MONOCYTES NFR BLD AUTO: 7.4 %
NEUTROPHILS # BLD AUTO: 2.3 10E9/L (ref 1.6–8.3)
NEUTROPHILS NFR BLD AUTO: 35.4 %
NITRATE UR QL: NEGATIVE
NRBC # BLD AUTO: 0 10*3/UL
NRBC BLD AUTO-RTO: 0 /100
PH UR STRIP: 6 PH (ref 5–7)
PLATELET # BLD AUTO: 231 10E9/L (ref 150–450)
POTASSIUM SERPL-SCNC: 4 MMOL/L (ref 3.4–5.3)
PROT SERPL-MCNC: 7.4 G/DL (ref 6.8–8.8)
RBC # BLD AUTO: 4.51 10E12/L (ref 3.8–5.2)
RBC #/AREA URNS AUTO: <1 /HPF (ref 0–2)
SODIUM SERPL-SCNC: 140 MMOL/L (ref 133–144)
SOURCE: ABNORMAL
SP GR UR STRIP: 1 (ref 1–1.03)
TROPONIN I SERPL-MCNC: <0.015 UG/L (ref 0–0.04)
UROBILINOGEN UR STRIP-MCNC: 0 MG/DL (ref 0–2)
WBC # BLD AUTO: 6.6 10E9/L (ref 4–11)
WBC #/AREA URNS AUTO: 1 /HPF (ref 0–5)

## 2019-01-08 PROCEDURE — 80053 COMPREHEN METABOLIC PANEL: CPT | Performed by: EMERGENCY MEDICINE

## 2019-01-08 PROCEDURE — 85025 COMPLETE CBC W/AUTO DIFF WBC: CPT | Performed by: EMERGENCY MEDICINE

## 2019-01-08 PROCEDURE — 93005 ELECTROCARDIOGRAM TRACING: CPT

## 2019-01-08 PROCEDURE — 99285 EMERGENCY DEPT VISIT HI MDM: CPT | Mod: 25

## 2019-01-08 PROCEDURE — 70450 CT HEAD/BRAIN W/O DYE: CPT

## 2019-01-08 PROCEDURE — 84484 ASSAY OF TROPONIN QUANT: CPT | Performed by: EMERGENCY MEDICINE

## 2019-01-08 PROCEDURE — 25000132 ZZH RX MED GY IP 250 OP 250 PS 637: Performed by: EMERGENCY MEDICINE

## 2019-01-08 PROCEDURE — 70498 CT ANGIOGRAPHY NECK: CPT

## 2019-01-08 PROCEDURE — 81001 URINALYSIS AUTO W/SCOPE: CPT | Performed by: EMERGENCY MEDICINE

## 2019-01-08 PROCEDURE — 25000128 H RX IP 250 OP 636: Performed by: EMERGENCY MEDICINE

## 2019-01-08 PROCEDURE — 96360 HYDRATION IV INFUSION INIT: CPT | Mod: 59

## 2019-01-08 RX ORDER — SODIUM CHLORIDE 9 MG/ML
1000 INJECTION, SOLUTION INTRAVENOUS CONTINUOUS
Status: DISCONTINUED | OUTPATIENT
Start: 2019-01-08 | End: 2019-01-08 | Stop reason: HOSPADM

## 2019-01-08 RX ORDER — MECLIZINE HYDROCHLORIDE 25 MG/1
25 TABLET ORAL 3 TIMES DAILY PRN
Qty: 30 TABLET | Refills: 0 | Status: SHIPPED | OUTPATIENT
Start: 2019-01-08 | End: 2019-01-18

## 2019-01-08 RX ORDER — IOPAMIDOL 755 MG/ML
500 INJECTION, SOLUTION INTRAVASCULAR ONCE
Status: COMPLETED | OUTPATIENT
Start: 2019-01-08 | End: 2019-01-08

## 2019-01-08 RX ORDER — ONDANSETRON 4 MG/1
4 TABLET, ORALLY DISINTEGRATING ORAL EVERY 8 HOURS PRN
Qty: 15 TABLET | Refills: 0 | Status: SHIPPED | OUTPATIENT
Start: 2019-01-08 | End: 2019-01-13

## 2019-01-08 RX ORDER — MECLIZINE HYDROCHLORIDE 25 MG/1
25 TABLET ORAL ONCE
Status: COMPLETED | OUTPATIENT
Start: 2019-01-08 | End: 2019-01-08

## 2019-01-08 RX ADMIN — MECLIZINE HYDROCHLORIDE 25 MG: 25 TABLET ORAL at 07:13

## 2019-01-08 RX ADMIN — SODIUM CHLORIDE 80 ML: 9 INJECTION, SOLUTION INTRAVENOUS at 06:16

## 2019-01-08 RX ADMIN — IOPAMIDOL 70 ML: 755 INJECTION, SOLUTION INTRAVENOUS at 06:11

## 2019-01-08 ASSESSMENT — ENCOUNTER SYMPTOMS
SHORTNESS OF BREATH: 0
HEADACHES: 1
NUMBNESS: 0
DIZZINESS: 1
NAUSEA: 1

## 2019-01-08 NOTE — ED PROVIDER NOTES
"  History     Chief Complaint:  Headache and dizziness    HPI   Lolis Villarreal is a 68 year old female who presents to the emergency department today for evaluation of several complaints. The patient reports that she has been experiencing dizziness as though the room is spinning with sitting and with change in position for the last week. She notes that yesterday she developed a \"throbbing\" headache to the back of her head that transitioned to a pressure last night. As her symptoms persisted accompanied by nausea and mild ear pain, she presents today for evaluation and treatment. Here the patient endorses similar symptoms occurring three years ago after hitting her head on a curb. She notes utilizing tylenol, last yesterday, for her symptoms. She denies any tingling, difficulty breathing, recent fall or trauma, or history of stroke. Of note, the patient had a crown drilled yesterday.   Patient does have a history of neck pain.  No numbness in the fingers or legs.   states no speech disturbances.    Allergies:  Dust Mites     Medications:    CoQ10    Past Medical History:    Lumbar radiculopathy  Squamous cell skin cancer  Obstructive sleep apnea  Chronic pain  Arthritis  Denies stroke    Past Surgical History:    Cervical spine surgery  Varicose vein removal right  Enhance laser refractive bilateral existing pt in parameters  Hysterectomy  Septoplasty, turbinoplasty  Tonsillectomy and adenoidectomy    Family History:    Mother: colorectal cancer, alzheimer disease, cerebrovascular disease, arthritis, tyroid  Father: heart disease, hypertension, brain/lung cancer  Sister: diabetes, sjogren's  Maternal Grandfather: diabetes  Paternal Grandfather: stomach cancer  Paternal Grandmother: arthritis  Son: rheumatoid arthritis  Maternal Aunt: macular degeneration    Social History:  The patient was accompanied to the ED by family.  Smoking Status: Never Smoker  Smokeless Tobacco: Never Used  Alcohol Use: " Negative  Drug Use: Negative  Marital Status:        Review of Systems   HENT: Positive for ear pain.    Respiratory: Negative for shortness of breath.    Gastrointestinal: Positive for nausea.   Neurological: Positive for dizziness and headaches. Negative for numbness.   All other systems reviewed and are negative.  Pt c/o headache, neck pain, dizziness for 1 week.  Patient played pickleball yesterday.    Physical Exam     Patient Vitals for the past 24 hrs:   BP Temp Temp src Pulse Resp SpO2   01/08/19 0430 156/79 98.4  F (36.9  C) Temporal 73 18 98 %     Physical Exam  GEN: patient smiling, no distress  HEAD: atraumatic, normocephalic  EYES: pupils reactive (3plus to 2plus), extraocular muscles intact, conjunctivae normal  ENT: TMs flat and white bilaterally, oropharynx normal with no erythema or exudate, mucus membranes moist, floor of mouth is soft, no intraoral lesions.  CNs intact.  Normal speech.  NECK: no cervical LAD, no JVD  RESPIRATORY: no tachypnea, breath sounds clear to auscultation (no rales, wheezes, rhonchi), chest wall nontender, normal phonation  CVS: normal S1/S2, I/VI systolic ej murmurs, no rubs/gallops  ABDOMEN: soft, nontender, no masses or organomegaly, no rebound, positive bowel sounds  BACK: no costovertebral angle tenderness  EXTREMITIES: intact pulses x 4, full range of motion at joints, no edema  MUSCULOSKELETAL: no deformities  SKIN: warm and dry, no acute rashes   NEURO: GCS 15, cranial nerves intact.  Motor- moves all 4 extremities with 5/5 strength, muscle bulk normal.  Sensation- intact all dermatones to pinprick and light touch.  Reflexes- DTRs 2plus.  Coordination- romberg negative, normal tandem gait, no ataxia.  Overall symmetrical exam.  Negative Gutierrez pike maneuver but symptoms worse on the left side   HEME: no bruising or petechiae/contusions  LYMPH: no lymphadenopathy    Emergency Department Course     ECG:  ECG taken at 0507, ECG read at 0507  Normal sinus  rhythm  Normal ECG  Rate 71 bpm. IA interval 160 ms. QRS duration 76 ms. QT/QTc 410/445 ms. P-R-T axes 71 44 40.   Normal axis.    Imaging:  Radiology findings were communicated with the patient who voiced understanding of the findings.    CT Head W/O Contrast:   1. Normal for age.   2. No CT finding of a mass, infarct, or hemorrhage.  Reading per radiology    Head and Neck CT Angiogram with IV Contrast:   1. Normal intracranial circulation  2. No hemodynamically significant narrowing throughout major neck vessels.  Reading per radiology    Laboratory:  Laboratory findings were communicated with the patient who voiced understanding of the findings.    UA with Microscopic: Leukocyte Esterase small (A), o/w WNL  CBC: WBC 6.6, HGB 13.3,   CMP: WNL (Creatinine 0.67)  Troponin (Collected: 0445): <0.015    Interventions:   Antivert 25 mg Oral  Heplock  Cardiac/Sp02 monitoring  500cc NS bolus IV    Emergency Department Course:    0435 Nursing notes and vitals reviewed.    0442 I performed an exam of the patient as documented above.     0445 IV was inserted and blood was drawn for laboratory testing, results above.    0544 The patient provided a urine sample here in the emergency department. This was sent for laboratory testing, findings above.    0558 The patient was sent for a CT/CTA while in the emergency department, results above.     I personally reviewed the laboratory and imaging results with the patient and answered all related questions prior to discharge.    /71   Pulse 75   Temp 98.4  F (36.9  C) (Temporal)   Resp 18   SpO2 98%   Discussed results with patient.  Gave patient copies of all results (applicable labs, CT scans and/or ultrasounds).  Answered questions.  Asked patient to followup with PCP.  Circled any abnormal lab values and asked patient to followup with PCP.    Impression & Plan      Medical Decision Making:  Lolis Villarreal is a 68 year old female who has had dizziness with  certain positions over the last few days accompanied by headaches. No difficulty swallowing, fever, chills, chest pain, or shortness of breath.   Normal neurological exam.  NIH stroke scale zero.  Symptoms worse on the left side but no obvious nystagmus noted.    The patient had an EKG performed which did not demonstrate STEMI and was otherwise normal. An IV was placed and labs were sent. She was given meclizine and her symptoms have somewhat resolved. She does have symptoms also when she rolls on the left side with ?mild  nystagmus. This appears to be peripheral vertigo. She has not recently had any cough and cold symptoms.  IV placed and meclizine given orally.    EKG with no STEMI.     CTA and CT of the head and neck was otherwise normal. Urine does not show any infection. CBC, chemistry panel, and troponin are otherwise normal. The patient will go home with referral as an outpatient for vertigo. She understands the reasons to return.     CT of head and neck is normal.  Patient to followup.  Vestibular rehab consult placed in the computer.  Rx for meclizine.    Diagnosis:    ICD-10-CM    1. Vertigo R42 PHYSICAL THERAPY REFERRAL   2. Headache    Disposition:   The patient is discharged to home.    Discharge Medications:     Review of your medicines      UNREVIEWED medicines. Ask your doctor about these medicines      Dose / Directions   cetirizine 10 MG tablet  Commonly known as:  zyrTEC      Dose:  10 mg  Take 1 tablet (10 mg) by mouth every evening  Quantity:  30 tablet  Refills:  1     CINNAMON PO      Dose:  1200 mg  Take 1,200 mg by mouth 2 times daily  Refills:  0     CoQ10 50 MG Caps      Refills:  0     Krill Oil 1000 MG Caps      Refills:  0     Misc Intestinal Lesa Regulat Tabs  Used for:  Routine general medical examination at a health care facility      Take  by mouth.  Refills:  0     Multi-vitamin tablet      Dose:  1 tablet  Take 1 tablet by mouth daily  Refills:  0     Turmeric 500 MG Caps       Refills:  0     UNABLE TO FIND      MEDICATION NAME: Cognifen  Refills:  0     vitamin B complex with vitamin C tablet      Dose:  1 tablet  Take 1 tablet by mouth daily  Refills:  0     Vitamin D2 (Ergocalciferol) 2000 units Caps      Refills:  0        START taking      Dose / Directions   meclizine 25 MG tablet  Commonly known as:  ANTIVERT      Dose:  25 mg  Take 1 tablet (25 mg) by mouth 3 times daily as needed for dizziness  Quantity:  30 tablet  Refills:  0     ondansetron 4 MG ODT tab  Commonly known as:  ZOFRAN ODT      Dose:  4 mg  Take 1 tablet (4 mg) by mouth every 8 hours as needed for nausea  Quantity:  15 tablet  Refills:  0        CONTINUE these medicines which have NOT CHANGED      Dose / Directions   order for DME      Uses C-pap.  Refills:  0           Where to get your medicines      Some of these will need a paper prescription and others can be bought over the counter. Ask your nurse if you have questions.    Bring a paper prescription for each of these medications    meclizine 25 MG tablet    ondansetron 4 MG ODT tab       Scribe Disclosure:  Zenaida HANLEY, am serving as a scribe at 4:37 AM on 1/8/2019 to document services personally performed by Margaret Zarate MD based on my observations and the provider's statements to me.    [facility] EMERGENCY DEPARTMENT       Margaret Zarate MD  01/09/19 7733

## 2019-01-08 NOTE — ED TRIAGE NOTES
Pt c/o   1. Headache  2. Neck pain  3. Dizziness    For 1 week    Pt A&O x 3, CMS x 3, ABCD's adequate in triage

## 2019-01-08 NOTE — DISCHARGE INSTRUCTIONS
Meclizine as needed.    Hydrate and push fluids.    Zofran for nausea (if vertigo becomes severe).

## 2019-01-08 NOTE — ED AVS SNAPSHOT
[facility] Emergency Department  [address]  [facility] [address]  Phone:  [phone]  Fax:  [phone]                                    Lolis Villarreal   MRN: [id number]    Department:  [facility] Emergency Department   Date of Visit:  1/8/2019           After Visit Summary Signature Page    I have received my discharge instructions, and my questions have been answered. I have discussed any challenges I see with this plan with the nurse or doctor.    ..........................................................................................................................................  Patient/Patient Representative Signature      ..........................................................................................................................................  Patient Representative Print Name and Relationship to Patient    ..................................................               ................................................  Date                                   Time    ..........................................................................................................................................  Reviewed by Signature/Title    ...................................................              ..............................................  Date                                               Time          22EPIC Rev 08/18

## 2019-01-16 ENCOUNTER — HOSPITAL ENCOUNTER (OUTPATIENT)
Dept: PHYSICAL THERAPY | Facility: CLINIC | Age: 69
End: 2019-01-16
Payer: COMMERCIAL

## 2019-01-16 DIAGNOSIS — R42 VERTIGO: ICD-10-CM

## 2019-01-16 DIAGNOSIS — R42 DIZZINESS: Primary | ICD-10-CM

## 2019-01-16 PROCEDURE — 95992 CANALITH REPOSITIONING PROC: CPT | Mod: GP | Performed by: PHYSICAL THERAPIST

## 2019-01-16 PROCEDURE — 97162 PT EVAL MOD COMPLEX 30 MIN: CPT | Mod: GP | Performed by: PHYSICAL THERAPIST

## 2019-01-16 PROCEDURE — 97112 NEUROMUSCULAR REEDUCATION: CPT | Mod: GP | Performed by: PHYSICAL THERAPIST

## 2019-01-17 ENCOUNTER — TRANSFERRED RECORDS (OUTPATIENT)
Dept: HEALTH INFORMATION MANAGEMENT | Facility: CLINIC | Age: 69
End: 2019-01-17

## 2019-01-20 NOTE — PROGRESS NOTES
" 01/16/19 1000   Quick Adds   Quick Adds Vestibular Eval   Type of Visit Initial OP PT Evaluation   General Information   Start of Care Date 01/16/19   Referring Physician Margaret Zarate MD   Orders Evaluate and Treat as Indicated   Medical Diagnosis Vertigo   Onset of illness/injury or Date of Surgery 01/08/19   Pertinent history of current problem (include personal factors and/or comorbidities that impact the POC) 1/8/19- visit to ED. Room spinngin dizziness with positional changes. Symptoms occur when moving one side to the other and when transitioning supine>sitting. SYmptom have been steadily improving. Have returned to driving and pickle ball with minimal difficulty, but \"definitely not at my normal. Feeling a little off with pickle ball. Vision- blurry at times, feel a fog over my eyes. No changes with hearing, but R ear does feel plugged. No recent illness, but did have sinus surgery last spring.  Feeling a little off balance and off kilter at times.  PMH: Hands arthritis. CT Imaging of neck and head returned normal. ECG normal.    Living environment [place]   Assistive Devices Comments Is not utilizing an AD   Patient/Family Goals Statement Resolve dizziness   Fall Risk Screen   Fall screen completed by PT   Have you fallen 2 or more times in the past year? No   Have you fallen and had an injury in the past year? No   Is patient a fall risk? Yes   Fall screen comments Patient is inherently at elevated risk of falling due to dizziness symptoms.   System Outcome Measures   Outcome Measures BPPV   Dizziness Handicap Inventory (score out of 100) A decrease in score by 17.18 or greater indicates a clinically significant change in symptoms. 44   Pain   Pain comments Patient reproting neck stiffness. History of cervical fusion, uncertain at what levels.    Cognitive Status Examination   Orientation orientation to person, place and time   Level of Consciousness alert   Follows Commands and Answers " Questions 100% of the time   Personal Safety and Judgment intact   Memory intact   Posture   Posture Forward head position;Protracted shoulders   Range of Motion (ROM)   ROM Comment B LEs functioanlly assessed throug general mobility screening and found to be within normal limits.   Strength   Strength Comments B LEs functionally assessed throug general mobility screening and found to be within normal limtis and globally graded at least 3/5 as patient is able to lift bilateral extremties agaisnt gravity.   Bed Mobility   Bed Mobility Comments IND   Transfer Skills   Transfer Comments IND   Gait   Gait Comments IND with normalized mechanics.    Balance   Balance Comments Limitations noted in postural stability with elevated symptoms, however, patietn able to self correct without difficulty.   Sensory Examination   Sensory Perception no deficits were identified   Coordination   Coordination no deficits were identified   Muscle Tone   Muscle Tone no deficits were identified   Cervicogenic Screen   Neck ROM GLobally restricted with patient reproting stiffness with all mobility. ROM is approrpiate for vestibualr testing.    Vertebral Artery Test Normal   Oculomotor Exam   Smooth Pursuit Normal   Saccades Normal   VOR Normal   Rapid Head Thrust Corrective Saccade L head thrust   Infrared Goggle Exam or Frenzel Lense Exam   Vestibular Suppressant in Last 24 Hours? Yes   Exam completed with Infrared Goggles   Spontaneous Nystagmus Negative   Gaze Evoked Nystagmus Negative   Jennifer-Hallpike (right) Upbeating R torsional   Jennifer-Hallpike (right) comments Large amplitude nystagmus lasting approxiamtely 5 seconds, delayed onset. Symptom coinciding with bserved nystagmus.   Jennifer-Hallpike (Left) Negative   BPPV Canal(s) R Posterior   BPPV Type Canalithasis   Planned Therapy Interventions   Planned Therapy Interventions balance training;neuromuscular re-education;ROM;manual therapy;other (see comments)  (Vestibular rehab)   Clinical  Impression   Criteria for Skilled Therapeutic Interventions Met yes, treatment indicated   PT Diagnosis Decreased safe functional mobility   Influenced by the following impairments Dizziness symptomg   Functional limitations due to impairments Decreased safe functional mobility   Clinical Presentation Evolving/Changing   Clinical Decision Making (Complexity) Moderate complexity   Therapy Frequency 1 time/week   Predicted Duration of Therapy Intervention (days/wks) 4 weeks   Risk & Benefits of therapy have been explained Yes   Patient, Family & other staff in agreement with plan of care Yes   GOALS   PT Eval Goals 1;2   Goal 1   Goal Identifier DHI   Goal Description The patient will report a score of 26/100 or less on DHI assessment to demonstrate a significant improvement in dizziness symptom severity.   Target Date 02/28/19   Goal 2   Goal Identifier Positional testing   Goal Description The patient will be able to complete B russell pike and roll tests without observed nystagmus and without conplaints of dizziness to demonstrate resolution of BPPV.   Target Date 02/28/19   Total Evaluation Time   PT Neal, Moderate Complexity Minutes ([id number]) 35

## 2019-01-22 ENCOUNTER — HOSPITAL ENCOUNTER (OUTPATIENT)
Dept: PHYSICAL THERAPY | Facility: CLINIC | Age: 69
End: 2019-01-22
Payer: COMMERCIAL

## 2019-01-22 DIAGNOSIS — R42 DIZZINESS: Primary | ICD-10-CM

## 2019-01-22 DIAGNOSIS — R42 VERTIGO: ICD-10-CM

## 2019-01-22 PROCEDURE — 97112 NEUROMUSCULAR REEDUCATION: CPT | Mod: GP | Performed by: PHYSICAL THERAPIST

## 2019-02-08 ENCOUNTER — HOSPITAL ENCOUNTER (OUTPATIENT)
Dept: PHYSICAL THERAPY | Facility: CLINIC | Age: 69
End: 2019-02-08
Payer: COMMERCIAL

## 2019-02-08 DIAGNOSIS — R42 VERTIGO: ICD-10-CM

## 2019-02-08 DIAGNOSIS — R42 DIZZINESS: Primary | ICD-10-CM

## 2019-02-08 PROCEDURE — 97112 NEUROMUSCULAR REEDUCATION: CPT | Mod: GP | Performed by: PHYSICAL THERAPIST

## 2019-02-12 ENCOUNTER — HOSPITAL ENCOUNTER (OUTPATIENT)
Dept: PHYSICAL THERAPY | Facility: CLINIC | Age: 69
End: 2019-02-12
Payer: COMMERCIAL

## 2019-02-12 DIAGNOSIS — R42 VERTIGO: ICD-10-CM

## 2019-02-12 DIAGNOSIS — R42 DIZZINESS: Primary | ICD-10-CM

## 2019-02-12 PROCEDURE — 97112 NEUROMUSCULAR REEDUCATION: CPT | Mod: GP | Performed by: PHYSICAL THERAPIST

## 2019-02-12 NOTE — PROGRESS NOTES
Outpatient Physical Therapy Discharge Note     Patient: Lolis Villarreal  : 1950    Beginning/End Dates of Reporting Period:  19 to 2019    Referring Provider: Margaret Zarate MD    Therapy Diagnosis: Decreased safe functional mobility     Client Self Report: Patient present and reporting that things have been going well. Has been working on exercises, but haven't experienced any dizziness or vision changes for last several days. Have returned to normal activity, including pickle ball without difficulty.      Objective Measurements:  Objective Measure: DHI  Details: 0/100           Outcome Measures (most recent score):   See DHI scoring below         Goals:  Goal Identifier MET: DHI   Goal Description The patient will report a score of 26/100 or less on DHI assessment to demonstrate a significant improvement in dizziness symptom severity.   Target Date 19   Date Met  19   Progress:     Goal Identifier MET: Positional testing   Goal Description The patient will be able to complete B russell pike and roll tests without observed nystagmus and without conplaints of dizziness to demonstrate resolution of BPPV.   Target Date 19   Date Met  19   Progress:     Goal Identifier     Goal Description     Target Date     Date Met      Progress:     Goal Identifier     Goal Description     Target Date     Date Met      Progress:     Goal Identifier     Goal Description     Target Date     Date Met      Progress:     Goal Identifier     Goal Description     Target Date     Date Met      Progress:     Goal Identifier     Goal Description     Target Date     Date Met      Progress:     Goal Identifier     Goal Description     Target Date     Date Met      Progress:     Progress Toward Goals:   Progress this reporting period: Goals Met    Plan:  Discharge from therapy.    Discharge:    Reason for Discharge: Patient has met all goals.    Equipment Issued: None    Discharge Plan: Patient to  continue home program as needed (Self epley)

## 2019-03-04 ENCOUNTER — OFFICE VISIT (OUTPATIENT)
Dept: OPTOMETRY | Facility: CLINIC | Age: 69
End: 2019-03-04
Payer: COMMERCIAL

## 2019-03-04 DIAGNOSIS — H52.202 MYOPIA OF LEFT EYE WITH ASTIGMATISM: Primary | ICD-10-CM

## 2019-03-04 DIAGNOSIS — H52.12 MYOPIA OF LEFT EYE WITH ASTIGMATISM: Primary | ICD-10-CM

## 2019-03-04 PROCEDURE — 92015 DETERMINE REFRACTIVE STATE: CPT | Performed by: OPTOMETRIST

## 2019-03-04 PROCEDURE — 92014 COMPRE OPH EXAM EST PT 1/>: CPT | Performed by: OPTOMETRIST

## 2019-03-04 ASSESSMENT — REFRACTION_MANIFEST
OS_CYLINDER: +1.00
OS_AXIS: 007
OS_AXIS: 165
OD_SPHERE: -0.25
OS_SPHERE: -2.25
OD_SPHERE: PLANO
OS_CYLINDER: +1.50
OD_AXIS: 021
METHOD_AUTOREFRACTION: 1
OS_SPHERE: -2.75
OD_CYLINDER: SPHERE
OD_CYLINDER: +0.25

## 2019-03-04 ASSESSMENT — CUP TO DISC RATIO
OS_RATIO: 0.5
OD_RATIO: 0.5

## 2019-03-04 ASSESSMENT — CONF VISUAL FIELD
OD_NORMAL: 1
METHOD: COUNTING FINGERS
OS_NORMAL: 1

## 2019-03-04 ASSESSMENT — VISUAL ACUITY
METHOD: SNELLEN - LINEAR
OS_PH_SC: 20/20
OD_SC: 20/20
OS_SC: 20/60
OD_SC: 20/40
OD_SC+: -1
OS_SC: 20/30

## 2019-03-04 ASSESSMENT — TONOMETRY
IOP_METHOD: APPLANATION
OS_IOP_MMHG: 19
OD_IOP_MMHG: 19

## 2019-03-04 ASSESSMENT — EXTERNAL EXAM - LEFT EYE: OS_EXAM: NORMAL

## 2019-03-04 ASSESSMENT — EXTERNAL EXAM - RIGHT EYE: OD_EXAM: NORMAL

## 2019-03-04 NOTE — PROGRESS NOTES
Chief Complaint   Patient presents with     Annual Eye Exam      Had mono lasik done 16 years ago  Has distance glasses for left eye and does not wear   Did not fill glasses rx from last year    Got hit in the left eye last week by a pickleball    Last Eye Exam: 2018 Dr. Hoff  Dilated Previously: Yes    What are you currently using to see?  does not use glasses or contacts       Distance Vision Acuity: Satisfied with vision    Near Vision Acuity: Satisfied with vision while reading  unaided    Eye Comfort: watery/ no stinging, burning or itching  Do you use eye drops? : No  Occupation or Hobbies: Retired Teacher    Sarah Parker, Optometric Assistant          Medical, surgical and family histories reviewed and updated 3/4/2019.       OBJECTIVE: See Ophthalmology exam    ASSESSMENT:    ICD-10-CM    1. Myopia of left eye with astigmatism H52.12     H52.202       PLAN:     Optional artificial tears  As needed     Lupe Hoff OD

## 2019-03-04 NOTE — PATIENT INSTRUCTIONS
I recommend using artificial tears for your dry eye. There are over the counter drops that work well and may be used up to 4 x daily. ( systane balance , refresh optive, retaine, or soothe xp ) If you need more than 4 drops daily, use a preservative free product which come in individual vials and may be used for 24 hours until finished and discarded.

## 2019-03-04 NOTE — LETTER
3/4/2019         RE: Lolis Villarreal  [address]  [place] [address]        Dear Colleague,    Thank you for referring your patient, Lolis Villarreal, to the [facility] [place]. Please see a copy of my visit note below.    Chief Complaint   Patient presents with     Annual Eye Exam      Had mono lasik done 16 years ago  Has distance glasses for left eye and does not wear   Did not fill glasses rx from last year    Got hit in the left eye last week by a pickleball    Last Eye Exam: 2018 Dr. Hoff  Dilated Previously: Yes    What are you currently using to see?  does not use glasses or contacts       Distance Vision Acuity: Satisfied with vision    Near Vision Acuity: Satisfied with vision while reading  unaided    Eye Comfort: watery/ no stinging, burning or itching  Do you use eye drops? : No  Occupation or Hobbies: Retired Teacher    Sarah Parker, Optometric Assistant          Medical, surgical and family histories reviewed and updated 3/4/2019.       OBJECTIVE: See Ophthalmology exam    ASSESSMENT:    ICD-10-CM    1. Myopia of left eye with astigmatism H52.12     H52.202       PLAN:     Optional artificial tears  As needed     Lupe Hoff OD     Again, thank you for allowing me to participate in the care of your patient.        Sincerely,        Lupe Hoff, OD

## 2019-03-11 PROBLEM — M54.16 LUMBAR RADICULOPATHY: Status: RESOLVED | Noted: 2018-12-17 | Resolved: 2019-03-11

## 2019-03-11 NOTE — PROGRESS NOTES
Discharge Note    Progress reporting period is from initial evaluation date (please see noted date below) to Dec 27, 2018.  Linked Episodes   Type: Episode: Status: Noted: Resolved: Last update: Updated by:   PHYSICAL THERAPY low back pain /left sciatica12-10-18 Active 12/10/2018  12/27/2018  1:58 PM Pietro Zuñiga, PT      Comments:       Lolis failed to follow up and current status is unknown.  Please see information below for last relevant information on current status.  Patient seen for 3 visits.    SUBJECTIVE  Subjective changes noted by patient:  Patient reports that she is doing well.  Mostly pain free.  Stretching very frequently.   .  Current pain level is 0/10.     Previous pain level was  6/10.   Changes in function:  Yes (See Goal flowsheet attached for changes in current functional level)  Adverse reaction to treatment or activity: None    OBJECTIVE  Changes noted in objective findings: Glute medius 4+/5, glute max 4+/5.  Lumbar ROM WNL.      ASSESSMENT/PLAN  Diagnosis: low back pain with left radiculopathy   Updated problem list and treatment plan:   Pain - HEP  Decreased ROM/flexibility - HEP  Decreased function - HEP  Decreased strength - HEP  STG/LTGs have been met or progress has been made towards goals:  Yes, please see goal flowsheet for most current information  Assessment of Progress: current status is unknown.    Last current status: Pt is progressing well   Self Management Plans:  HEP  I have re-evaluated this patient and find that the nature, scope, duration and intensity of the therapy is appropriate for the medical condition of the patient.  Lolis continues to require the following intervention to meet STG and LTG's:  HEP.    Recommendations:  Discharge with current home program.  Patient to follow up with MD as needed.    Please refer to the daily flowsheet for treatment today, total treatment time and time spent performing 1:1 timed codes.

## 2019-09-10 ENCOUNTER — OFFICE VISIT (OUTPATIENT)
Dept: VASCULAR SURGERY | Facility: CLINIC | Age: 69
End: 2019-09-10
Payer: COMMERCIAL

## 2019-09-10 DIAGNOSIS — Z53.9 ERRONEOUS ENCOUNTER--DISREGARD: Primary | ICD-10-CM

## 2019-09-10 PROCEDURE — 99213 OFFICE O/P EST LOW 20 MIN: CPT | Performed by: SURGERY

## 2019-09-12 ENCOUNTER — MYC MEDICAL ADVICE (OUTPATIENT)
Dept: SLEEP MEDICINE | Facility: CLINIC | Age: 69
End: 2019-09-12

## 2019-09-12 NOTE — PROGRESS NOTES
[facility] Office Note    Lolis Villarreal returns in follow-up of bilateral lower extremity pain and varicose veins.  I saw her last on 1/10/2017.  She has pursued conservative measures since that time.  She underwent endovenous laser ablation of the right great saphenous vein on 4/19/2010.    She has been wearing compression, exercising to control her weight and elevating her legs when possible.  The symptoms do not seem to be significantly interfering with actives of daily living.  She has suffered no complications since she was last seen.    Physical exam  General: Pleasant female in no acute distress.  Extremities: Right lower extremity: She has a few scattered telangiectasias and some reticular veins but no significant varicose veins are appreciated.  Left lower extremity: Scattered telangiectasias with a small varicose veins on her left medial calf.    Past ultrasound has revealed limited incompetence of her right great saphenous vein at the mid thigh level but the vein was quite small.  The right small saphenous vein, left great saphenous vein and left small saphenous veins were competent on her last study.    Impression  Slowly progressive, recurrent venous insufficiency which does not appear to be lifestyle limiting at this time.  I have encouraged continued conservative management with compression, leg elevation, dietary measures and exercise.  She will return on an as-needed basis.  I feel that on her next return, we should perform bilateral lower extremity venous duplex ultrasound.    FRANCO Thomas MD    Please send a copy of dictation to Sidra Bernardo

## 2019-09-15 ENCOUNTER — OFFICE VISIT (OUTPATIENT)
Dept: URGENT CARE | Facility: URGENT CARE | Age: 69
End: 2019-09-15
Payer: COMMERCIAL

## 2019-09-15 VITALS
SYSTOLIC BLOOD PRESSURE: 138 MMHG | HEART RATE: 101 BPM | DIASTOLIC BLOOD PRESSURE: 68 MMHG | TEMPERATURE: 98.5 F | WEIGHT: 166.1 LBS | BODY MASS INDEX: 30.14 KG/M2 | OXYGEN SATURATION: 98 %

## 2019-09-15 DIAGNOSIS — L03.113 CELLULITIS OF HAND, RIGHT: ICD-10-CM

## 2019-09-15 DIAGNOSIS — L03.114 CELLULITIS OF HAND, LEFT: ICD-10-CM

## 2019-09-15 DIAGNOSIS — L03.114 CELLULITIS OF LEFT UPPER ARM: ICD-10-CM

## 2019-09-15 DIAGNOSIS — L03.011 CELLULITIS OF FINGER OF RIGHT HAND: ICD-10-CM

## 2019-09-15 DIAGNOSIS — T63.441A BEE STING REACTION, ACCIDENTAL OR UNINTENTIONAL, INITIAL ENCOUNTER: Primary | ICD-10-CM

## 2019-09-15 PROCEDURE — 99213 OFFICE O/P EST LOW 20 MIN: CPT | Performed by: FAMILY MEDICINE

## 2019-09-15 RX ORDER — CEPHALEXIN 500 MG/1
500 CAPSULE ORAL 3 TIMES DAILY
Qty: 21 CAPSULE | Refills: 0 | Status: SHIPPED | OUTPATIENT
Start: 2019-09-15 | End: 2019-10-21

## 2019-09-15 RX ORDER — PREDNISONE 20 MG/1
TABLET ORAL
Qty: 21 TABLET | Refills: 0 | Status: SHIPPED | OUTPATIENT
Start: 2019-09-15 | End: 2019-10-21

## 2019-09-15 NOTE — PROGRESS NOTES
SUBJECTIVE:   Lolis Villarreal is a 69 year old female presenting with a chief complaint of multiple bee stings on the hands (dorsal hands), right index finger, left distal forearm, and the left shoulder.  Onset of symptoms was last night.    Course of illness is worsening..    Current and Associated symptoms: spreading redness, itching, warmth, swelling.    Treatment measures tried include Benadryl (PO), Benadryl cream, Ice. .    Patient had been stung by two bees last week.  .  .    Past Medical History:   Diagnosis Date     Arthritis      Chronic pain     sciatic pain left leg to toes     Numbness and tingling     left leg     PONV (postoperative nausea and vomiting)     hx nausea postop     Current Outpatient Medications   Medication Sig Dispense Refill     cetirizine (ZYRTEC) 10 MG tablet Take 1 tablet (10 mg) by mouth every evening 30 tablet 1     CINNAMON PO Take 1,200 mg by mouth 2 times daily       Coenzyme Q10 (COQ10) 50 MG CAPS        Krill Oil 1000 MG CAPS        multivitamin, therapeutic with minerals (MULTI-VITAMIN) TABS Take 1 tablet by mouth daily       order for DME Uses C-pap.       Probiotic Product (MISC INTESTINAL COLE REGULAT) TABS Take  by mouth.       Turmeric 500 MG CAPS        UNABLE TO FIND MEDICATION NAME: Cognifen       vitamin B complex with vitamin C (VITAMIN  B COMPLEX) TABS tablet Take 1 tablet by mouth daily       Vitamin D, Ergocalciferol, 2000 units CAPS        Social History     Tobacco Use     Smoking status: Never Smoker     Smokeless tobacco: Never Used   Substance Use Topics     Alcohol use: No     Alcohol/week: 0.0 oz       ROS:  CONSTITUTIONAL:NEGATIVE  for fevers.   INTEGUMENTARY/SKIN: POSITIVE for swollen red lesions at the areas of the insect bites.    ear nose throat:  No abnormal throat sensations.    RESP: negative for shortness of breath/cough/wheezing.      OBJECTIVE:  /68   Pulse 101   Temp 98.5  F (36.9  C) (Tympanic)   Wt 75.3 kg (166 lb 1.6 oz)    SpO2 98%   BMI 30.14 kg/m    GENERAL APPEARANCE: healthy, alert and no distress.  No acute respiratory distress.    SKIN: the dorsal hands, the left shoulder and the left anterior distal forearm have confluent erythema, edema, warmth, tenderness.  There is a red streak spreading proximally from the confluent erythematous region on the distal anterior right forearm.     ASSESSMENT:  Bee Sting reaction  Cellulitis    PLAN:  Rx:  Prednisone, Cephalexin    Continue placing ice onto the swollen areas for 10-15 minutes at a time, every 2 hours while awake until the swelling goes down.      Go to the emergency room if you develop fevers or if you develop severe shortness of breath, wheezing or if your fingers are turning blue/purple.      follow up if the redness continues to spread despite 48 hours of the Cephalexin treatment.      Elevate the arms above the level of the heart to decrease some of the swelling.      Kaushik Saul MD

## 2019-09-15 NOTE — PATIENT INSTRUCTIONS
Continue placing ice onto the swollen areas for 10-15 minutes at a time, every 2 hours while awake until the swelling goes down.      Go to the emergency room if you develop fevers or if you develop severe shortness of breath, wheezing or if your fingers are turning blue/purple.      follow up if the redness continues to spread despite 48 hours of the Cephalexin treatment.      Elevate the arms above the level of the heart to decrease some of the swelling.

## 2019-10-21 ENCOUNTER — OFFICE VISIT (OUTPATIENT)
Dept: PEDIATRICS | Facility: CLINIC | Age: 69
End: 2019-10-21
Payer: COMMERCIAL

## 2019-10-21 VITALS
HEIGHT: 63 IN | TEMPERATURE: 98.3 F | OXYGEN SATURATION: 97 % | BODY MASS INDEX: 28.08 KG/M2 | HEART RATE: 75 BPM | SYSTOLIC BLOOD PRESSURE: 130 MMHG | DIASTOLIC BLOOD PRESSURE: 64 MMHG | WEIGHT: 158.5 LBS

## 2019-10-21 DIAGNOSIS — Z13.220 SCREENING CHOLESTEROL LEVEL: ICD-10-CM

## 2019-10-21 DIAGNOSIS — Z23 NEEDS FLU SHOT: ICD-10-CM

## 2019-10-21 DIAGNOSIS — Z00.00 ENCOUNTER FOR MEDICARE ANNUAL WELLNESS EXAM: Primary | ICD-10-CM

## 2019-10-21 DIAGNOSIS — Z13.1 SCREENING FOR DIABETES MELLITUS: ICD-10-CM

## 2019-10-21 DIAGNOSIS — Z12.31 VISIT FOR SCREENING MAMMOGRAM: ICD-10-CM

## 2019-10-21 LAB
ALBUMIN SERPL-MCNC: 4.1 G/DL (ref 3.4–5)
ALP SERPL-CCNC: 70 U/L (ref 40–150)
ALT SERPL W P-5'-P-CCNC: 23 U/L (ref 0–50)
ANION GAP SERPL CALCULATED.3IONS-SCNC: 8 MMOL/L (ref 3–14)
AST SERPL W P-5'-P-CCNC: 11 U/L (ref 0–45)
BILIRUB SERPL-MCNC: 0.3 MG/DL (ref 0.2–1.3)
BUN SERPL-MCNC: 18 MG/DL (ref 7–30)
CALCIUM SERPL-MCNC: 9.1 MG/DL (ref 8.5–10.1)
CHLORIDE SERPL-SCNC: 107 MMOL/L (ref 94–109)
CHOLEST SERPL-MCNC: 197 MG/DL
CO2 SERPL-SCNC: 25 MMOL/L (ref 20–32)
CREAT SERPL-MCNC: 0.71 MG/DL (ref 0.52–1.04)
GFR SERPL CREATININE-BSD FRML MDRD: 87 ML/MIN/{1.73_M2}
GLUCOSE SERPL-MCNC: 99 MG/DL (ref 70–99)
HDLC SERPL-MCNC: 59 MG/DL
LDLC SERPL CALC-MCNC: 118 MG/DL
NONHDLC SERPL-MCNC: 138 MG/DL
POTASSIUM SERPL-SCNC: 4 MMOL/L (ref 3.4–5.3)
PROT SERPL-MCNC: 7 G/DL (ref 6.8–8.8)
SODIUM SERPL-SCNC: 140 MMOL/L (ref 133–144)
TRIGL SERPL-MCNC: 100 MG/DL

## 2019-10-21 PROCEDURE — 99397 PER PM REEVAL EST PAT 65+ YR: CPT | Mod: 25 | Performed by: INTERNAL MEDICINE

## 2019-10-21 PROCEDURE — 90662 IIV NO PRSV INCREASED AG IM: CPT | Performed by: INTERNAL MEDICINE

## 2019-10-21 PROCEDURE — 80053 COMPREHEN METABOLIC PANEL: CPT | Performed by: INTERNAL MEDICINE

## 2019-10-21 PROCEDURE — 77067 SCR MAMMO BI INCL CAD: CPT | Mod: TC

## 2019-10-21 PROCEDURE — 90471 IMMUNIZATION ADMIN: CPT | Performed by: INTERNAL MEDICINE

## 2019-10-21 PROCEDURE — 36415 COLL VENOUS BLD VENIPUNCTURE: CPT | Performed by: INTERNAL MEDICINE

## 2019-10-21 PROCEDURE — 80061 LIPID PANEL: CPT | Performed by: INTERNAL MEDICINE

## 2019-10-21 PROCEDURE — 77063 BREAST TOMOSYNTHESIS BI: CPT | Mod: TC

## 2019-10-21 ASSESSMENT — ENCOUNTER SYMPTOMS
HEMATURIA: 0
CONSTIPATION: 0
EYE PAIN: 0
HEMATOCHEZIA: 0
DIZZINESS: 0
HEADACHES: 0
PARESTHESIAS: 0
DIARRHEA: 0
SHORTNESS OF BREATH: 0
PALPITATIONS: 0
ABDOMINAL PAIN: 0
WEAKNESS: 0
FREQUENCY: 0
COUGH: 0
FEVER: 0
CHILLS: 0
SORE THROAT: 0
HEARTBURN: 0
ARTHRALGIAS: 1
NAUSEA: 0
MYALGIAS: 0
JOINT SWELLING: 1
NERVOUS/ANXIOUS: 0
DYSURIA: 0
BREAST MASS: 0

## 2019-10-21 ASSESSMENT — ACTIVITIES OF DAILY LIVING (ADL): CURRENT_FUNCTION: NO ASSISTANCE NEEDED

## 2019-10-21 ASSESSMENT — MIFFLIN-ST. JEOR: SCORE: 1210.14

## 2019-10-21 NOTE — PATIENT INSTRUCTIONS
1. Consider stopping B complex vitamin, cinnamon, co-enzyme Q10,   2. Labs today: blood sugar, liver function, kidney function and cholesterol.  3. Will need colonoscopy next year and bone density scan in 2021  4. Ear wash today    I will be changing clinics to the [facility] in [place]. My last day will be 11/27/2019.  You could see Jadyn Ramírez (nurse practitioner), Joann Lee, and Elliott Santos after I leave.    It has been my pleasure to provide your care over the last few years!    Sincerely,  Sidra Bernardo MD  Internal Medicine/Pediatrics    Patient Education   Personalized Prevention Plan  You are due for the preventive services outlined below.  Your care team is available to assist you in scheduling these services.  If you have already completed any of these items, please share that information with your care team to update in your medical record.  Health Maintenance Due   Topic Date Due     ANNUAL REVIEW OF HM ORDERS  1950     PHQ-2  01/01/2019     Flu Vaccine (1) 09/01/2019     FALL RISK ASSESSMENT  10/08/2019     Annual Wellness Visit  10/08/2019

## 2019-10-21 NOTE — PROGRESS NOTES
"SUBJECTIVE:   Lolis Villarreal is a 69 year old female who presents for Preventive Visit.  Are you in the first 12 months of your Medicare coverage?  No    Healthy Habits:     In general, how would you rate your overall health?  Good    Frequency of exercise:  4-5 days/week    Duration of exercise:  45-60 minutes    Do you usually eat at least 4 servings of fruit and vegetables a day, include whole grains    & fiber and avoid regularly eating high fat or \"junk\" foods?  Yes    Taking medications regularly:  Not Applicable    Medication side effects:  None    Ability to successfully perform activities of daily living:  No assistance needed    Home Safety:  No safety concerns identified    Hearing Impairment:  No hearing concerns    In the past 6 months, have you been bothered by leaking of urine?  No    In general, how would you rate your overall mental or emotional health?  Good      PHQ-2 Total Score: 0    Cholesterol: has been a little elevated in the past. Does not want to take a statin because of multiple relatives with severe reactions in the past. Continues on Krill oil.    H/o squamous cell skin cancer- follows with Dermatology.    Do you feel safe in your environment? Yes    Do you have a Health Care Directive? Yes: Advance Directive has been received and scanned.    Fall risk  Fallen 2 or more times in the past year?: No  Any fall with injury in the past year?: No    Cognitive Screening   1) Repeat 3 items (Leader, Season, Table)    2) Clock draw: NORMAL  3) 3 item recall: Recalls 3 objects  Results: 3 items recalled: COGNITIVE IMPAIRMENT LESS LIKELY    Mini-CogTM Copyright S Lang. Licensed by the author for use in [facility]; reprinted with permission (helena@.[facility]). All rights reserved.      Do you have sleep apnea, excessive snoring or daytime drowsiness?: yes    Reviewed and updated as needed this visit by clinical staff  Tobacco  Allergies  Meds  Problems  Med Hx  Surg Hx  Fam Hx  " Soc Hx        Reviewed and updated as needed this visit by Provider  Allergies  Meds  Problems        Social History     Tobacco Use     Smoking status: Never Smoker     Smokeless tobacco: Never Used   Substance Use Topics     Alcohol use: No     Alcohol/week: 0.0 standard drinks     If you drink alcohol do you typically have >3 drinks per day or >7 drinks per week? No    Alcohol Use 10/21/2019   Prescreen: >3 drinks/day or >7 drinks/week? No   Prescreen: >3 drinks/day or >7 drinks/week? -   No flowsheet data found.    Current providers sharing in care for this patient include:   Patient Care Team:  Sidra Bernardo MD as PCP - General (Internal Medicine)  Sidra Bernardo MD as Assigned PCP    The following health maintenance items are reviewed in Epic and correct as of today:  Health Maintenance   Topic Date Due     ANNUAL REVIEW OF HM ORDERS  1950     PHQ-2  01/01/2019     INFLUENZA VACCINE (1) 09/01/2019     FALL RISK ASSESSMENT  10/08/2019     MEDICARE ANNUAL WELLNESS VISIT  10/08/2019     MAMMO SCREENING  10/08/2020     COLONOSCOPY  11/05/2020     DEXA  10/09/2021     ADVANCE CARE PLANNING  04/11/2023     LIPID  10/08/2023     DTAP/TDAP/TD IMMUNIZATION (3 - Td) 10/08/2028     HEPATITIS C SCREENING  Completed     PNEUMOCOCCAL IMMUNIZATION 65+ LOW/MEDIUM RISK  Completed     ZOSTER IMMUNIZATION  Completed     IPV IMMUNIZATION  Aged Out     MENINGITIS IMMUNIZATION  Aged Out     Patient Active Problem List   Diagnosis     CARDIOVASCULAR SCREENING; LDL GOAL LESS THAN 160     Advanced directives, counseling/discussion     Dry mouth     WEDNY (obstructive sleep apnea)     Squamous cell skin cancer     Past Surgical History:   Procedure Laterality Date     C NONSPECIFIC PROCEDURE  2003    cervical spine surgery      C NONSPECIFIC PROCEDURE      varacose carroll removal right     ENHANCE LASER REFRACTIVE BILATERAL EXISTING PT IN PARAMETERS       HYSTERECTOMY, PAP NO LONGER INDICATED       SEPTOPLASTY,  TURBINOPLASTY, COMBINED  2012    Procedure:COMBINED SEPTOPLASTY, TURBINOPLASTY; SEPTOPLASTY, TURBINATE Surgery ; Surgeon:OPLA FUENTES; Location:[facility] OR     TONSILLECTOMY & ADENOIDECTOMY  age 6       Social History     Tobacco Use     Smoking status: Never Smoker     Smokeless tobacco: Never Used   Substance Use Topics     Alcohol use: No     Alcohol/week: 0.0 standard drinks     Family History   Problem Relation Age of Onset     Cancer - colorectal Mother         born 1927 cancer 75yo and arthritis and thyroid     Alzheimer Disease Mother         Alzheimers/dementia     Cerebrovascular Disease Mother 84     Heart Disease Father         born 1927 and HTN      Cancer Father 84        brain/lung cancer     Diabetes Sister      Sjogren's Sister 69     Diabetes Maternal Grandfather         dceased 75yo     Family History Negative Maternal Grandmother          81yo     Cancer Paternal Grandfather          88yo stomach     Arthritis Paternal Grandmother          93yo     Family History Negative Daughter      Family History Negative Daughter      Respiratory Daughter         asthma     Arthritis Son         rheumatoid arthritis     Arthritis Other      Macular Degeneration Maternal Aunt      Glaucoma No family hx of          Review of Systems   Constitutional: Negative for chills and fever.   HENT: Negative for congestion, ear pain, hearing loss and sore throat.    Eyes: Negative for pain and visual disturbance.   Respiratory: Negative for cough and shortness of breath.    Cardiovascular: Negative for chest pain, palpitations and peripheral edema.   Gastrointestinal: Negative for abdominal pain, constipation, diarrhea, heartburn, hematochezia and nausea.   Breasts:  Negative for tenderness, breast mass and discharge.   Genitourinary: Negative for dysuria, frequency, genital sores, hematuria, pelvic pain, urgency, vaginal bleeding and vaginal discharge.   Musculoskeletal: Positive for arthralgias  "and joint swelling. Negative for myalgias.   Skin: Negative for rash.   Neurological: Negative for dizziness, weakness, headaches and paresthesias.   Psychiatric/Behavioral: Negative for mood changes. The patient is not nervous/anxious.    All other systems reviewed and are negative.    Turmeric helps with joint pain and swelling.    OBJECTIVE:   /64 (BP Location: Right arm, Patient Position: Sitting, Cuff Size: Adult Regular)   Pulse 75   Temp 98.3  F (36.8  C) (Tympanic)   Ht 1.588 m (5' 2.5\")   Wt 71.9 kg (158 lb 8 oz)   SpO2 97%   BMI 28.53 kg/m   Estimated body mass index is 28.53 kg/m  as calculated from the following:    Height as of this encounter: 1.588 m (5' 2.5\").    Weight as of this encounter: 71.9 kg (158 lb 8 oz).  Physical Exam  GENERAL APPEARANCE: healthy, alert and no distress  EYES: Eyes grossly normal to inspection, PERRL and conjunctivae and sclerae normal  HENT: ear canals and TM's normal, nose and mouth without ulcers or lesions, oropharynx clear and oral mucous membranes moist  NECK: no adenopathy, no asymmetry, masses, or scars and thyroid normal to palpation, no carotid bruits  RESP: lungs clear to auscultation - no rales, rhonchi or wheezes  BREAST: declined due to mammo today  CV: regular rate and rhythm, normal S1 S2, no S3 or S4, no murmur, click or rub, no peripheral edema and peripheral pulses strong  ABDOMEN: soft, nontender, no hepatosplenomegaly, no masses and bowel sounds normal  MS: no musculoskeletal defects are noted and gait is age appropriate without ataxia  SKIN: no suspicious lesions or rashes  NEURO: Normal strength and tone, sensory exam grossly normal, mentation intact and speech normal  PSYCH: mentation appears normal and affect normal/bright    Diagnostic Test Results:  pending    ASSESSMENT / PLAN:   1. Encounter for Medicare annual wellness exam  Pap no longer indicated due to age and hyst  Mammo today  Colonoscopy due next year  Dexa due in " "2021  Shingrix, Tdap, pneumonia vaccines UTD  Flu shot today    2. Screening for diabetes mellitus  Will stop taking cinnamon.   - Comprehensive metabolic panel    3. Screening cholesterol level  Continues on Krill oil. Declines statin given family history of reactions. Will recalculate ASCVD risk when returns.  - Lipid panel reflex to direct LDL Fasting    4. Needs flu shot  - INFLUENZA (HIGH DOSE) 3 VALENT VACCINE [25663]  - ADMIN INFLUENZA  (For MEDICARE Patients ONLY) []    End of Life Planning:  Patient currently has an advanced directive: Yes.  Practitioner is supportive of decision.    COUNSELING:  Reviewed preventive health counseling, as reflected in patient instructions  Special attention given to:       Regular exercise       Healthy diet/nutrition       Immunizations    Vaccinated for: Influenza         Osteoporosis Prevention/Bone Health       Colon cancer screening       Advanced Planning     Estimated body mass index is 28.53 kg/m  as calculated from the following:    Height as of this encounter: 1.588 m (5' 2.5\").    Weight as of this encounter: 71.9 kg (158 lb 8 oz).         reports that she has never smoked. She has never used smokeless tobacco.      Appropriate preventive services were discussed with this patient, including applicable screening as appropriate for cardiovascular disease, diabetes, osteopenia/osteoporosis, and glaucoma.  As appropriate for age/gender, discussed screening for colorectal cancer, prostate cancer, breast cancer, and cervical cancer. Checklist reviewing preventive services available has been given to the patient.    Reviewed patients plan of care and provided an AVS. The Basic Care Plan (routine screening as documented in Health Maintenance) for Lolis meets the Care Plan requirement. This Care Plan has been established and reviewed with the Patient.    Counseling Resources:  ATP IV Guidelines  Pooled Cohorts Equation Calculator  Breast Cancer Risk Calculator  FRAX " Risk Assessment  ICSI Preventive Guidelines  Dietary Guidelines for Americans, 2010  USDA's MyPlate  ASA Prophylaxis  Lung CA Screening    Sidra Bernardo MD  [facility]    Identified Health Risks:

## 2019-11-15 ENCOUNTER — OFFICE VISIT (OUTPATIENT)
Dept: SLEEP MEDICINE | Facility: CLINIC | Age: 69
End: 2019-11-15
Payer: COMMERCIAL

## 2019-11-15 VITALS
WEIGHT: 151 LBS | HEIGHT: 63 IN | DIASTOLIC BLOOD PRESSURE: 73 MMHG | BODY MASS INDEX: 26.75 KG/M2 | RESPIRATION RATE: 18 BRPM | HEART RATE: 107 BPM | SYSTOLIC BLOOD PRESSURE: 114 MMHG | OXYGEN SATURATION: 95 %

## 2019-11-15 DIAGNOSIS — G47.33 OSA (OBSTRUCTIVE SLEEP APNEA): Primary | ICD-10-CM

## 2019-11-15 PROCEDURE — 99214 OFFICE O/P EST MOD 30 MIN: CPT | Performed by: INTERNAL MEDICINE

## 2019-11-15 ASSESSMENT — MIFFLIN-ST. JEOR: SCORE: 1176.12

## 2019-11-15 NOTE — NURSING NOTE
"/73   Pulse 107   Resp 18   Ht 1.588 m (5' 2.5\")   Wt 68.5 kg (151 lb)   SpO2 95%   BMI 27.18 kg/m      Neck 36cm/14.25inches    DME-cpap    ESS- 3  KEVIN-15    Med Rec-complete    Sheri Blackmon, Medical Assistant 11/15/2019 3:59 PM      "

## 2019-11-15 NOTE — PATIENT INSTRUCTIONS
Here are the ranges based off your height and current weight.    Body mass index is 27.18 kg/m .        Underweight = <18.5  Normal weight = 18.5-24.9   Overweight = 25-29.9   Obesity = BMI of 30 or greater       Your BMI is Body mass index is 27.18 kg/m .  Weight management is a personal decision.  If you are interested in exploring weight loss strategies, the following discussion covers the approaches that may be successful. Body mass index (BMI) is one way to tell whether you are at a healthy weight, overweight, or obese. It measures your weight in relation to your height.  A BMI of 18.5 to 24.9 is in the healthy range. A person with a BMI of 25 to 29.9 is considered overweight, and someone with a BMI of 30 or greater is considered obese. More than two-thirds of American adults are considered overweight or obese.  Being overweight or obese increases the risk for further weight gain. Excess weight may lead to heart disease and diabetes.  Creating and following plans for healthy eating and physical activity may help you improve your health.  Weight control is part of healthy lifestyle and includes exercise, emotional health, and healthy eating habits. Careful eating habits lifelong are the mainstay of weight control. Though there are significant health benefits from weight loss, long-term weight loss with diet alone may be very difficult to achieve- studies show long-term success with dietary management in less than 10% of people. Attaining a healthy weight may be especially difficult to achieve in those with severe obesity. In some cases, medications, devices and surgical management might be considered.  What can you do?  If you are overweight or obese and are interested in methods for weight loss, you should discuss this with your provider.     Consider reducing daily calorie intake by 500 calories.     Keep a food journal.     Avoiding skipping meals, consider cutting portions instead.    Diet combined with  exercise helps maintain muscle while optimizing fat loss. Strength training is particularly important for building and maintaining muscle mass. Exercise helps reduce stress, increase energy, and improves fitness. Increasing exercise without diet control, however, may not burn enough calories to loose weight.       Start walking three days a week 10-20 minutes at a time    Work towards walking thirty minutes five days a week     Eventually, increase the speed of your walking for 1-2 minutes at time    In addition, we recommend that you review healthy lifestyles and methods for weight loss available through the National Institutes of Health patient information sites:  http://win.niddk.nih.gov/publications/index.htm    And look into health and wellness programs that may be available through your health insurance provider, employer, local community center, or zaki club.    Weight management plan: Patient was referred to their PCP to discuss a diet and exercise plan.   Your blood pressure was checked while you were in clinic today.  Please read the guidelines below about what these numbers mean and what you should do about them.  Your systolic blood pressure is the top number.  This is the pressure when the heart is pumping.  Your diastolic blood pressure is the bottom number.  This is the pressure in between beats.  If your systolic blood pressure is less than 120 and your diastolic blood pressure is less than 80, then your blood pressure is normal. There is nothing more that you need to do about it  If your systolic blood pressure is 120-139 or your diastolic blood pressure is 80-89, your blood pressure may be higher than it should be.  You should have your blood pressure re-checked within a year by a primary care provider.  If your systolic blood pressure is 140 or greater or your diastolic blood pressure is 90 or greater, you may have high blood pressure.  High blood pressure is treatable, but if left untreated over  time it can put you at risk for heart attack, stroke, or kidney failure.  You should have your blood pressure re-checked by a primary care provider within the next four weeks.

## 2019-11-15 NOTE — PROGRESS NOTES
[facility]  Outpatient Sleep Medicine Consultation  November 15, 2019      Name: Lolis Villarreal MRN# [id number]   Age: 69 year old YOB: 1950     Date of Consultation: November 15, 2019  Consultation is requested by: No referring provider defined for this encounter.  Primary care provider: Sidra Bernardo             Assessment and Plan:       Mild positional obstructive sleep apnea without hypoxemia and current symptoms and subsequent weight loss since previous studies and lateral sleep preference.      Summary Recommendations:      Patient wishes to discontinue CPAP and was counseled that she has no significant health risk related to her asymptomatic positional sleep apnea.  She will use sleep positioning moving forward and will avoid alcohol before going to bed.             History of Present Illness:     Lolis Villarreal is a 69 year old female with mild sleep apnea substantially worse supine and without improvement with CPAP therapy.  She had no associated hypoxemia and no cardiovascular risk factors.  In retrospect she feels she is never had sleepiness and her [place] Sleepiness Scale was normal at her first visit.  She is having no difficulty initiating or maintaining sleep currently.    PREVIOUS SLEEP STUDIES:  Date: 5/7/2014  AHI: 10.3 without hypoxemia at 168 pounds  Intervention: CPAP    CPAP Compliance:  Dates: October 15 to November 13, 2019       47 % >4hour use       Average Use 3.6 hours due to skipped today's with average use on days used 7 hours  Leak insignificant  Residual AHI 1.9           Medications:     Current Outpatient Medications   Medication Sig     cetirizine (ZYRTEC) 10 MG tablet Take 1 tablet (10 mg) by mouth every evening     Krill Oil 1000 MG CAPS      multivitamin, therapeutic with minerals (MULTI-VITAMIN) TABS Take 1 tablet by mouth daily     order for DME Uses C-pap.     Turmeric 500 MG CAPS      Vitamin D, Ergocalciferol,  2000 units CAPS      No current facility-administered medications for this visit.         Allergies   Allergen Reactions     Dust Mites             Past Medical History:     Does not need 02 supplement at night   Past Medical History:   Diagnosis Date     Arthritis      Chronic pain     sciatic pain left leg to toes     Numbness and tingling     left leg     PONV (postoperative nausea and vomiting)     hx nausea postop             Past Surgical History:    No* h/o  upper airway surgery  Past Surgical History:   Procedure Laterality Date     C NONSPECIFIC PROCEDURE  2003    cervical spine surgery      C NONSPECIFIC PROCEDURE      varacose carroll removal right     ENHANCE LASER REFRACTIVE BILATERAL EXISTING PT IN PARAMETERS       HYSTERECTOMY, PAP NO LONGER INDICATED       SEPTOPLASTY, TURBINOPLASTY, COMBINED  1/20/2012    Procedure:COMBINED SEPTOPLASTY, TURBINOPLASTY; SEPTOPLASTY, TURBINATE Surgery ; Surgeon:OPAL FUENTES; Location:[facility] OR     TONSILLECTOMY & ADENOIDECTOMY  age 6                      Physical Examination:   There were no vitals taken for this visit.             Copy to: Sidra Bernardo MD 11/15/2019     [facility]   Floor 1, [address]   [address] [place] [address]   Appointments: [phone]    [facility] [employer]  3rd Floor  [address] , [place] [address]     Total time spent with patient: 25 min >50% counseling

## 2020-01-12 ENCOUNTER — TRANSFERRED RECORDS (OUTPATIENT)
Dept: HEALTH INFORMATION MANAGEMENT | Facility: CLINIC | Age: 70
End: 2020-01-12

## 2020-07-03 DIAGNOSIS — I83.813 VARICOSE VEINS OF BILATERAL LOWER EXTREMITIES WITH PAIN: Primary | ICD-10-CM

## 2020-09-22 DIAGNOSIS — I83.813 VARICOSE VEINS OF BILATERAL LOWER EXTREMITIES WITH PAIN: Primary | ICD-10-CM

## 2020-12-06 ENCOUNTER — HEALTH MAINTENANCE LETTER (OUTPATIENT)
Age: 70
End: 2020-12-06

## 2021-01-12 ENCOUNTER — OFFICE VISIT (OUTPATIENT)
Dept: VASCULAR SURGERY | Facility: CLINIC | Age: 71
End: 2021-01-12
Attending: SURGERY
Payer: COMMERCIAL

## 2021-01-12 ENCOUNTER — ANCILLARY PROCEDURE (OUTPATIENT)
Dept: ULTRASOUND IMAGING | Facility: CLINIC | Age: 71
End: 2021-01-12
Attending: SURGERY
Payer: COMMERCIAL

## 2021-01-12 DIAGNOSIS — I83.813 VARICOSE VEINS OF BILATERAL LOWER EXTREMITIES WITH PAIN: ICD-10-CM

## 2021-01-12 DIAGNOSIS — I83.813 VARICOSE VEINS OF BILATERAL LOWER EXTREMITIES WITH PAIN: Primary | ICD-10-CM

## 2021-01-12 PROCEDURE — 93970 EXTREMITY STUDY: CPT | Performed by: SURGERY

## 2021-01-12 PROCEDURE — 99213 OFFICE O/P EST LOW 20 MIN: CPT | Performed by: SURGERY

## 2021-01-12 RX ORDER — OMEGA-3 FATTY ACIDS/FISH OIL 300-1000MG
CAPSULE ORAL
COMMUNITY
End: 2023-07-20

## 2021-01-12 RX ORDER — ESCITALOPRAM OXALATE 10 MG/1
10 TABLET ORAL DAILY
COMMUNITY
End: 2021-02-02

## 2021-01-12 RX ORDER — RIBOFLAVIN (VITAMIN B2) 100 MG
100 TABLET ORAL 3 TIMES DAILY
COMMUNITY
End: 2023-07-20

## 2021-01-12 NOTE — LETTER
1/12/2021         RE: Lolis Mcdonald  [address] [address] [address]        Dear Colleague,    Thank you for referring your patient, Lolis Mcdonald, to the [facility] [place]. Please see a copy of my visit note below.        VEINSOLUTIONS NEW PATIENT:                Vein solutions established patient visit  Lolis Mcdonald was last seen on 9/10/2019 regarding bilateral lower extremity pain and recurrent right lower extremity varicose veins.  She underwent what she describes as a laser ablation of her right lower extremity in 2010.    She returns now for ongoing intermittent symptoms located primarily from her knees to her ankles, described as an aching pain and even somewhat restless leg symptoms, especially in the evenings.  This can sometimes be symptomatic enough that it does slow down her activities and rarely requires over-the-counter analgesics.  Symptoms occurred despite the use of compression hose or nearly daily basis.  She walks regularly and exercise to control her weight.    She has no history of deep vein thrombosis, superficial thrombophlebitis or hemorrhage.    Her past medical history is significant for obstructive sleep apnea, squamous cell skin cancer    Medicines were reviewed.  She is allergic to dust mites.    12 point review of systems was completed and was reviewed.  It is significant for arthritis, back pain, neck pain and leg pain as described.  She also admits to occipital headaches near the upper spine and anxiety.    Physical exam  General: Pleasant female in no acute distress  Psychiatric she has 3 mm varicosities on the right pretibial area: Judgment, insight, mood and affect are normal  Right lower extremity: No edema or venous stasis changes.  3 mm varicosities located on the right pretibial area.  Reticular veins/telangiectasias coursing down the mid anterior thigh distally and medially.  4 mm, localized bulge on the posterior medial right calf.    Left lower  extremity: No significant varicose veins, edema or venous stasis changes.    Venous duplex ultrasound  Right lower extremity: No evidence of deep vein thrombosis.  The right common femoral vein is incompetent but the remaining deep vein valves are competent.  The right great saphenous vein is surgically absent from 12.4 mm from the saphenofemoral junction to the proximal calf where it reconstitutes and is patent from the calf to the ankle, is incompetent with reflux time of 2.8 seconds.  Right small saphenous vein and anterior accessory saphenous vein are competent.  The right Vein of Giacomini is incompetent, measures 3.6 mm in diameter with reflux time of 1.3 seconds.    There are no significant incompetent perforators.    Left lower extremity: No evidence of deep vein thrombosis.  The left popliteal vein is incompetent but the remaining deep vein valves are competent.  The  left great saphenous vein is incompetent at the saphenofemoral junction in the proximal thigh but otherwise is competent.  The left small saphenous vein and anterior accessory saphenous vein are competent.  The left Vein of Giacomini is incompetent but small.  There are no incompetent perforators appreciated.    Impression  I do not see significant venous insufficiency that would explain her pain.  She has limited below-knee right great saphenous vein incompetence but the vein is small with no significant varicosities coursing from the.  Her symptoms are the same of both legs and she has no significant incompetence of her left upper extremity.    Plan  Continue conservative management with compression, leg elevation, dietary measures and exercise.  She walks on a daily basis, controls her weight and wears compression on a daily basis as well.  Risk of conservative management including a small chance of superficial thrombophlebitis and progression of the disease process were discussed.  She voiced understanding will contact us if she has other  concerns or questions.    Total time for review of medical records, past clinic visits, past ultrasound and charting 43 minutes    FRANCO Thomas MD    Dictated using Dragon voice recognition software which may result in transcription errors        Again, thank you for allowing me to participate in the care of your patient.        Sincerely,        Leland Thomas MD

## 2021-01-12 NOTE — PROGRESS NOTES
Vein solutions established patient visit  Lolis Mcdonald was last seen on 9/10/2019 regarding bilateral lower extremity pain and recurrent right lower extremity varicose veins.  She underwent what she describes as a laser ablation of her right lower extremity in 2010.    She returns now for ongoing intermittent symptoms located primarily from her knees to her ankles, described as an aching pain and even somewhat restless leg symptoms, especially in the evenings.  This can sometimes be symptomatic enough that it does slow down her activities and rarely requires over-the-counter analgesics.  Symptoms occurred despite the use of compression hose or nearly daily basis.  She walks regularly and exercise to control her weight.    She has no history of deep vein thrombosis, superficial thrombophlebitis or hemorrhage.    Her past medical history is significant for obstructive sleep apnea, squamous cell skin cancer    Medicines were reviewed.  She is allergic to dust mites.    12 point review of systems was completed and was reviewed.  It is significant for arthritis, back pain, neck pain and leg pain as described.  She also admits to occipital headaches near the upper spine and anxiety.    Physical exam  General: Pleasant female in no acute distress  Psychiatric she has 3 mm varicosities on the right pretibial area: Judgment, insight, mood and affect are normal  Right lower extremity: No edema or venous stasis changes.  3 mm varicosities located on the right pretibial area.  Reticular veins/telangiectasias coursing down the mid anterior thigh distally and medially.  4 mm, localized bulge on the posterior medial right calf.    Left lower extremity: No significant varicose veins, edema or venous stasis changes.    Venous duplex ultrasound  Right lower extremity: No evidence of deep vein thrombosis.  The right common femoral vein is incompetent but the remaining deep vein valves are competent.  The right great saphenous vein is  surgically absent from 12.4 mm from the saphenofemoral junction to the proximal calf where it reconstitutes and is patent from the calf to the ankle, is incompetent with reflux time of 2.8 seconds.  Right small saphenous vein and anterior accessory saphenous vein are competent.  The right Vein of Giacomini is incompetent, measures 3.6 mm in diameter with reflux time of 1.3 seconds.    There are no significant incompetent perforators.    Left lower extremity: No evidence of deep vein thrombosis.  The left popliteal vein is incompetent but the remaining deep vein valves are competent.  The  left great saphenous vein is incompetent at the saphenofemoral junction in the proximal thigh but otherwise is competent.  The left small saphenous vein and anterior accessory saphenous vein are competent.  The left Vein of Giacomini is incompetent but small.  There are no incompetent perforators appreciated.    Impression  I do not see significant venous insufficiency that would explain her pain.  She has limited below-knee right great saphenous vein incompetence but the vein is small with no significant varicosities coursing from the.  Her symptoms are the same of both legs and she has no significant incompetence of her left upper extremity.    Plan  Continue conservative management with compression, leg elevation, dietary measures and exercise.  She walks on a daily basis, controls her weight and wears compression on a daily basis as well.  Risk of conservative management including a small chance of superficial thrombophlebitis and progression of the disease process were discussed.  She voiced understanding will contact us if she has other concerns or questions.    Total time for review of medical records, past clinic visits, past ultrasound and charting 43 minutes    C Naima Thomas MD    Dictated using Dragon voice recognition software which may result in transcription errors

## 2021-01-30 ASSESSMENT — ENCOUNTER SYMPTOMS
MYALGIAS: 1
HEMATOCHEZIA: 0
SORE THROAT: 0
PALPITATIONS: 0
FEVER: 0
DYSURIA: 0
CONSTIPATION: 0
COUGH: 0
JOINT SWELLING: 0
EYE PAIN: 0
DIZZINESS: 0
HEARTBURN: 0
ARTHRALGIAS: 1
SHORTNESS OF BREATH: 0
ABDOMINAL PAIN: 0
HEADACHES: 0
HEMATURIA: 0
NERVOUS/ANXIOUS: 1
DIARRHEA: 0
CHILLS: 0
PARESTHESIAS: 0
BREAST MASS: 0
NAUSEA: 0
FREQUENCY: 0
WEAKNESS: 0

## 2021-01-30 ASSESSMENT — ACTIVITIES OF DAILY LIVING (ADL): CURRENT_FUNCTION: NO ASSISTANCE NEEDED

## 2021-02-02 ENCOUNTER — OFFICE VISIT (OUTPATIENT)
Dept: INTERNAL MEDICINE | Facility: CLINIC | Age: 71
End: 2021-02-02
Payer: COMMERCIAL

## 2021-02-02 VITALS
WEIGHT: 151 LBS | DIASTOLIC BLOOD PRESSURE: 77 MMHG | HEIGHT: 62 IN | RESPIRATION RATE: 20 BRPM | SYSTOLIC BLOOD PRESSURE: 116 MMHG | HEART RATE: 80 BPM | OXYGEN SATURATION: 100 % | BODY MASS INDEX: 27.79 KG/M2 | TEMPERATURE: 97.1 F

## 2021-02-02 DIAGNOSIS — F41.9 ANXIETY: ICD-10-CM

## 2021-02-02 DIAGNOSIS — Z00.00 ENCOUNTER FOR PREVENTIVE CARE: Primary | ICD-10-CM

## 2021-02-02 DIAGNOSIS — G47.33 OSA (OBSTRUCTIVE SLEEP APNEA): ICD-10-CM

## 2021-02-02 LAB
BASOPHILS # BLD AUTO: 0 10E9/L (ref 0–0.2)
BASOPHILS NFR BLD AUTO: 0.3 %
DIFFERENTIAL METHOD BLD: NORMAL
EOSINOPHIL # BLD AUTO: 0.2 10E9/L (ref 0–0.7)
EOSINOPHIL NFR BLD AUTO: 3.7 %
ERYTHROCYTE [DISTWIDTH] IN BLOOD BY AUTOMATED COUNT: 12.9 % (ref 10–15)
HCT VFR BLD AUTO: 42.9 % (ref 35–47)
HGB BLD-MCNC: 13.9 G/DL (ref 11.7–15.7)
LYMPHOCYTES # BLD AUTO: 2 10E9/L (ref 0.8–5.3)
LYMPHOCYTES NFR BLD AUTO: 34.3 %
MCH RBC QN AUTO: 30 PG (ref 26.5–33)
MCHC RBC AUTO-ENTMCNC: 32.4 G/DL (ref 31.5–36.5)
MCV RBC AUTO: 93 FL (ref 78–100)
MONOCYTES # BLD AUTO: 0.4 10E9/L (ref 0–1.3)
MONOCYTES NFR BLD AUTO: 6.8 %
NEUTROPHILS # BLD AUTO: 3.1 10E9/L (ref 1.6–8.3)
NEUTROPHILS NFR BLD AUTO: 54.9 %
PLATELET # BLD AUTO: 214 10E9/L (ref 150–450)
RBC # BLD AUTO: 4.64 10E12/L (ref 3.8–5.2)
WBC # BLD AUTO: 5.7 10E9/L (ref 4–11)

## 2021-02-02 PROCEDURE — 84443 ASSAY THYROID STIM HORMONE: CPT | Performed by: INTERNAL MEDICINE

## 2021-02-02 PROCEDURE — 80053 COMPREHEN METABOLIC PANEL: CPT | Performed by: INTERNAL MEDICINE

## 2021-02-02 PROCEDURE — 85025 COMPLETE CBC W/AUTO DIFF WBC: CPT | Performed by: INTERNAL MEDICINE

## 2021-02-02 PROCEDURE — 36415 COLL VENOUS BLD VENIPUNCTURE: CPT | Performed by: INTERNAL MEDICINE

## 2021-02-02 PROCEDURE — G0438 PPPS, INITIAL VISIT: HCPCS | Performed by: INTERNAL MEDICINE

## 2021-02-02 PROCEDURE — 80061 LIPID PANEL: CPT | Performed by: INTERNAL MEDICINE

## 2021-02-02 RX ORDER — CHOLECALCIFEROL (VITAMIN D3) 50 MCG
1 TABLET ORAL DAILY
COMMUNITY
Start: 2021-02-02

## 2021-02-02 RX ORDER — ESCITALOPRAM OXALATE 10 MG/1
10 TABLET ORAL DAILY
Qty: 90 TABLET | Refills: 3 | Status: SHIPPED | OUTPATIENT
Start: 2021-02-02 | End: 2021-11-18

## 2021-02-02 RX ORDER — UREA 10 %
500 LOTION (ML) TOPICAL 2 TIMES DAILY
COMMUNITY
Start: 2021-02-02

## 2021-02-02 ASSESSMENT — MIFFLIN-ST. JEOR: SCORE: 1158.18

## 2021-02-02 ASSESSMENT — ENCOUNTER SYMPTOMS
BREAST MASS: 0
JOINT SWELLING: 0
DIARRHEA: 0
FEVER: 0
EYE PAIN: 0
MYALGIAS: 1
ABDOMINAL PAIN: 0
HEARTBURN: 0
HEMATOCHEZIA: 0
HEADACHES: 0
SHORTNESS OF BREATH: 0
SORE THROAT: 0
ARTHRALGIAS: 1
CHILLS: 0
PARESTHESIAS: 0
CONSTIPATION: 0
HEMATURIA: 0
COUGH: 0
DIZZINESS: 0
NERVOUS/ANXIOUS: 1
PALPITATIONS: 0
DYSURIA: 0
FREQUENCY: 0
NAUSEA: 0
WEAKNESS: 0

## 2021-02-02 ASSESSMENT — ANXIETY QUESTIONNAIRES
1. FEELING NERVOUS, ANXIOUS, OR ON EDGE: NEARLY EVERY DAY
IF YOU CHECKED OFF ANY PROBLEMS ON THIS QUESTIONNAIRE, HOW DIFFICULT HAVE THESE PROBLEMS MADE IT FOR YOU TO DO YOUR WORK, TAKE CARE OF THINGS AT HOME, OR GET ALONG WITH OTHER PEOPLE: VERY DIFFICULT
GAD7 TOTAL SCORE: 16
6. BECOMING EASILY ANNOYED OR IRRITABLE: SEVERAL DAYS
7. FEELING AFRAID AS IF SOMETHING AWFUL MIGHT HAPPEN: SEVERAL DAYS
2. NOT BEING ABLE TO STOP OR CONTROL WORRYING: NEARLY EVERY DAY
5. BEING SO RESTLESS THAT IT IS HARD TO SIT STILL: MORE THAN HALF THE DAYS
3. WORRYING TOO MUCH ABOUT DIFFERENT THINGS: NEARLY EVERY DAY

## 2021-02-02 ASSESSMENT — ACTIVITIES OF DAILY LIVING (ADL): CURRENT_FUNCTION: NO ASSISTANCE NEEDED

## 2021-02-02 ASSESSMENT — PATIENT HEALTH QUESTIONNAIRE - PHQ9
5. POOR APPETITE OR OVEREATING: NEARLY EVERY DAY
SUM OF ALL RESPONSES TO PHQ QUESTIONS 1-9: 6

## 2021-02-02 NOTE — PROGRESS NOTES
"SUBJECTIVE:   Lolis Mcdonald is a 71 year old female who presents for Preventive Visit.    Fasting.    Patient has been advised of split billing requirements and indicates understanding: Yes   Are you in the first 12 months of your Medicare coverage?  No    Healthy Habits:     In general, how would you rate your overall health?  Good    Frequency of exercise:  4-5 days/week    Duration of exercise:  15-30 minutes    Do you usually eat at least 4 servings of fruit and vegetables a day, include whole grains    & fiber and avoid regularly eating high fat or \"junk\" foods?  Yes    Taking medications regularly:  Yes    Medication side effects:  None    Ability to successfully perform activities of daily living:  No assistance needed    Home Safety:  No safety concerns identified    Hearing Impairment:  No hearing concerns    In the past 6 months, have you been bothered by leaking of urine?  No    In general, how would you rate your overall mental or emotional health?  Good      PHQ-2 Total Score: 0    Additional concerns today:  Yes    Do you feel safe in your environment? Yes    Have you ever done Advance Care Planning? (For example, a Health Directive, POLST, or a discussion with a medical provider or your loved ones about your wishes): Yes, advance care planning is on file.       Fall risk  Fallen 2 or more times in the past year?: No  Any fall with injury in the past year?: No    Cognitive Screening   1) Repeat 3 items (Leader, Season, Table)    2) Clock draw: NORMAL  3) 3 item recall: Recalls 3 objects  Results: 3 items recalled: COGNITIVE IMPAIRMENT LESS LIKELY    Mini-CogTM Copyright TEDDY Croft. Licensed by the author for use in [facility]; reprinted with permission (helena@.[facility]). All rights reserved.      Do you have sleep apnea, excessive snoring or daytime drowsiness?: yes, not using c-pap    Reviewed and updated as needed this visit by clinical staff  Tobacco  Allergies  Meds   Med Hx  Surg Hx  " Fam Hx  Soc Hx        Reviewed and updated as needed this visit by Provider                Social History     Tobacco Use     Smoking status: Never Smoker     Smokeless tobacco: Never Used   Substance Use Topics     Alcohol use: No     Alcohol/week: 0.0 standard drinks     If you drink alcohol do you typically have >3 drinks per day or >7 drinks per week? No    Alcohol Use 2/2/2021   Prescreen: >3 drinks/day or >7 drinks/week? -   Prescreen: >3 drinks/day or >7 drinks/week? No       Current providers sharing in care for this patient include:   Patient Care Team:  Sidra Bernardo MD as PCP - General (Internal Medicine)  Zion Piedra MD as Assigned Sleep Provider  Samira Hamlin MD as Assigned PCP  Leland Thomas MD as Assigned Heart and Vascular Provider    The following health maintenance items are reviewed in Epic and correct as of today:  Health Maintenance   Topic Date Due     FALL RISK ASSESSMENT  10/21/2020     COLORECTAL CANCER SCREENING  11/05/2020     DEXA  10/09/2021     MAMMO SCREENING  10/21/2021     MEDICARE ANNUAL WELLNESS VISIT  02/02/2022     LIPID  10/21/2024     ADVANCE CARE PLANNING  02/02/2026     DTAP/TDAP/TD IMMUNIZATION (3 - Td) 10/08/2028     HEPATITIS C SCREENING  Completed     PHQ-2  Completed     INFLUENZA VACCINE  Completed     Pneumococcal Vaccine: 65+ Years  Completed     ZOSTER IMMUNIZATION  Completed     Pneumococcal Vaccine: Pediatrics (0 to 5 Years) and At-Risk Patients (6 to 64 Years)  Aged Out     IPV IMMUNIZATION  Aged Out     MENINGITIS IMMUNIZATION  Aged Out     HEPATITIS B IMMUNIZATION  Aged Out     BP Readings from Last 3 Encounters:   02/02/21 116/77   11/15/19 114/73   10/21/19 130/64    Wt Readings from Last 3 Encounters:   02/02/21 68.5 kg (151 lb)   11/15/19 68.5 kg (151 lb)   10/21/19 71.9 kg (158 lb 8 oz)                  Mammogram Screening: Recommended mammography every 1-2 years with patient discussion and risk factor  "consideration    Review of Systems   Constitutional: Negative for chills and fever.   HENT: Negative for congestion, ear pain, hearing loss and sore throat.    Eyes: Negative for pain and visual disturbance.   Respiratory: Negative for cough and shortness of breath.    Cardiovascular: Negative for chest pain, palpitations and peripheral edema.   Gastrointestinal: Negative for abdominal pain, constipation, diarrhea, heartburn, hematochezia and nausea.   Breasts:  Negative for tenderness, breast mass and discharge.   Genitourinary: Negative for dysuria, frequency, genital sores, hematuria, pelvic pain, urgency, vaginal bleeding and vaginal discharge.   Musculoskeletal: Positive for arthralgias and myalgias. Negative for joint swelling.   Skin: Negative for rash.   Neurological: Negative for dizziness, weakness, headaches and paresthesias.   Psychiatric/Behavioral: Negative for mood changes. The patient is nervous/anxious.          OBJECTIVE:   /77 (BP Location: Right arm, Patient Position: Chair, Cuff Size: Adult Regular)   Pulse 80   Temp 97.1  F (36.2  C) (Oral)   Resp 20   Ht 1.575 m (5' 2\")   Wt 68.5 kg (151 lb)   SpO2 100%   Breastfeeding No   BMI 27.62 kg/m   Estimated body mass index is 27.62 kg/m  as calculated from the following:    Height as of this encounter: 1.575 m (5' 2\").    Weight as of this encounter: 68.5 kg (151 lb).  Physical Exam  GENERAL: healthy, alert and no distress  EYES: Eyes grossly normal to inspection, PERRL and conjunctivae and sclerae normal  NECK: no adenopathy, no asymmetry, masses, or scars and thyroid normal to palpation  RESP: lungs clear to auscultation - no rales, rhonchi or wheezes  CV: regular rate and rhythm, normal S1 S2, no S3 or S4, no murmur, click or rub, no peripheral edema and peripheral pulses strong  ABDOMEN: soft, nontender, no hepatosplenomegaly, no masses and bowel sounds normal  MS: no gross musculoskeletal defects noted, no edema  SKIN: no " "suspicious lesions or rashes  NEURO: Normal strength and tone, mentation intact and speech normal  PSYCH: mentation appears normal, affect normal/bright      ASSESSMENT / PLAN:   1. Encounter for preventive care     - CBC with platelets and differential  - Comprehensive metabolic panel (BMP + Alb, Alk Phos, ALT, AST, Total. Bili, TP)  - TSH with free T4 reflex  - Lipid Profile (Chol, Trig, HDL, LDL calc)  - GASTROENTEROLOGY ADULT REF PROCEDURE ONLY; Future    2. Anxiety     - escitalopram (LEXAPRO) 10 MG tablet; Take 1 tablet (10 mg) by mouth daily  Dispense: 90 tablet; Refill: 3    3. WENDY (obstructive sleep apnea)         Patient has been advised of split billing requirements and indicates understanding: Yes  COUNSELING:  Reviewed preventive health counseling, as reflected in patient instructions       Regular exercise       Healthy diet/nutrition    Estimated body mass index is 27.62 kg/m  as calculated from the following:    Height as of this encounter: 1.575 m (5' 2\").    Weight as of this encounter: 68.5 kg (151 lb).        She reports that she has never smoked. She has never used smokeless tobacco.      Appropriate preventive services were discussed with this patient, including applicable screening as appropriate for cardiovascular disease, diabetes, osteopenia/osteoporosis, and glaucoma.  As appropriate for age/gender, discussed screening for colorectal cancer, prostate cancer, breast cancer, and cervical cancer. Checklist reviewing preventive services available has been given to the patient.    Reviewed patients plan of care and provided an AVS. The Basic Care Plan (routine screening as documented in Health Maintenance) for Lolis meets the Care Plan requirement. This Care Plan has been established and reviewed with the Patient.    Counseling Resources:  ATP IV Guidelines  Pooled Cohorts Equation Calculator  Breast Cancer Risk Calculator  Breast Cancer: Medication to Reduce Risk  FRAX Risk Assessment  ICSI " Preventive Guidelines  Dietary Guidelines for Americans, 2010  USDA's MyPlate  ASA Prophylaxis  Lung CA Screening    Manuela Sepulveda MD  [facility]    Identified Health Risks:

## 2021-02-03 LAB
ALBUMIN SERPL-MCNC: 3.9 G/DL (ref 3.4–5)
ALP SERPL-CCNC: 63 U/L (ref 40–150)
ALT SERPL W P-5'-P-CCNC: 18 U/L (ref 0–50)
ANION GAP SERPL CALCULATED.3IONS-SCNC: 4 MMOL/L (ref 3–14)
AST SERPL W P-5'-P-CCNC: 13 U/L (ref 0–45)
BILIRUB SERPL-MCNC: 0.4 MG/DL (ref 0.2–1.3)
BUN SERPL-MCNC: 18 MG/DL (ref 7–30)
CALCIUM SERPL-MCNC: 9.5 MG/DL (ref 8.5–10.1)
CHLORIDE SERPL-SCNC: 105 MMOL/L (ref 94–109)
CHOLEST SERPL-MCNC: 215 MG/DL
CO2 SERPL-SCNC: 28 MMOL/L (ref 20–32)
CREAT SERPL-MCNC: 0.66 MG/DL (ref 0.52–1.04)
GFR SERPL CREATININE-BSD FRML MDRD: 89 ML/MIN/{1.73_M2}
GLUCOSE SERPL-MCNC: 92 MG/DL (ref 70–99)
HDLC SERPL-MCNC: 71 MG/DL
LDLC SERPL CALC-MCNC: 120 MG/DL
NONHDLC SERPL-MCNC: 144 MG/DL
POTASSIUM SERPL-SCNC: 4.4 MMOL/L (ref 3.4–5.3)
PROT SERPL-MCNC: 7.2 G/DL (ref 6.8–8.8)
SODIUM SERPL-SCNC: 137 MMOL/L (ref 133–144)
TRIGL SERPL-MCNC: 121 MG/DL
TSH SERPL DL<=0.005 MIU/L-ACNC: 1.69 MU/L (ref 0.4–4)

## 2021-02-03 ASSESSMENT — ANXIETY QUESTIONNAIRES: GAD7 TOTAL SCORE: 16

## 2021-02-18 DIAGNOSIS — Z11.59 ENCOUNTER FOR SCREENING FOR OTHER VIRAL DISEASES: ICD-10-CM

## 2021-03-01 DIAGNOSIS — Z11.59 ENCOUNTER FOR SCREENING FOR OTHER VIRAL DISEASES: ICD-10-CM

## 2021-03-01 LAB
SARS-COV-2 RNA RESP QL NAA+PROBE: NORMAL
SPECIMEN SOURCE: NORMAL

## 2021-03-01 PROCEDURE — U0003 INFECTIOUS AGENT DETECTION BY NUCLEIC ACID (DNA OR RNA); SEVERE ACUTE RESPIRATORY SYNDROME CORONAVIRUS 2 (SARS-COV-2) (CORONAVIRUS DISEASE [COVID-19]), AMPLIFIED PROBE TECHNIQUE, MAKING USE OF HIGH THROUGHPUT TECHNOLOGIES AS DESCRIBED BY CMS-2020-01-R: HCPCS | Performed by: INTERNAL MEDICINE

## 2021-03-01 PROCEDURE — U0005 INFEC AGEN DETEC AMPLI PROBE: HCPCS | Performed by: INTERNAL MEDICINE

## 2021-03-02 LAB
LABORATORY COMMENT REPORT: NORMAL
SARS-COV-2 RNA RESP QL NAA+PROBE: NEGATIVE
SPECIMEN SOURCE: NORMAL

## 2021-03-04 ENCOUNTER — HOSPITAL ENCOUNTER (OUTPATIENT)
Facility: CLINIC | Age: 71
Discharge: HOME | End: 2021-03-04
Attending: INTERNAL MEDICINE | Admitting: INTERNAL MEDICINE
Payer: COMMERCIAL

## 2021-03-04 VITALS
SYSTOLIC BLOOD PRESSURE: 108 MMHG | RESPIRATION RATE: 13 BRPM | OXYGEN SATURATION: 97 % | HEART RATE: 81 BPM | DIASTOLIC BLOOD PRESSURE: 59 MMHG

## 2021-03-04 LAB — COLONOSCOPY: NORMAL

## 2021-03-04 PROCEDURE — 999N000099 HC STATISTIC MODERATE SEDATION < 10 MIN: Performed by: INTERNAL MEDICINE

## 2021-03-04 PROCEDURE — 45378 DIAGNOSTIC COLONOSCOPY: CPT | Performed by: INTERNAL MEDICINE

## 2021-03-04 PROCEDURE — G0121 COLON CA SCRN NOT HI RSK IND: HCPCS | Performed by: INTERNAL MEDICINE

## 2021-03-04 PROCEDURE — 250N000011 HC RX IP 250 OP 636: Performed by: INTERNAL MEDICINE

## 2021-03-04 PROCEDURE — G0500 MOD SEDAT ENDO SERVICE >5YRS: HCPCS | Performed by: INTERNAL MEDICINE

## 2021-03-04 RX ORDER — FENTANYL CITRATE 50 UG/ML
INJECTION, SOLUTION INTRAMUSCULAR; INTRAVENOUS PRN
Status: DISCONTINUED | OUTPATIENT
Start: 2021-03-04 | End: 2021-03-04 | Stop reason: HOSPADM

## 2021-05-13 ENCOUNTER — OFFICE VISIT (OUTPATIENT)
Dept: OPTOMETRY | Facility: CLINIC | Age: 71
End: 2021-05-13
Payer: COMMERCIAL

## 2021-05-13 DIAGNOSIS — H52.4 PRESBYOPIA: ICD-10-CM

## 2021-05-13 DIAGNOSIS — H52.12 MYOPIA OF LEFT EYE: Primary | ICD-10-CM

## 2021-05-13 DIAGNOSIS — H02.889 MEIBOMIAN GLAND DYSFUNCTION: ICD-10-CM

## 2021-05-13 DIAGNOSIS — H26.9 BILATERAL INCIPIENT CATARACTS: ICD-10-CM

## 2021-05-13 PROCEDURE — 92015 DETERMINE REFRACTIVE STATE: CPT | Performed by: OPTOMETRIST

## 2021-05-13 PROCEDURE — 92014 COMPRE OPH EXAM EST PT 1/>: CPT | Performed by: OPTOMETRIST

## 2021-05-13 ASSESSMENT — VISUAL ACUITY
OS_SC: 20/30-1
OD_SC: 20/20
OS_SC: 20/40
OD_SC: 20/60-1
OD_SC+: -1
METHOD: SNELLEN - LINEAR

## 2021-05-13 ASSESSMENT — REFRACTION_MANIFEST
OD_SPHERE: -0.25
OD_AXIS: 020
OD_SPHERE: +0.00
OD_CYLINDER: +0.50
OD_CYLINDER: +0.25
OS_SPHERE: -1.75
METHOD_AUTOREFRACTION: 1
OS_CYLINDER: +1.00
OD_AXIS: 049
OS_AXIS: 168
OS_SPHERE: -2.50
OS_CYLINDER: SPHERE

## 2021-05-13 ASSESSMENT — CUP TO DISC RATIO
OD_RATIO: 0.5
OS_RATIO: 0.45

## 2021-05-13 ASSESSMENT — CONF VISUAL FIELD
OD_NORMAL: 1
METHOD: COUNTING FINGERS
OS_NORMAL: 1

## 2021-05-13 ASSESSMENT — EXTERNAL EXAM - RIGHT EYE: OD_EXAM: NORMAL

## 2021-05-13 ASSESSMENT — TONOMETRY: IOP_METHOD: APPLANATION

## 2021-05-13 ASSESSMENT — EXTERNAL EXAM - LEFT EYE: OS_EXAM: NORMAL

## 2021-05-13 NOTE — LETTER
5/13/2021         RE: Lolis Mcdonald  [address] [facility] [address]        Dear Colleague,    Thank you for referring your patient, Lolis Mcdonald, to the [facility]. Please see a copy of my visit note below.    Chief Complaint   Patient presents with     Annual Eye Exam      Struggles with distance when it's raining or at night  Wants glasses for driving.      Last Eye Exam: 2019  Dilated Previously: Yes. Signs and symptoms of dilation were discussed. Patient consents to dilation today.    What are you currently using to see?  does not use glasses or contacts       Distance Vision Acuity: Noticed gradual change in both eyes    Near Vision Acuity: Satisfied with vision while reading and using computer unaided    Eye Comfort: good  Do you use eye drops? : No      Yvonne Segovia CPO         Medical, surgical and family histories reviewed and updated 5/13/2021.       OBJECTIVE: See Ophthalmology exam    ASSESSMENT:    ICD-10-CM    1. Myopia of left eye  H52.12    2. Presbyopia  H52.4    3. Bilateral incipient cataracts  H26.9    4. 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        Sincerely,        Lupe Hoff, OD

## 2021-05-13 NOTE — PROGRESS NOTES
Chief Complaint   Patient presents with     Annual Eye Exam      Struggles with distance when it's raining or at night  Wants glasses for driving.      Last Eye Exam: 2019  Dilated Previously: Yes. Signs and symptoms of dilation were discussed. Patient consents to dilation today.    What are you currently using to see?  does not use glasses or contacts       Distance Vision Acuity: Noticed gradual change in both eyes    Near Vision Acuity: Satisfied with vision while reading and using computer unaided    Eye Comfort: good  Do you use eye drops? : No      Yvonne Segovia CPO         Medical, surgical and family histories reviewed and updated 5/13/2021.       OBJECTIVE: See Ophthalmology exam    ASSESSMENT:    ICD-10-CM    1. Myopia of left eye  H52.12    2. Presbyopia  H52.4    3. Bilateral incipient cataracts  H26.9    4. 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

## 2021-05-13 NOTE — PATIENT INSTRUCTIONS
Meibomian gland dysfunction or Posterior Blepharitis, is characterized by inflammation along both the uppper and lower eyelid margins. A single row of these glands is present in each lid with openings along the lid margins.  It is often found in association with skin conditions such as rosacea and seborrheic dermatitis.    Symptoms include:  ?Red eyes  ?Gritty or burning sensation  ?Excessive tearing  ?Itchy eyelids  ?Red, swollen eyelids  ?Crusting or matting of eyelashes in the morning  ?Light sensitivity  ?Blurred vision    It is important to keep cosmetics from blocking these oil glands. If blocked, they do not   excrete oil into the tear film, which causes the tears to evaporate quickly.   This may result in watery eyes.  There is also an increase of bacterial growth when the tear film is unstable, leading to further ocular surface inflammation.    Treatment:  1. Warm compresses for 5-10 minutes twice daily     2.  Keep the eyelid margins clean by using a commercial eye scrub or mild baby shampoo on a washcloth 1-2x daily    3. Use preservative free artificial tears 4-8x daily     For warm compresses    Moisten a washcloth with hot water, or microwave for 10 seconds, being careful to not get the cloth too hot.   Then put the washcloth onto your eyelids for 5 minutes. It will cool quickly so a rice pack or eyemask that can be heated and laid on top of the washcloth will help retain the heat.    Omega 3 fatty acid supplements taken once to twice daily and artificial tears such as Soothe xp, Refresh optive , Retaine and systane balance are also an additional treatment to control inflammation and help soothe your eyes.

## 2021-09-25 ENCOUNTER — HEALTH MAINTENANCE LETTER (OUTPATIENT)
Age: 71
End: 2021-09-25

## 2021-11-27 ENCOUNTER — TRANSFERRED RECORDS (OUTPATIENT)
Dept: HEALTH INFORMATION MANAGEMENT | Facility: CLINIC | Age: 71
End: 2021-11-27
Payer: COMMERCIAL

## 2021-12-18 ENCOUNTER — TRANSFERRED RECORDS (OUTPATIENT)
Dept: HEALTH INFORMATION MANAGEMENT | Facility: CLINIC | Age: 71
End: 2021-12-18
Payer: COMMERCIAL

## 2022-01-15 ENCOUNTER — HEALTH MAINTENANCE LETTER (OUTPATIENT)
Age: 72
End: 2022-01-15

## 2022-01-24 ENCOUNTER — TRANSFERRED RECORDS (OUTPATIENT)
Dept: HEALTH INFORMATION MANAGEMENT | Facility: CLINIC | Age: 72
End: 2022-01-24
Payer: COMMERCIAL

## 2022-02-09 ENCOUNTER — MYC MEDICAL ADVICE (OUTPATIENT)
Dept: INTERNAL MEDICINE | Facility: CLINIC | Age: 72
End: 2022-02-09
Payer: COMMERCIAL

## 2022-02-09 DIAGNOSIS — M19.90 ARTHRITIS: Primary | ICD-10-CM

## 2022-02-09 NOTE — TELEPHONE ENCOUNTER
Please see YouFetch message and advise. Patient has appointment scheduled with Dr. Sepulveda on 2/22/2022.     Patient requesting arthritis and rheumatology referral before appointment.   Routing to provider and covering providers. Thank you!    Appointments in Next Year        Feb 22, 2022  7:00 AM  (Arrive by 6:45 AM)  Annual Wellness Visit with Manuela Sepulveda MD  [facility] ([facility] ) [phone]        Lisa MORALES RN   [facility]

## 2022-02-11 NOTE — TELEPHONE ENCOUNTER
eCareer message sent to patient with referral information.     Lisa MORALES RN   [facility]

## 2022-02-16 DIAGNOSIS — F41.9 ANXIETY: ICD-10-CM

## 2022-02-16 RX ORDER — ESCITALOPRAM OXALATE 10 MG/1
TABLET ORAL
Qty: 90 TABLET | Refills: 0 | Status: SHIPPED | OUTPATIENT
Start: 2022-02-16 | End: 2022-03-14

## 2022-02-16 NOTE — TELEPHONE ENCOUNTER
Pending Prescriptions:                       Disp   Refills    escitalopram (LEXAPRO) 10 MG tablet [Phar*90 tab*0            Sig: TAKE 1 TABLET BY MOUTH EVERY DAY **DUE FOR APPT      Prescription approved per [facility] Refill Protocol.      Appointments in Next Year        Feb 22, 2022  7:00 AM  (Arrive by 6:45 AM)  Annual Wellness Visit with Manuela Sepulveda MD  [facility] ([facility] ) [phone]        Lisa MORALES RN   [facility]

## 2022-02-21 ENCOUNTER — ANCILLARY PROCEDURE (OUTPATIENT)
Dept: MAMMOGRAPHY | Facility: CLINIC | Age: 72
End: 2022-02-21
Attending: INTERNAL MEDICINE
Payer: COMMERCIAL

## 2022-02-21 DIAGNOSIS — Z12.31 VISIT FOR SCREENING MAMMOGRAM: ICD-10-CM

## 2022-02-21 PROCEDURE — 77067 SCR MAMMO BI INCL CAD: CPT | Mod: TC | Performed by: RADIOLOGY

## 2022-03-01 ENCOUNTER — TRANSFERRED RECORDS (OUTPATIENT)
Dept: HEALTH INFORMATION MANAGEMENT | Facility: CLINIC | Age: 72
End: 2022-03-01
Payer: COMMERCIAL

## 2022-03-01 LAB
ALT SERPL-CCNC: 16 IU/L (ref 5–35)
AST SERPL-CCNC: 24 U/L (ref 5–34)
CREATININE (EXTERNAL): 0.65 MG/DL (ref 0.5–1.3)

## 2022-03-12 ENCOUNTER — HEALTH MAINTENANCE LETTER (OUTPATIENT)
Age: 72
End: 2022-03-12

## 2022-03-14 ENCOUNTER — MYC REFILL (OUTPATIENT)
Dept: INTERNAL MEDICINE | Facility: CLINIC | Age: 72
End: 2022-03-14
Payer: COMMERCIAL

## 2022-03-14 DIAGNOSIS — F41.9 ANXIETY: ICD-10-CM

## 2022-03-16 RX ORDER — ESCITALOPRAM OXALATE 10 MG/1
10 TABLET ORAL DAILY
Qty: 90 TABLET | Refills: 0 | Status: SHIPPED | OUTPATIENT
Start: 2022-03-16 | End: 2022-07-13

## 2022-03-16 NOTE — TELEPHONE ENCOUNTER
Medication is being filled for 1 time refill only due to:  upcoming appt     Next 5 appointments (look out 90 days)    Jun 08, 2022  8:30 AM  (Arrive by 8:10 AM)  Annual Wellness Visit with Manuela Sepulveda MD  [facility] ([facility] ) [address] [facility] [address]  [phone]

## 2022-05-06 ENCOUNTER — TRANSFERRED RECORDS (OUTPATIENT)
Dept: HEALTH INFORMATION MANAGEMENT | Facility: CLINIC | Age: 72
End: 2022-05-06
Payer: COMMERCIAL

## 2022-06-22 ENCOUNTER — MYC MEDICAL ADVICE (OUTPATIENT)
Dept: INTERNAL MEDICINE | Facility: CLINIC | Age: 72
End: 2022-06-22

## 2022-07-06 ASSESSMENT — ENCOUNTER SYMPTOMS
MYALGIAS: 1
CONSTIPATION: 0
FEVER: 0
HEMATURIA: 0
DIZZINESS: 0
ARTHRALGIAS: 1
CHILLS: 0
PALPITATIONS: 0
HEMATOCHEZIA: 0
BREAST MASS: 0
SORE THROAT: 0
EYE PAIN: 0
NERVOUS/ANXIOUS: 0
PARESTHESIAS: 0
HEADACHES: 0
NAUSEA: 0
HEARTBURN: 0
DIARRHEA: 0
COUGH: 0
WEAKNESS: 0
FREQUENCY: 0
SHORTNESS OF BREATH: 0
ABDOMINAL PAIN: 0
DYSURIA: 0
JOINT SWELLING: 1

## 2022-07-06 ASSESSMENT — ACTIVITIES OF DAILY LIVING (ADL): CURRENT_FUNCTION: NO ASSISTANCE NEEDED

## 2022-07-13 ENCOUNTER — OFFICE VISIT (OUTPATIENT)
Dept: INTERNAL MEDICINE | Facility: CLINIC | Age: 72
End: 2022-07-13
Payer: COMMERCIAL

## 2022-07-13 VITALS
OXYGEN SATURATION: 99 % | BODY MASS INDEX: 29.63 KG/M2 | DIASTOLIC BLOOD PRESSURE: 74 MMHG | WEIGHT: 161 LBS | TEMPERATURE: 97 F | RESPIRATION RATE: 16 BRPM | SYSTOLIC BLOOD PRESSURE: 133 MMHG | HEIGHT: 62 IN

## 2022-07-13 DIAGNOSIS — Z00.00 PREVENTATIVE HEALTH CARE: Primary | ICD-10-CM

## 2022-07-13 LAB
BASOPHILS # BLD AUTO: 0 10E3/UL (ref 0–0.2)
BASOPHILS NFR BLD AUTO: 0 %
EOSINOPHIL # BLD AUTO: 0.1 10E3/UL (ref 0–0.7)
EOSINOPHIL NFR BLD AUTO: 2 %
ERYTHROCYTE [DISTWIDTH] IN BLOOD BY AUTOMATED COUNT: 13.1 % (ref 10–15)
HCT VFR BLD AUTO: 41.1 % (ref 35–47)
HGB BLD-MCNC: 13.6 G/DL (ref 11.7–15.7)
IMM GRANULOCYTES # BLD: 0 10E3/UL
IMM GRANULOCYTES NFR BLD: 0 %
LYMPHOCYTES # BLD AUTO: 2.1 10E3/UL (ref 0.8–5.3)
LYMPHOCYTES NFR BLD AUTO: 37 %
MCH RBC QN AUTO: 30.2 PG (ref 26.5–33)
MCHC RBC AUTO-ENTMCNC: 33.1 G/DL (ref 31.5–36.5)
MCV RBC AUTO: 91 FL (ref 78–100)
MONOCYTES # BLD AUTO: 0.4 10E3/UL (ref 0–1.3)
MONOCYTES NFR BLD AUTO: 6 %
NEUTROPHILS # BLD AUTO: 3.1 10E3/UL (ref 1.6–8.3)
NEUTROPHILS NFR BLD AUTO: 54 %
PLATELET # BLD AUTO: 219 10E3/UL (ref 150–450)
RBC # BLD AUTO: 4.5 10E6/UL (ref 3.8–5.2)
WBC # BLD AUTO: 5.7 10E3/UL (ref 4–11)

## 2022-07-13 PROCEDURE — 84443 ASSAY THYROID STIM HORMONE: CPT | Performed by: INTERNAL MEDICINE

## 2022-07-13 PROCEDURE — 80053 COMPREHEN METABOLIC PANEL: CPT | Performed by: INTERNAL MEDICINE

## 2022-07-13 PROCEDURE — 80061 LIPID PANEL: CPT | Performed by: INTERNAL MEDICINE

## 2022-07-13 PROCEDURE — G0439 PPPS, SUBSEQ VISIT: HCPCS | Performed by: INTERNAL MEDICINE

## 2022-07-13 PROCEDURE — 85025 COMPLETE CBC W/AUTO DIFF WBC: CPT | Performed by: INTERNAL MEDICINE

## 2022-07-13 PROCEDURE — 36415 COLL VENOUS BLD VENIPUNCTURE: CPT | Performed by: INTERNAL MEDICINE

## 2022-07-13 RX ORDER — HYDROCORTISONE ACETATE 0.5 %
CREAM (GRAM) TOPICAL
COMMUNITY
Start: 2022-07-13

## 2022-07-13 ASSESSMENT — ENCOUNTER SYMPTOMS
CHILLS: 0
NERVOUS/ANXIOUS: 0
DIZZINESS: 0
DIARRHEA: 0
EYE PAIN: 0
ARTHRALGIAS: 1
SORE THROAT: 0
NAUSEA: 0
PARESTHESIAS: 0
FEVER: 0
BREAST MASS: 0
WEAKNESS: 0
SHORTNESS OF BREATH: 0
MYALGIAS: 1
DYSURIA: 0
CONSTIPATION: 0
HEARTBURN: 0
HEMATOCHEZIA: 0
ABDOMINAL PAIN: 0
COUGH: 0
HEMATURIA: 0
HEADACHES: 0
PALPITATIONS: 0
JOINT SWELLING: 1
FREQUENCY: 0

## 2022-07-13 ASSESSMENT — ACTIVITIES OF DAILY LIVING (ADL): CURRENT_FUNCTION: NO ASSISTANCE NEEDED

## 2022-07-13 NOTE — PROGRESS NOTES
"SUBJECTIVE:   Lolis Mcdonald is a 72 year old female who presents for Preventive Visit.    Fasting. Saw rheum-arthritis. Saw ortho-rt shoulder.     Patient has been advised of split billing requirements and indicates understanding: Yes  Are you in the first 12 months of your Medicare coverage?  No    Healthy Habits:     In general, how would you rate your overall health?  Good    Frequency of exercise:  4-5 days/week    Duration of exercise:  Greater than 60 minutes    Do you usually eat at least 4 servings of fruit and vegetables a day, include whole grains    & fiber and avoid regularly eating high fat or \"junk\" foods?  Yes    Taking medications regularly:  Yes    Medication side effects:  None    Ability to successfully perform activities of daily living:  No assistance needed    Home Safety:  No safety concerns identified    Hearing Impairment:  No hearing concerns    In the past 6 months, have you been bothered by leaking of urine?  No    In general, how would you rate your overall mental or emotional health?  Excellent      PHQ-2 Total Score: 0    Additional concerns today:  No    Do you feel safe in your environment? Yes    Have you ever done Advance Care Planning? (For example, a Health Directive, POLST, or a discussion with a medical provider or your loved ones about your wishes): Yes, advance care planning is on file.       Fall risk  Fallen 2 or more times in the past year?: No  Any fall with injury in the past year?: No    Cognitive Screening   1) Repeat 3 items (Leader, Season, Table)    2) Clock draw: NORMAL  3) 3 item recall: Recalls 3 objects  Results: 3 items recalled: COGNITIVE IMPAIRMENT LESS LIKELY    Mini-CogTM Copyright TEDDY Croft. Licensed by the author for use in [facility]; reprinted with permission (helena@.[facility]). All rights reserved.      Do you have sleep apnea, excessive snoring or daytime drowsiness?: yes,     Reviewed and updated as needed this visit by clinical staff   " Tobacco  Allergies  Meds  Problems  Med Hx  Surg Hx  Fam Hx  Soc   Hx          Reviewed and updated as needed this visit by Provider                   Social History     Tobacco Use     Smoking status: Never Smoker     Smokeless tobacco: Never Used   Substance Use Topics     Alcohol use: No     Alcohol/week: 0.0 standard drinks     If you drink alcohol do you typically have >3 drinks per day or >7 drinks per week? No    Alcohol Use 7/13/2022   Prescreen: >3 drinks/day or >7 drinks/week? -   Prescreen: >3 drinks/day or >7 drinks/week? No       Current providers sharing in care for this patient include:   Patient Care Team:  Manuela Sepulveda MD as PCP - General (Internal Medicine)  Leland Thomas MD as Assigned Heart and Vascular Provider  Manuela Sepulveda MD as Assigned PCP  Lupe Hoff OD as Assigned Surgical Provider    The following health maintenance items are reviewed in Epic and correct as of today:  Health Maintenance Due   Topic Date Due     DEXA  10/09/2021     BP Readings from Last 3 Encounters:   07/13/22 133/74   03/04/21 108/59   02/02/21 116/77    Wt Readings from Last 3 Encounters:   07/13/22 73 kg (161 lb)   02/02/21 68.5 kg (151 lb)   11/15/19 68.5 kg (151 lb)                     Review of Systems   Constitutional: Negative for chills and fever.   HENT: Negative for congestion, ear pain, hearing loss and sore throat.    Eyes: Negative for pain and visual disturbance.   Respiratory: Negative for cough and shortness of breath.    Cardiovascular: Negative for chest pain, palpitations and peripheral edema.   Gastrointestinal: Negative for abdominal pain, constipation, diarrhea, heartburn, hematochezia and nausea.   Breasts:  Negative for tenderness, breast mass and discharge.   Genitourinary: Negative for dysuria, frequency, genital sores, hematuria, pelvic pain, urgency, vaginal bleeding and vaginal discharge.   Musculoskeletal: Positive for arthralgias, joint  "swelling and myalgias.   Skin: Negative for rash.   Neurological: Negative for dizziness, weakness, headaches and paresthesias.   Psychiatric/Behavioral: Negative for mood changes. The patient is not nervous/anxious.          OBJECTIVE:   /74   Temp 97  F (36.1  C) (Oral)   Resp 16   Ht 1.575 m (5' 2\")   Wt 73 kg (161 lb)   SpO2 99%   Breastfeeding No   BMI 29.45 kg/m   Estimated body mass index is 29.45 kg/m  as calculated from the following:    Height as of this encounter: 1.575 m (5' 2\").    Weight as of this encounter: 73 kg (161 lb).  Physical Exam  GENERAL: healthy, alert and no distress  EYES: Eyes grossly normal to inspection, PERRL and conjunctivae and sclerae normal  NECK: no adenopathy, no asymmetry, masses, or scars and thyroid normal to palpation  RESP: lungs clear to auscultation - no rales, rhonchi or wheezes  BREAST: normal without masses, tenderness or nipple discharge and no palpable axillary masses or adenopathy  CV: regular rate and rhythm, normal S1 S2, no S3 or S4, no murmur, click or rub, no peripheral edema and peripheral pulses strong  ABDOMEN: soft, nontender, no hepatosplenomegaly, no masses and bowel sounds normal  MS: no gross musculoskeletal defects noted, no edema  SKIN: no suspicious lesions or rashes  NEURO: Normal strength and tone, mentation intact and speech normal  PSYCH: mentation appears normal, affect normal/bright      ASSESSMENT / PLAN:   (Z00.00) Preventative health care  (primary encounter diagnosis)  Comment:    Plan: REVIEW OF HEALTH MAINTENANCE PROTOCOL ORDERS,         DEXA HIP/PELVIS/SPINE - Future,         Glucosamine-Chondroit-Vit C-Mn (GLUCOSAMINE         CHONDROITIN 1500 COMPLEX) CAPS, CBC with         platelets and differential, Comprehensive         metabolic panel (BMP + Alb, Alk Phos, ALT, AST,        Total. Bili, TP), TSH with free T4 reflex,         Lipid Profile (Chol, Trig, HDL, LDL calc)               COUNSELING:  Reviewed preventive health " "counseling, as reflected in patient instructions       Regular exercise       Healthy diet/nutrition    Estimated body mass index is 29.45 kg/m  as calculated from the following:    Height as of this encounter: 1.575 m (5' 2\").    Weight as of this encounter: 73 kg (161 lb).        She reports that she has never smoked. She has never used smokeless tobacco.      Appropriate preventive services were discussed with this patient, including applicable screening as appropriate for cardiovascular disease, diabetes, osteopenia/osteoporosis, and glaucoma.  As appropriate for age/gender, discussed screening for colorectal cancer, prostate cancer, breast cancer, and cervical cancer. Checklist reviewing preventive services available has been given to the patient.    Reviewed patients plan of care and provided an AVS. The Basic Care Plan (routine screening as documented in Health Maintenance) for Lolis meets the Care Plan requirement. This Care Plan has been established and reviewed with the Patient.    Counseling Resources:  ATP IV Guidelines  Pooled Cohorts Equation Calculator  Breast Cancer Risk Calculator  Breast Cancer: Medication to Reduce Risk  FRAX Risk Assessment  ICSI Preventive Guidelines  Dietary Guidelines for Americans, 2010  [employer]'s MyPlate  ASA Prophylaxis  Lung CA Screening    Manuela Sepulveda MD  [facility]    Identified Health Risks:  "

## 2022-07-14 LAB
ALBUMIN SERPL-MCNC: 3.8 G/DL (ref 3.4–5)
ALP SERPL-CCNC: 62 U/L (ref 40–150)
ALT SERPL W P-5'-P-CCNC: 21 U/L (ref 0–50)
ANION GAP SERPL CALCULATED.3IONS-SCNC: 3 MMOL/L (ref 3–14)
AST SERPL W P-5'-P-CCNC: 9 U/L (ref 0–45)
BILIRUB SERPL-MCNC: 0.4 MG/DL (ref 0.2–1.3)
BUN SERPL-MCNC: 18 MG/DL (ref 7–30)
CALCIUM SERPL-MCNC: 8.8 MG/DL (ref 8.5–10.1)
CHLORIDE BLD-SCNC: 106 MMOL/L (ref 94–109)
CHOLEST SERPL-MCNC: 224 MG/DL
CO2 SERPL-SCNC: 30 MMOL/L (ref 20–32)
CREAT SERPL-MCNC: 0.74 MG/DL (ref 0.52–1.04)
FASTING STATUS PATIENT QL REPORTED: YES
GFR SERPL CREATININE-BSD FRML MDRD: 86 ML/MIN/1.73M2
GLUCOSE BLD-MCNC: 86 MG/DL (ref 70–99)
HDLC SERPL-MCNC: 76 MG/DL
LDLC SERPL CALC-MCNC: 132 MG/DL
NONHDLC SERPL-MCNC: 148 MG/DL
POTASSIUM BLD-SCNC: 4.4 MMOL/L (ref 3.4–5.3)
PROT SERPL-MCNC: 7.1 G/DL (ref 6.8–8.8)
SODIUM SERPL-SCNC: 139 MMOL/L (ref 133–144)
TRIGL SERPL-MCNC: 82 MG/DL
TSH SERPL DL<=0.005 MIU/L-ACNC: 2.21 MU/L (ref 0.4–4)

## 2022-07-20 ENCOUNTER — ANCILLARY PROCEDURE (OUTPATIENT)
Dept: BONE DENSITY | Facility: CLINIC | Age: 72
End: 2022-07-20
Attending: INTERNAL MEDICINE
Payer: COMMERCIAL

## 2022-07-20 DIAGNOSIS — Z00.00 PREVENTATIVE HEALTH CARE: ICD-10-CM

## 2022-07-20 PROCEDURE — 77085 DXA BONE DENSITY AXL VRT FX: CPT | Performed by: INTERNAL MEDICINE

## 2022-08-30 ENCOUNTER — OFFICE VISIT (OUTPATIENT)
Dept: OPTOMETRY | Facility: CLINIC | Age: 72
End: 2022-08-30
Payer: COMMERCIAL

## 2022-08-30 DIAGNOSIS — H52.4 PRESBYOPIA: ICD-10-CM

## 2022-08-30 DIAGNOSIS — H26.9 BILATERAL INCIPIENT CATARACTS: ICD-10-CM

## 2022-08-30 DIAGNOSIS — H52.12 MYOPIA OF LEFT EYE: Primary | ICD-10-CM

## 2022-08-30 DIAGNOSIS — H02.889 MEIBOMIAN GLAND DYSFUNCTION: ICD-10-CM

## 2022-08-30 PROCEDURE — 92015 DETERMINE REFRACTIVE STATE: CPT | Performed by: OPTOMETRIST

## 2022-08-30 PROCEDURE — 92014 COMPRE OPH EXAM EST PT 1/>: CPT | Performed by: OPTOMETRIST

## 2022-08-30 ASSESSMENT — REFRACTION_MANIFEST
OS_CYLINDER: +0.50
OS_CYLINDER: +1.00
OD_CYLINDER: +0.75
OS_AXIS: 164
OD_SPHERE: -0.25
METHOD_AUTOREFRACTION: 1
OS_SPHERE: -2.25
OD_CYLINDER: +0.25
OS_SPHERE: -2.25
OD_AXIS: 010
OD_AXIS: 143
OS_AXIS: 142
OD_SPHERE: PLANO

## 2022-08-30 ASSESSMENT — CONF VISUAL FIELD
OS_NORMAL: 1
OD_NORMAL: 1
METHOD: COUNTING FINGERS

## 2022-08-30 ASSESSMENT — KERATOMETRY
OS_AXISANGLE_DEGREES: 76
OD_K1POWER_DIOPTERS: 44
OD_AXISANGLE_DEGREES: 98
OD_AXISANGLE2_DEGREES: 8
OD_K2POWER_DIOPTERS: 45.37
METHOD_AUTO_MANUAL: AUTOMATED
OS_AXISANGLE2_DEGREES: 166
OS_K2POWER_DIOPTERS: 47.37
OS_K1POWER_DIOPTERS: 45

## 2022-08-30 ASSESSMENT — EXTERNAL EXAM - LEFT EYE: OS_EXAM: NORMAL

## 2022-08-30 ASSESSMENT — VISUAL ACUITY
OD_SC: 20/25
OS_PH_SC+: -2
OS_SC: 20/30-1
OS_PH_SC: 20/20
OD_SC: 20/80
OS_SC: 20/50
METHOD: SNELLEN - LINEAR
OS_SC+: -2

## 2022-08-30 ASSESSMENT — CUP TO DISC RATIO
OS_RATIO: 0.45
OD_RATIO: 0.5

## 2022-08-30 ASSESSMENT — TONOMETRY
OS_IOP_MMHG: 15
OD_IOP_MMHG: 15
IOP_METHOD: APPLANATION

## 2022-08-30 ASSESSMENT — EXTERNAL EXAM - RIGHT EYE: OD_EXAM: NORMAL

## 2022-08-30 NOTE — PATIENT INSTRUCTIONS
Consider single vision distance and reading glasses so both eyes are clear at distance viewed    Cataracts slightly worse but vision remains good at 20/25    Meibomian gland dysfunction or Posterior Blepharitis, is characterized by inflammation along both the uppper and lower eyelid margins. A single row of these glands is present in each lid with openings along the lid margins.  It is often found in association with skin conditions such as rosacea and seborrheic dermatitis.    Symptoms include:  ?Red eyes  ?Gritty or burning sensation  ?Excessive tearing  ?Itchy eyelids  ?Red, swollen eyelids  ?Crusting or matting of eyelashes in the morning  ?Light sensitivity  ?Blurred vision    It is important to keep cosmetics from blocking these oil glands. If blocked, they do not   excrete oil into the tear film, which causes the tears to evaporate quickly.   This may result in watery eyes.  There is also an increase of bacterial growth when the tear film is unstable, leading to further ocular surface inflammation.    Treatment:  1. Warm compresses for 5-10 minutes twice daily     2.  Keep the eyelid margins clean by using a commercial eye scrub or mild baby shampoo on a washcloth 1-2x daily    3. Use preservative free artificial tears 4-8x daily     For warm compresses    Moisten a washcloth with hot water, or microwave for 10 seconds, being careful to not get the cloth too hot.   Then put the washcloth onto your eyelids for 5 minutes. It will cool quickly so a rice pack or eyemask that can be heated and laid on top of the washcloth will help retain the heat.    Omega 3 fatty acid supplements taken once to twice daily and artificial tears such as Soothe xp, Refresh optive , Retaine and systane balance are also an additional treatment to control inflammation and help soothe your eyes.

## 2022-08-30 NOTE — LETTER
8/30/2022         RE: Lolis Mcdonald  [address]  [place] [address]        Dear Colleague,    Thank you for referring your patient, Lolis Mcdonald, to the [facility]. Please see a copy of my visit note below.    Chief Complaint   Patient presents with     Annual Eye Exam        Last Eye Exam: 05/13/2021  Dilated Previously: Yes, side effects of dilation explained today    What are you currently using to see?  does not use glasses or contacts       Distance Vision Acuity: Satisfied with vision    Near Vision Acuity: Satisfied with vision while reading and using computer unaided    Eye Comfort: watery  Do you use eye drops? : No      Yumiko Gardner - Optometric Assistant           Medical, surgical and family histories reviewed and updated 8/30/2022.       OBJECTIVE: See Ophthalmology exam    ASSESSMENT:    ICD-10-CM    1. Myopia of left eye  H52.12    2. Presbyopia  H52.4    3. Bilateral incipient cataracts  H26.9    4. 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        Sincerely,        Lupe Hoff, OD

## 2022-08-30 NOTE — PROGRESS NOTES
Chief Complaint   Patient presents with     Annual Eye Exam        Last Eye Exam: 05/13/2021  Dilated Previously: Yes, side effects of dilation explained today    What are you currently using to see?  does not use glasses or contacts       Distance Vision Acuity: Satisfied with vision    Near Vision Acuity: Satisfied with vision while reading and using computer unaided    Eye Comfort: watery  Do you use eye drops? : No      Yumiko Gardner - Optometric Assistant           Medical, surgical and family histories reviewed and updated 8/30/2022.       OBJECTIVE: See Ophthalmology exam    ASSESSMENT:    ICD-10-CM    1. Myopia of left eye  H52.12    2. Presbyopia  H52.4    3. Bilateral incipient cataracts  H26.9    4. 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

## 2022-09-29 ENCOUNTER — IMMUNIZATION (OUTPATIENT)
Dept: PEDIATRICS | Facility: CLINIC | Age: 72
End: 2022-09-29
Payer: COMMERCIAL

## 2022-09-29 DIAGNOSIS — Z23 NEED FOR PROPHYLACTIC VACCINATION AND INOCULATION AGAINST INFLUENZA: ICD-10-CM

## 2022-09-29 DIAGNOSIS — Z23 HIGH PRIORITY FOR 2019-NCOV VACCINE: ICD-10-CM

## 2022-09-29 PROCEDURE — G0008 ADMIN INFLUENZA VIRUS VAC: HCPCS

## 2022-09-29 PROCEDURE — 91313 COVID-19,PF,MODERNA BIVALENT: CPT

## 2022-09-29 PROCEDURE — 0134A COVID-19,PF,MODERNA BIVALENT: CPT

## 2022-09-29 PROCEDURE — 90662 IIV NO PRSV INCREASED AG IM: CPT

## 2023-05-18 ENCOUNTER — TELEPHONE (OUTPATIENT)
Dept: OPTOMETRY | Facility: CLINIC | Age: 73
End: 2023-05-18
Payer: COMMERCIAL

## 2023-05-18 NOTE — TELEPHONE ENCOUNTER
Spoke with Lolis, She is noticing what she believes the cataract is getting worse and would like to see a surgeon.  Her vision was 20/25 last August, so should be evaluated.  Schedule a cataract check, if it is a change in refraction, she has this scheduled for August, cancel if she needs a CE referral.    Lupe Hoff OD

## 2023-06-21 ENCOUNTER — TRANSFERRED RECORDS (OUTPATIENT)
Dept: HEALTH INFORMATION MANAGEMENT | Facility: CLINIC | Age: 73
End: 2023-06-21

## 2023-06-21 ENCOUNTER — ALLIED HEALTH/NURSE VISIT (OUTPATIENT)
Dept: OPTOMETRY | Facility: CLINIC | Age: 73
End: 2023-06-21
Payer: COMMERCIAL

## 2023-06-21 DIAGNOSIS — H52.4 PRESBYOPIA: ICD-10-CM

## 2023-06-21 DIAGNOSIS — H25.813 MIXED TYPE AGE-RELATED CATARACT, BOTH EYES: ICD-10-CM

## 2023-06-21 DIAGNOSIS — H52.12 MYOPIA OF LEFT EYE: Primary | ICD-10-CM

## 2023-06-21 DIAGNOSIS — Z98.890 S/P LASIK SURGERY: ICD-10-CM

## 2023-06-21 PROCEDURE — 92012 INTRM OPH EXAM EST PATIENT: CPT | Performed by: OPTOMETRIST

## 2023-06-21 ASSESSMENT — VISUAL ACUITY
OD_PH_SC+: -2
OS_PH_SC: 20/40
OD_PH_SC: 20/25
OD_SC: 20/30
OS_SC: 20/80
METHOD: SNELLEN - LINEAR
OS_PH_SC+: -2

## 2023-06-21 ASSESSMENT — REFRACTION_MANIFEST
OD_AXIS: 010
OD_CYLINDER: +0.25
OS_SPHERE: -2.50
OD_SPHERE: PLANO
OS_CYLINDER: +0.50
OS_AXIS: 142

## 2023-06-21 ASSESSMENT — CUP TO DISC RATIO
OD_RATIO: 0.5
OS_RATIO: 0.45

## 2023-06-21 ASSESSMENT — EXTERNAL EXAM - LEFT EYE: OS_EXAM: NORMAL

## 2023-06-21 ASSESSMENT — EXTERNAL EXAM - RIGHT EYE: OD_EXAM: NORMAL

## 2023-06-21 NOTE — PROGRESS NOTES
Chief Complaint   Patient presents with     Cataract Evaluation   Patient wants to know if her cataracts are ready for surgery. She states her distance vision has gotten worse since the beginning of the years. She has 3 year old driving glasses that she started using again and feels like she is relying on them more. She states the vision is better in the glasses but not great..  Needs to look to the side sometimes and does not feel safe driving especially at night     She only filled SV reading glasses with us last year    Do you wear contact lenses? No        Yumiko Gardner - Optometric Assistant      See Review Of Systems       Medical, surgical and family histories reviewed and updated 6/21/2023.         OBJECTIVE: See Ophthalmology exam    ASSESSMENT:    ICD-10-CM    1. Myopia of left eye  H52.12       2. Presbyopia  H52.4       3. Mixed type age-related cataract, both eyes  H25.813       4. S/P LASIK surgery  Z98.890          PLAN:  Refer to [facility] for CE consult mono vision   L distance   She will keep her scheduled exam appointment and cancel if she does not need it.    Lupe Hoff OD

## 2023-06-26 ENCOUNTER — TRANSFERRED RECORDS (OUTPATIENT)
Dept: HEALTH INFORMATION MANAGEMENT | Facility: CLINIC | Age: 73
End: 2023-06-26
Payer: COMMERCIAL

## 2023-07-10 ENCOUNTER — TELEPHONE (OUTPATIENT)
Dept: PEDIATRICS | Facility: CLINIC | Age: 73
End: 2023-07-10
Payer: COMMERCIAL

## 2023-07-10 NOTE — TELEPHONE ENCOUNTER
Reason for Call:  Appointment Request    Patient requesting this type of appt:  Preventive     Requested provider: any provider    Reason patient unable to be scheduled: Not within requested timeframe    When does patient want to be seen/preferred time: 1-2 weeks    Comments: Sooner appt if available. Appt scheduled 09/14/2023    Could we send this information to you in [employer] or would you prefer to receive a phone call?:   Patient would prefer a phone call   Okay to leave a detailed message?: Yes at Cell number on file:    Telephone Information:   Mobile [phone]       Call taken on 7/10/2023 at 5:09 PM by AMOL LOU

## 2023-07-11 ENCOUNTER — ANCILLARY PROCEDURE (OUTPATIENT)
Dept: MAMMOGRAPHY | Facility: CLINIC | Age: 73
End: 2023-07-11
Attending: INTERNAL MEDICINE
Payer: COMMERCIAL

## 2023-07-11 ENCOUNTER — ANCILLARY ORDERS (OUTPATIENT)
Dept: PEDIATRICS | Facility: CLINIC | Age: 73
End: 2023-07-11

## 2023-07-11 ENCOUNTER — ANCILLARY ORDERS (OUTPATIENT)
Dept: MAMMOGRAPHY | Facility: CLINIC | Age: 73
End: 2023-07-11

## 2023-07-11 DIAGNOSIS — Z12.31 VISIT FOR SCREENING MAMMOGRAM: ICD-10-CM

## 2023-07-11 PROCEDURE — 77063 BREAST TOMOSYNTHESIS BI: CPT | Mod: TC | Performed by: RADIOLOGY

## 2023-07-11 PROCEDURE — 77067 SCR MAMMO BI INCL CAD: CPT | Mod: TC | Performed by: RADIOLOGY

## 2023-07-11 NOTE — TELEPHONE ENCOUNTER
The pt is aware and scheduled for her upcoming appointment.   Martha Deluca on 7/11/2023 at 7:33 AM

## 2023-07-20 ENCOUNTER — OFFICE VISIT (OUTPATIENT)
Dept: PEDIATRICS | Facility: CLINIC | Age: 73
End: 2023-07-20
Payer: COMMERCIAL

## 2023-07-20 VITALS
HEART RATE: 87 BPM | OXYGEN SATURATION: 95 % | BODY MASS INDEX: 29.72 KG/M2 | HEIGHT: 62 IN | WEIGHT: 161.5 LBS | RESPIRATION RATE: 18 BRPM | TEMPERATURE: 98 F | DIASTOLIC BLOOD PRESSURE: 66 MMHG | SYSTOLIC BLOOD PRESSURE: 116 MMHG

## 2023-07-20 DIAGNOSIS — H25.9 AGE-RELATED CATARACT OF BOTH EYES, UNSPECIFIED AGE-RELATED CATARACT TYPE: ICD-10-CM

## 2023-07-20 DIAGNOSIS — Z01.818 PREOP GENERAL PHYSICAL EXAM: Primary | ICD-10-CM

## 2023-07-20 PROCEDURE — 99214 OFFICE O/P EST MOD 30 MIN: CPT | Performed by: PHYSICIAN ASSISTANT

## 2023-07-20 ASSESSMENT — PAIN SCALES - GENERAL: PAINLEVEL: NO PAIN (0)

## 2023-07-20 NOTE — PROGRESS NOTES
[facility]  [address]  [address]  [place] [address]  Phone: [phone]  Fax: [phone]  Primary Provider: Manuela Sepulveda  Pre-op Performing Provider: CHANA MENENDEZ      PREOPERATIVE EVALUATION:  Today's date: 7/20/2023    Lolis Mcdonald is a 73 year old female who presents for a preoperative evaluation.      7/20/2023    10:01 AM   Additional Questions   Roomed by Antonella Hoff CMA     Forms 7/20/2023   Any forms needing to be completed Yes     Surgical Information:  Surgery/Procedure: cataract surgery  Surgery Location: [facility] [place]  Surgeon: RYAN ALMODOVAR MD  Surgery Date: 8/8/23  Time of Surgery: 1:00pm  Where patient plans to recover: At home with family  Fax number for surgical facility: [phone]    Assessment & Plan     The proposed surgical procedure is considered LOW risk.     Diagnosis Comments   1. Preop general physical exam  No labs or ekg needed      2. Age-related cataract of both eyes, unspecified age-related cataract type                    - No identified additional risk factors other than previously addressed    Antiplatelet or Anticoagulation Medication Instructions:   - Patient is on no antiplatelet or anticoagulation medications.    Additional Medication Instructions:  Patient is on no additional chronic medications    RECOMMENDATION:  APPROVAL GIVEN to proceed with proposed procedure, without further diagnostic evaluation.            Subjective       HPI related to upcoming procedure: cataracts   Right eye on 8/7/23 and left eye on 8/16/23 7/20/2023    10:00 AM   Preop Questions   1. Have you ever had a heart attack or stroke? No   2. Have you ever had surgery on your heart or blood vessels, such as a stent placement, a coronary artery bypass, or surgery on an artery in your head, neck, heart, or legs? YES - vein stripping in legs   3. Do you have chest pain with activity? No   4. Do you have  a history of  heart failure? No   5. Do you currently have a cold, bronchitis or symptoms of other infection? No   6. Do you have a cough, shortness of breath, or wheezing? No   7. Do you or anyone in your family have previous history of blood clots? YES - sister had breast cancer, had clot associated    8. Do you or does anyone in your family have a serious bleeding problem such as prolonged bleeding following surgeries or cuts? No   9. Have you ever had problems with anemia or been told to take iron pills? No   10. Have you had any abnormal blood loss such as black, tarry or bloody stools, or abnormal vaginal bleeding? No   11. Have you ever had a blood transfusion? No   12. Are you willing to have a blood transfusion if it is medically needed before, during, or after your surgery? Yes   13. Have you or any of your relatives ever had problems with anesthesia? No   14. Do you have sleep apnea, excessive snoring or daytime drowsiness? No   15. Do you have any artifical heart valves or other implanted medical devices like a pacemaker, defibrillator, or continuous glucose monitor? No   16. Do you have artificial joints? YES - titanium plate in neck and right hip   17. Are you allergic to latex? No       Health Care Directive:  Patient does not have a Health Care Directive or Living Will: Discussed advance care planning with patient; however, patient declined at this time.    Preoperative Review of :   reviewed - no record of controlled substances prescribed.          Review of Systems  CONSTITUTIONAL: NEGATIVE for fever, chills, change in weight  INTEGUMENTARY/SKIN: NEGATIVE for worrisome rashes, moles or lesions  EYES: NEGATIVE for vision changes or irritation  ENT/MOUTH: NEGATIVE for ear, mouth and throat problems  RESP: NEGATIVE for significant cough or SOB  CV: NEGATIVE for chest pain, palpitations or peripheral edema  GI: NEGATIVE for nausea, abdominal pain, heartburn, or change in bowel habits  :  NEGATIVE for frequency, dysuria, or hematuria  MUSCULOSKELETAL: NEGATIVE for significant arthralgias or myalgia  NEURO: NEGATIVE for weakness, dizziness or paresthesias  ENDOCRINE: NEGATIVE for temperature intolerance, skin/hair changes  HEME: NEGATIVE for bleeding problems  PSYCHIATRIC: NEGATIVE for changes in mood or affect    Patient Active Problem List    Diagnosis Date Noted     Squamous cell skin cancer 10/09/2018     Priority: Medium     WENDY (obstructive sleep apnea) 07/31/2017     Priority: Medium     Dry mouth 10/03/2016     Priority: Medium     Advanced directives, counseling/discussion 08/17/2011     Priority: Medium     Patient states has Advance Directive and will bring in a copy to clinic.         CARDIOVASCULAR SCREENING; LDL GOAL LESS THAN 160 10/31/2010     Priority: Medium      Past Medical History:   Diagnosis Date     Arthritis      Chronic pain     sciatic pain left leg to toes     Numbness and tingling     left leg     PONV (postoperative nausea and vomiting)     hx nausea postop     Past Surgical History:   Procedure Laterality Date     COLONOSCOPY N/A 3/4/2021    Procedure: COLONOSCOPY;  Surgeon: Gerard Siddiqi MD;  Location:  GI     ENHANCE LASER REFRACTIVE BILATERAL EXISTING PT IN PARAMETERS       HYSTERECTOMY, PAP NO LONGER INDICATED       SEPTOPLASTY, TURBINOPLASTY, COMBINED  1/20/2012    Procedure:COMBINED SEPTOPLASTY, TURBINOPLASTY; SEPTOPLASTY, TURBINATE Surgery ; Surgeon:OPAL FUENTES; Location: OR     TONSILLECTOMY & ADENOIDECTOMY  age 6     [facility] NONSPECIFIC PROCEDURE  2003    cervical spine surgery      [facility] NONSPECIFIC PROCEDURE      varacose carroll removal right     Current Outpatient Medications   Medication Sig Dispense Refill     Calcium Carb-Cholecalciferol (CALCIUM 1000 + D) 1000-800 MG-UNIT TABS        cetirizine (ZYRTEC) 10 MG tablet Take 1 tablet (10 mg) by mouth every evening 30 tablet 1     Glucosamine-Chondroit-Vit C-Mn (GLUCOSAMINE CHONDROITIN 1500 COMPLEX)  "CAPS        magnesium gluconate (MAGONATE) 500 (27 Mg) MG tablet Take 1 tablet (500 mg) by mouth 2 times daily       melatonin 5 MG tablet Take 1 tablet (5 mg) by mouth nightly as needed for sleep       Turmeric 500 MG CAPS        vitamin D3 (CHOLECALCIFEROL) 50 mcg (2000 units) tablet Take 1 tablet (50 mcg) by mouth daily       omega 3 1000 MG CAPS        vitamin C (ASCORBIC ACID) 100 MG tablet Take 100 mg by mouth 3 times daily       Zinc 25 MG TABS          Allergies   Allergen Reactions     Doxycycline      Itchy rash     Dust Mites         Social History     Tobacco Use     Smoking status: Never     Smokeless tobacco: Never   Substance Use Topics     Alcohol use: No     Alcohol/week: 0.0 standard drinks of alcohol       History   Drug Use No         Objective     /66 (BP Location: Right arm, Cuff Size: Adult Regular)   Pulse 87   Temp 98  F (36.7  C) (Tympanic)   Resp 18   Ht 1.575 m (5' 2\")   Wt 73.3 kg (161 lb 8 oz)   SpO2 95%   BMI 29.54 kg/m      Physical Exam    GENERAL APPEARANCE: healthy, alert and no distress     EYES: EOMI, PERRL     HENT: ear canals and TM's normal and nose and mouth without ulcers or lesions     NECK: no adenopathy, no asymmetry, masses, or scars and thyroid normal to palpation     RESP: lungs clear to auscultation - no rales, rhonchi or wheezes     CV: regular rates and rhythm, normal S1 S2, no S3 or S4 and no murmur, click or rub     MS: extremities normal- no gross deformities noted, no evidence of inflammation in joints, FROM in all extremities.     SKIN: no suspicious lesions or rashes     NEURO: Normal strength and tone, sensory exam grossly normal, mentation intact and speech normal     PSYCH: mentation appears normal. and affect normal/bright     LYMPHATICS: No cervical adenopathy    Recent Labs   Lab Test 07/13/22  0816   HGB 13.6         POTASSIUM 4.4   CR 0.74        Diagnostics:  No labs were ordered during this visit.   No EKG required for low " risk surgery (cataract, skin procedure, breast biopsy, etc).    Revised Cardiac Risk Index (RCRI):  The patient has the following serious cardiovascular risks for perioperative complications:   - No serious cardiac risks = 0 points     RCRI Interpretation: 0 points: Class I (very low risk - 0.4% complication rate)           Signed Electronically by: Edyta Degroot PA-C  Copy of this evaluation report is provided to requesting physician.

## 2023-07-20 NOTE — PATIENT INSTRUCTIONS
For informational purposes only. Not to replace the advice of your health care provider. Copyright   2003,  [employer] [facility]. All rights reserved. Clinically reviewed by Janet Min MD. goC[employer] [id number] - REV .  Preparing for Your Surgery  Getting started  A nurse will call you to review your health history and instructions. They will give you an arrival time based on your scheduled surgery time. Please be ready to share:  Your doctor's clinic name and phone number  Your medical, surgical, and anesthesia history  A list of allergies and sensitivities  A list of medicines, including herbal treatments and over-the-counter drugs  Whether the patient has a legal guardian (ask how to send us the papers in advance)  Please tell us if you're pregnant--or if there's any chance you might be pregnant. Some surgeries may injure a fetus (unborn baby), so they require a pregnancy test. Surgeries that are safe for a fetus don't always need a test, and you can choose whether to have one.   If you have a child who's having surgery, please ask for a copy of Preparing for Your Child's Surgery.    Preparing for surgery  Within 10 to 30 days of surgery: Have a pre-op exam (sometimes called an H&P, or History and Physical). This can be done at a clinic or pre-operative center.  If you're having a , you may not need this exam. Talk to your care team.  At your pre-op exam, talk to your care team about all medicines you take. If you need to stop any medicines before surgery, ask when to start taking them again.  We do this for your safety. Many medicines can make you bleed too much during surgery. Some change how well surgery (anesthesia) drugs work.  Call your insurance company to let them know you're having surgery. (If you don't have insurance, call [phone].)  Call your clinic if there's any change in your health. This includes signs of a cold or flu (sore throat, runny nose, cough, rash, fever). It  also includes a scrape or scratch near the surgery site.  If you have questions on the day of surgery, call your hospital or surgery center.  Eating and drinking guidelines  For your safety: Unless your surgeon tells you otherwise, follow the guidelines below.  Eat and drink as usual until 8 hours before you arrive for surgery. After that, no food or milk.  Drink clear liquids until 2 hours before you arrive. These are liquids you can see through, like water, Gatorade, and Propel Water. They also include plain black coffee and tea (no cream or milk), candy, and breath mints. You can spit out gum when you arrive.  If you drink alcohol: Stop drinking it the night before surgery.  If your care team tells you to take medicine on the morning of surgery, it's okay to take it with a sip of water.  Preventing infection  Shower or bathe the night before and morning of your surgery. Follow the instructions your clinic gave you. (If no instructions, use regular soap.)  Don't shave or clip hair near your surgery site. We'll remove the hair if needed.  Don't smoke or vape the morning of surgery. You may chew nicotine gum up to 2 hours before surgery. A nicotine patch is okay.  Note: Some surgeries require you to completely quit smoking and nicotine. Check with your surgeon.  Your care team will make every effort to keep you safe from infection. We will:  Clean our hands often with soap and water (or an alcohol-based hand rub).  Clean the skin at your surgery site with a special soap that kills germs.  Give you a special gown to keep you warm. (Cold raises the risk of infection.)  Wear special hair covers, masks, gowns and gloves during surgery.  Give antibiotic medicine, if prescribed. Not all surgeries need antibiotics.  What to bring on the day of surgery  Photo ID and insurance card  Copy of your health care directive, if you have one  Glasses and hearing aids (bring cases)  You can't wear contacts during surgery  Inhaler and  eye drops, if you use them (tell us about these when you arrive)  CPAP machine or breathing device, if you use them  A few personal items, if spending the night  If you have . . .  A pacemaker, ICD (cardiac defibrillator) or other implant: Bring the ID card.  An implanted stimulator: Bring the remote control.  A legal guardian: Bring a copy of the certified (court-stamped) guardianship papers.  Please remove any jewelry, including body piercings. Leave jewelry and other valuables at home.  If you're going home the day of surgery  You must have a responsible adult drive you home. They should stay with you overnight as well.  If you don't have someone to stay with you, and you aren't safe to go home alone, we may keep you overnight. Insurance often won't pay for this.  After surgery  If it's hard to control your pain or you need more pain medicine, please call your surgeon's office.  Questions?   If you have any questions for your care team, list them here: _________________________________________________________________________________________________________________________________________________________________________ ____________________________________ ____________________________________ ____________________________________    How to Take Your Medication Before Surgery  - Take all of your medications before surgery as usual    For informational purposes only. Not to replace the advice of your health care provider. Copyright   2003, 2019 [employer]. All rights reserved. Clinically reviewed by Janet Min MD. [employer] [id number] - REV 12/22.  Preparing for Your Surgery  Getting started  A nurse will call you to review your health history and instructions. They will give you an arrival time based on your scheduled surgery time. Please be ready to share:  Your doctor's clinic name and phone number  Your medical, surgical, and anesthesia history  A list of allergies and sensitivities  A list of  medicines, including herbal treatments and over-the-counter drugs  Whether the patient has a legal guardian (ask how to send us the papers in advance)  Please tell us if you're pregnant--or if there's any chance you might be pregnant. Some surgeries may injure a fetus (unborn baby), so they require a pregnancy test. Surgeries that are safe for a fetus don't always need a test, and you can choose whether to have one.   If you have a child who's having surgery, please ask for a copy of Preparing for Your Child's Surgery.    Preparing for surgery  Within 10 to 30 days of surgery: Have a pre-op exam (sometimes called an H&P, or History and Physical). This can be done at a clinic or pre-operative center.  If you're having a , you may not need this exam. Talk to your care team.  At your pre-op exam, talk to your care team about all medicines you take. If you need to stop any medicines before surgery, ask when to start taking them again.  We do this for your safety. Many medicines can make you bleed too much during surgery. Some change how well surgery (anesthesia) drugs work.  Call your insurance company to let them know you're having surgery. (If you don't have insurance, call [phone].)  Call your clinic if there's any change in your health. This includes signs of a cold or flu (sore throat, runny nose, cough, rash, fever). It also includes a scrape or scratch near the surgery site.  If you have questions on the day of surgery, call your hospital or surgery center.  Eating and drinking guidelines  For your safety: Unless your surgeon tells you otherwise, follow the guidelines below.  Eat and drink as usual until 8 hours before you arrive for surgery. After that, no food or milk.  Drink clear liquids until 2 hours before you arrive. These are liquids you can see through, like water, Gatorade, and Propel Water. They also include plain black coffee and tea (no cream or milk), candy, and breath mints. You can  spit out gum when you arrive.  If you drink alcohol: Stop drinking it the night before surgery.  If your care team tells you to take medicine on the morning of surgery, it's okay to take it with a sip of water.  Preventing infection  Shower or bathe the night before and morning of your surgery. Follow the instructions your clinic gave you. (If no instructions, use regular soap.)  Don't shave or clip hair near your surgery site. We'll remove the hair if needed.  Don't smoke or vape the morning of surgery. You may chew nicotine gum up to 2 hours before surgery. A nicotine patch is okay.  Note: Some surgeries require you to completely quit smoking and nicotine. Check with your surgeon.  Your care team will make every effort to keep you safe from infection. We will:  Clean our hands often with soap and water (or an alcohol-based hand rub).  Clean the skin at your surgery site with a special soap that kills germs.  Give you a special gown to keep you warm. (Cold raises the risk of infection.)  Wear special hair covers, masks, gowns and gloves during surgery.  Give antibiotic medicine, if prescribed. Not all surgeries need antibiotics.  What to bring on the day of surgery  Photo ID and insurance card  Copy of your health care directive, if you have one  Glasses and hearing aids (bring cases)  You can't wear contacts during surgery  Inhaler and eye drops, if you use them (tell us about these when you arrive)  CPAP machine or breathing device, if you use them  A few personal items, if spending the night  If you have . . .  A pacemaker, ICD (cardiac defibrillator) or other implant: Bring the ID card.  An implanted stimulator: Bring the remote control.  A legal guardian: Bring a copy of the certified (court-stamped) guardianship papers.  Please remove any jewelry, including body piercings. Leave jewelry and other valuables at home.  If you're going home the day of surgery  You must have a responsible adult drive you home.  They should stay with you overnight as well.  If you don't have someone to stay with you, and you aren't safe to go home alone, we may keep you overnight. Insurance often won't pay for this.  After surgery  If it's hard to control your pain or you need more pain medicine, please call your surgeon's office.  Questions?   If you have any questions for your care team, list them here: _________________________________________________________________________________________________________________________________________________________________________ ____________________________________ ____________________________________ ____________________________________    How to Take Your Medication Before Surgery  - Take all of your medications before surgery as usual     complains of pain/discomfort

## 2023-09-12 ENCOUNTER — HOSPITAL ENCOUNTER (EMERGENCY)
Facility: CLINIC | Age: 73
Discharge: HOME | End: 2023-09-13
Attending: EMERGENCY MEDICINE | Admitting: EMERGENCY MEDICINE
Payer: COMMERCIAL

## 2023-09-12 ENCOUNTER — APPOINTMENT (OUTPATIENT)
Dept: CT IMAGING | Facility: CLINIC | Age: 73
End: 2023-09-12
Attending: EMERGENCY MEDICINE
Payer: COMMERCIAL

## 2023-09-12 ENCOUNTER — OFFICE VISIT (OUTPATIENT)
Dept: URGENT CARE | Facility: URGENT CARE | Age: 73
End: 2023-09-12
Payer: COMMERCIAL

## 2023-09-12 VITALS
OXYGEN SATURATION: 97 % | DIASTOLIC BLOOD PRESSURE: 79 MMHG | SYSTOLIC BLOOD PRESSURE: 125 MMHG | BODY MASS INDEX: 28.34 KG/M2 | HEIGHT: 62 IN | WEIGHT: 154 LBS | TEMPERATURE: 98.6 F | HEART RATE: 86 BPM

## 2023-09-12 DIAGNOSIS — R42 DIZZINESS: ICD-10-CM

## 2023-09-12 DIAGNOSIS — R51.9 NONINTRACTABLE HEADACHE, UNSPECIFIED CHRONICITY PATTERN, UNSPECIFIED HEADACHE TYPE: ICD-10-CM

## 2023-09-12 DIAGNOSIS — R51.9 ACUTE NONINTRACTABLE HEADACHE, UNSPECIFIED HEADACHE TYPE: ICD-10-CM

## 2023-09-12 DIAGNOSIS — R63.4 WEIGHT LOSS: ICD-10-CM

## 2023-09-12 DIAGNOSIS — R42 DIZZINESS: Primary | ICD-10-CM

## 2023-09-12 DIAGNOSIS — U07.1 INFECTION DUE TO 2019 NOVEL CORONAVIRUS: ICD-10-CM

## 2023-09-12 DIAGNOSIS — R53.83 FATIGUE, UNSPECIFIED TYPE: ICD-10-CM

## 2023-09-12 LAB
ALBUMIN SERPL BCG-MCNC: 4.1 G/DL (ref 3.5–5.2)
ALBUMIN UR-MCNC: NEGATIVE MG/DL
ALP SERPL-CCNC: 73 U/L (ref 35–104)
ALT SERPL W P-5'-P-CCNC: 21 U/L (ref 0–50)
ANION GAP SERPL CALCULATED.3IONS-SCNC: 8 MMOL/L (ref 7–15)
APPEARANCE UR: CLEAR
AST SERPL W P-5'-P-CCNC: 22 U/L (ref 0–45)
BASOPHILS # BLD AUTO: 0 10E3/UL (ref 0–0.2)
BASOPHILS NFR BLD AUTO: 0 %
BILIRUB DIRECT SERPL-MCNC: <0.2 MG/DL (ref 0–0.3)
BILIRUB SERPL-MCNC: 0.2 MG/DL
BILIRUB UR QL STRIP: NEGATIVE
BUN SERPL-MCNC: 22.8 MG/DL (ref 8–23)
CALCIUM SERPL-MCNC: 9.2 MG/DL (ref 8.8–10.2)
CHLORIDE SERPL-SCNC: 100 MMOL/L (ref 98–107)
COLOR UR AUTO: ABNORMAL
CREAT SERPL-MCNC: 0.86 MG/DL (ref 0.51–0.95)
DEPRECATED HCO3 PLAS-SCNC: 27 MMOL/L (ref 22–29)
EGFRCR SERPLBLD CKD-EPI 2021: 71 ML/MIN/1.73M2
EOSINOPHIL # BLD AUTO: 0.1 10E3/UL (ref 0–0.7)
EOSINOPHIL NFR BLD AUTO: 1 %
ERYTHROCYTE [DISTWIDTH] IN BLOOD BY AUTOMATED COUNT: 13.2 % (ref 10–15)
GLUCOSE SERPL-MCNC: 95 MG/DL (ref 70–99)
GLUCOSE UR STRIP-MCNC: NEGATIVE MG/DL
HCT VFR BLD AUTO: 44.4 % (ref 35–47)
HGB BLD-MCNC: 14.6 G/DL (ref 11.7–15.7)
HGB UR QL STRIP: NEGATIVE
HOLD SPECIMEN: NORMAL
HOLD SPECIMEN: NORMAL
IMM GRANULOCYTES # BLD: 0.1 10E3/UL
IMM GRANULOCYTES NFR BLD: 1 %
KETONES UR STRIP-MCNC: NEGATIVE MG/DL
LEUKOCYTE ESTERASE UR QL STRIP: NEGATIVE
LIPASE SERPL-CCNC: 37 U/L (ref 13–60)
LYMPHOCYTES # BLD AUTO: 3 10E3/UL (ref 0.8–5.3)
LYMPHOCYTES NFR BLD AUTO: 36 %
MAGNESIUM SERPL-MCNC: 2.3 MG/DL (ref 1.7–2.3)
MCH RBC QN AUTO: 29.5 PG (ref 26.5–33)
MCHC RBC AUTO-ENTMCNC: 32.9 G/DL (ref 31.5–36.5)
MCV RBC AUTO: 90 FL (ref 78–100)
MONOCYTES # BLD AUTO: 0.6 10E3/UL (ref 0–1.3)
MONOCYTES NFR BLD AUTO: 7 %
MUCOUS THREADS #/AREA URNS LPF: PRESENT /LPF
NEUTROPHILS # BLD AUTO: 4.6 10E3/UL (ref 1.6–8.3)
NEUTROPHILS NFR BLD AUTO: 55 %
NITRATE UR QL: NEGATIVE
NRBC # BLD AUTO: 0 10E3/UL
NRBC BLD AUTO-RTO: 0 /100
PH UR STRIP: 5.5 [PH] (ref 5–7)
PLATELET # BLD AUTO: 259 10E3/UL (ref 150–450)
POTASSIUM SERPL-SCNC: 3.7 MMOL/L (ref 3.4–5.3)
PROT SERPL-MCNC: 6.9 G/DL (ref 6.4–8.3)
RBC # BLD AUTO: 4.95 10E6/UL (ref 3.8–5.2)
RBC URINE: <1 /HPF
SODIUM SERPL-SCNC: 135 MMOL/L (ref 136–145)
SP GR UR STRIP: 1.01 (ref 1–1.03)
SQUAMOUS EPITHELIAL: <1 /HPF
TSH SERPL DL<=0.005 MIU/L-ACNC: 2.11 UIU/ML (ref 0.3–4.2)
UROBILINOGEN UR STRIP-MCNC: NORMAL MG/DL
WBC # BLD AUTO: 8.4 10E3/UL (ref 4–11)
WBC URINE: 1 /HPF

## 2023-09-12 PROCEDURE — 74177 CT ABD & PELVIS W/CONTRAST: CPT

## 2023-09-12 PROCEDURE — 81001 URINALYSIS AUTO W/SCOPE: CPT | Performed by: EMERGENCY MEDICINE

## 2023-09-12 PROCEDURE — 80048 BASIC METABOLIC PNL TOTAL CA: CPT | Performed by: EMERGENCY MEDICINE

## 2023-09-12 PROCEDURE — 83735 ASSAY OF MAGNESIUM: CPT | Performed by: EMERGENCY MEDICINE

## 2023-09-12 PROCEDURE — 80053 COMPREHEN METABOLIC PANEL: CPT | Performed by: EMERGENCY MEDICINE

## 2023-09-12 PROCEDURE — 99214 OFFICE O/P EST MOD 30 MIN: CPT | Performed by: FAMILY MEDICINE

## 2023-09-12 PROCEDURE — 84443 ASSAY THYROID STIM HORMONE: CPT | Performed by: EMERGENCY MEDICINE

## 2023-09-12 PROCEDURE — 36415 COLL VENOUS BLD VENIPUNCTURE: CPT | Performed by: EMERGENCY MEDICINE

## 2023-09-12 PROCEDURE — 85025 COMPLETE CBC W/AUTO DIFF WBC: CPT | Performed by: EMERGENCY MEDICINE

## 2023-09-12 PROCEDURE — 250N000011 HC RX IP 250 OP 636: Performed by: EMERGENCY MEDICINE

## 2023-09-12 PROCEDURE — 83690 ASSAY OF LIPASE: CPT | Performed by: EMERGENCY MEDICINE

## 2023-09-12 PROCEDURE — 70450 CT HEAD/BRAIN W/O DYE: CPT

## 2023-09-12 PROCEDURE — 99285 EMERGENCY DEPT VISIT HI MDM: CPT | Mod: 25

## 2023-09-12 PROCEDURE — 93005 ELECTROCARDIOGRAM TRACING: CPT

## 2023-09-12 PROCEDURE — 82248 BILIRUBIN DIRECT: CPT | Performed by: EMERGENCY MEDICINE

## 2023-09-12 RX ORDER — IOPAMIDOL 755 MG/ML
500 INJECTION, SOLUTION INTRAVASCULAR ONCE
Status: COMPLETED | OUTPATIENT
Start: 2023-09-12 | End: 2023-09-12

## 2023-09-12 RX ORDER — LORAZEPAM 1 MG/1
1 TABLET ORAL ONCE
Status: COMPLETED | OUTPATIENT
Start: 2023-09-12 | End: 2023-09-13

## 2023-09-12 RX ADMIN — IOPAMIDOL 78 ML: 755 INJECTION, SOLUTION INTRAVENOUS at 23:24

## 2023-09-12 ASSESSMENT — ACTIVITIES OF DAILY LIVING (ADL)
ADLS_ACUITY_SCORE: 35

## 2023-09-12 NOTE — ED TRIAGE NOTES
Patient went to . They sent her here by ambulance. C/O headache worsening since July. Has relief with 12.5mg Toradol from EMS. Increasing dizziness. Feels like room spins

## 2023-09-12 NOTE — ED NOTES
Tele-PIT/Intake Evaluation      Video-Visit Details    Type of service:  Video Visit    Video Start Time (time video started): 6:15 PM  Video End Time (time video stopped): 6:28 PM   Originating Location (pt. Location):  [facility]  Distant Location (provider location):    Mode of Communication:  Video Conference via [email or web address]  Patient verbally consented to [employer] televisit.    History:  Patient presents emergency department with worsening headaches since about June.  They have been coming and going, at times she feels like she gets vision changes when the headaches are more intense.  He is experiencing headache symptoms today some dizziness associated with seen in urgent care referred here.  She is actually feeling a bit improved because she was given some Toradol in route.  Additionally she was diagnosed with COVID at the beginning of September and started Paxlovid and actually thought her headaches improved while she was on that medication and have since worsened again.    Exam:  Gen: Resting comfortably via remote camera  Eyes: Clear conjunctiva  Ears: External ears normal without swelling or drainage  Resp: speaking in full sentences without any resp distress  Skin: no visible rash on exposed skin  Neuro: Alert, no gross motor or sensory deficits,   Psych: pleasant, affect appropriate    Patient Vitals for the past 24 hrs:   BP Temp Temp src Pulse Resp SpO2   09/12/23 1808 (!) 146/82 97.4  F (36.3  C) Temporal 83 20 98 %       Appropriate interventions for symptom management were initiated if applicable.  Appropriate diagnostic tests were initiated if indicated.    Important information for subsequent clinician:      I briefly evaluated the patient and developed an initial plan of care. I discussed this plan and explained that this brief interaction does not constitute a full evaluation. Patient/family understands that they should wait to be fully evaluated and discuss any test  results with another clinician prior to leaving the hospital.       Solomon Cristina MD  09/12/23 [id number]

## 2023-09-12 NOTE — PROGRESS NOTES
"  Assessment & Plan     Dizziness  Nonintractable headache, unspecified chronicity pattern, unspecified headache type  Infection due to COVID    Referred to ED for further eval-- no one with patient-- report called to [facility] ED.    Viki Padilla MD  [facility] [place]le is a 73 year old, presenting for the following health issues:  Urgent Care (Patient states she has an ongoing headache and dizzy ness x July . Patient states she had heart palpitations last night, no appetite, weight loss, depth prosection, vision changes Covid positive 9/2/2023, treated with Paxlovid )      HPI     8/1 cataract surgery  8/16 second cataract surgery  9/1 last post-op check and was told to give the dizziness time    Mid-July HAs started-- has given up regular activities like pickleball due to pain.  Tried chiropracty and massage.  Taking Tylenol.    HX of limited depth perception before and after cataract surgeries.    Tested + for COVID 9/2.  9/5 started Paxlovid.  HA actuallly improved with PAxlovid.    Right now HA is 6-7/10.  All patient is doing is resting due to pain and dizziness.  Cannot even sit up.    Lives alone.  Drove to .    Drove to [place] with reading glasses on this weekend.  Now worsening since Sunday.    Hx of past concussions.    Review of Systems   Constitutional, HEENT, cardiovascular, pulmonary, GI, , musculoskeletal, neuro, skin, endocrine and psych systems are negative, except as otherwise noted.      Objective    /79   Pulse 86   Temp 98.6  F (37  C) (Tympanic)   Ht 1.575 m (5' 2\")   Wt 69.9 kg (154 lb)   SpO2 97%   BMI 28.17 kg/m    Body mass index is 28.17 kg/m .  Physical Exam                       "

## 2023-09-13 ENCOUNTER — APPOINTMENT (OUTPATIENT)
Dept: MRI IMAGING | Facility: CLINIC | Age: 73
End: 2023-09-13
Attending: EMERGENCY MEDICINE
Payer: COMMERCIAL

## 2023-09-13 ENCOUNTER — TELEPHONE (OUTPATIENT)
Dept: PEDIATRICS | Facility: CLINIC | Age: 73
End: 2023-09-13
Payer: COMMERCIAL

## 2023-09-13 VITALS
TEMPERATURE: 97.4 F | OXYGEN SATURATION: 96 % | SYSTOLIC BLOOD PRESSURE: 118 MMHG | DIASTOLIC BLOOD PRESSURE: 63 MMHG | RESPIRATION RATE: 20 BRPM | HEART RATE: 70 BPM

## 2023-09-13 LAB
ATRIAL RATE - MUSE: 81 BPM
DIASTOLIC BLOOD PRESSURE - MUSE: NORMAL MMHG
INTERPRETATION ECG - MUSE: NORMAL
P AXIS - MUSE: 81 DEGREES
PR INTERVAL - MUSE: 126 MS
QRS DURATION - MUSE: 72 MS
QT - MUSE: 384 MS
QTC - MUSE: 446 MS
R AXIS - MUSE: 27 DEGREES
SYSTOLIC BLOOD PRESSURE - MUSE: NORMAL MMHG
T AXIS - MUSE: 36 DEGREES
VENTRICULAR RATE- MUSE: 81 BPM

## 2023-09-13 PROCEDURE — 255N000002 HC RX 255 OP 636: Performed by: EMERGENCY MEDICINE

## 2023-09-13 PROCEDURE — 250N000013 HC RX MED GY IP 250 OP 250 PS 637: Performed by: EMERGENCY MEDICINE

## 2023-09-13 PROCEDURE — A9585 GADOBUTROL INJECTION: HCPCS | Performed by: EMERGENCY MEDICINE

## 2023-09-13 PROCEDURE — 70544 MR ANGIOGRAPHY HEAD W/O DYE: CPT | Mod: XU

## 2023-09-13 PROCEDURE — 70553 MRI BRAIN STEM W/O & W/DYE: CPT

## 2023-09-13 PROCEDURE — 70549 MR ANGIOGRAPH NECK W/O&W/DYE: CPT

## 2023-09-13 RX ORDER — GADOBUTROL 604.72 MG/ML
10 INJECTION INTRAVENOUS ONCE
Status: COMPLETED | OUTPATIENT
Start: 2023-09-13 | End: 2023-09-13

## 2023-09-13 RX ADMIN — LORAZEPAM 1 MG: 1 TABLET ORAL at 00:16

## 2023-09-13 RX ADMIN — GADOBUTROL 10 ML: 604.72 INJECTION INTRAVENOUS at 00:26

## 2023-09-13 ASSESSMENT — ACTIVITIES OF DAILY LIVING (ADL): ADLS_ACUITY_SCORE: 35

## 2023-09-13 NOTE — ED PROVIDER NOTES
History     Chief Complaint:  Headache     The history is provided by the patient.      Lolis Mcdonald is a 73 year old female who presents to the ED via EMS with her daughter for evaluation of a headache. The patient reports that since July, she has had a headache that has gotten worse. When the headaches first started, she felt dizzy when getting out of bed and states it felt the worst in the morning. She states that she slept for 12 hours last night because she feels dizzy whenever she opens her eyes. Patient has the headaches and dizziness together. Last night, patient reports having an episode of her heart racing, and an episode of right leg pain this morning, which resolved after massaging it. She endorses diarrhea, nausea, and neck pain, which is from titanium in her neck and having chronic neck pain. Of note, patient is very active and healthy and has had a cataract surgery and COVID since her headaches started, so she hasn't been being active as much. Also mentions that she has had a decrease in appetite and has lost 10-15 lbs. Patient does have family history of MS, cancer, and diabetes. Denies eye pain, vomiting, fever, abdominal pain, rashes, urinary symptoms, vaginal bleeding, vaginal discharge, or arm or leg weakness.    Independent Historian:   None - Patient Only    Medications:    Zyrtec   Glucosamine   Lexapro     Past Medical History:    Arthritis  Chronic pain  Postoperative nausea and vomiting   Obstructive sleep apnea  Squamous cell skin cancer    Past Surgical History:    Colonoscopy   Enhance laser refractive bilateral existing PATIENT in parameters   Hysterectomy   Combined septoplasty, turbinoplasty   Titanium neck  Titanium right hip   Tonsillectomy and adenoidectomy   Cervical fusion   Varicose vein removal right  Repair rectovaginal fistula     Physical Exam   Patient Vitals for the past 24 hrs:   BP Temp Temp src Pulse Resp SpO2   09/12/23 2230 (!) 145/72 -- -- 85 -- 99 %   09/12/23  2215 (!) 142/81 -- -- 72 -- 99 %   230 139/67 -- -- 71 -- 99 %   23 133/72 -- -- 65 -- 99 %   23 2142 (!) 159/73 -- -- 68 -- 99 %   23 1808 (!) 146/82 97.4  F (36.3  C) Temporal 83 20 98 %      Physical Exam  General: Patient is awake, alert and interactive   Head: No signs of trauma.   Eyes: The pupils are equal, round, and reactive to light. Conjunctivae and sclerae are normal. No nystagmus. Full ROM of both eyes and appears symmetric without obvious palsy.   ENT: TMs are clear bilaterally without effusion.   Neck: Normal range of motion.   CV: Regular rate. S1/S2. No murmurs.   Resp: Lungs are clear without wheezes or rales. No respiratory distress.   GI: Abdomen is soft, no rigidity, guarding, or rebound. No distension. No tenderness to palpation in any quadrant.  MS: Normal tone. Joints grossly normal without effusions. No asymmetric leg swelling, calf or thigh tenderness.    Skin: No rash or lesions noted. Normal capillary refill noted  Neuro:   Speech is normal and fluent.   Face is symmetric without droop. CN's II-XII intact. Negative pronator drift. Finger to nose intact. Heel to shin intact.   Right Arm: Good  strength. 5/5 elbow flexion. 5/5 elbow extension. Sensation intact to light touch.   Left Arm: Good  strength. 5/5 elbow flexion. 5/5 elbow extension. Sensation intact to light touch.   Right Le/5 straight leg raise, 5/5 knee flexion, 5/5 knee extension, 5/5 dorsiflexion, 5/5 plantar flexion. Sensation intact to light touch.   Left Le/5 straight leg raise, 5/5 knee flexion, 5/5 knee extension, 5/5 dorsiflexion, 5/5 plantar flexion. Sensation intact to light touch.    Psych:  Normal affect.  Appropriate interactions.    Emergency Department Course   ECG  ECG results from 23   EKG 12-lead, tracing only     Value    Systolic Blood Pressure     Diastolic Blood Pressure     Ventricular Rate 81    Atrial Rate 81    KS Interval 126    QRS Duration 72         QTc 446    P Axis 81    R AXIS 27    T Axis 36    Interpretation ECG      Sinus rhythm  Right atrial enlargement  Borderline ECG  When compared with ECG of 08-JAN-2019 05:07,  No significant change was found        Imaging:  MR Brain w/o & w Contrast   Final Result   IMPRESSION:   HEAD MRI:   No acute findings. Mild age-related changes.      HEAD MRA:   No stenosis/occlusion, aneurysm, or high flow vascular malformation.      NECK MRA:   No measurable stenosis or dissection.         MRA Angiogram Head w/o Contrast   Final Result   IMPRESSION:   HEAD MRI:   No acute findings. Mild age-related changes.      HEAD MRA:   No stenosis/occlusion, aneurysm, or high flow vascular malformation.      NECK MRA:   No measurable stenosis or dissection.         MRA Angiogram Neck w/o & w Contrast   Final Result   IMPRESSION:   HEAD MRI:   No acute findings. Mild age-related changes.      HEAD MRA:   No stenosis/occlusion, aneurysm, or high flow vascular malformation.      NECK MRA:   No measurable stenosis or dissection.         CT Chest/Abdomen/Pelvis w Contrast   Final Result   IMPRESSION:   1.  No evidence for malignancy in the chest, abdomen, or pelvis.               Head CT w/o contrast   Final Result   IMPRESSION:   1.  No acute transcortical infarct, intracranial hemorrhage, or mass effect.               Report per radiology    Laboratory:  Labs Ordered and Resulted from Time of ED Arrival to Time of ED Departure   BASIC METABOLIC PANEL - Abnormal       Result Value    Sodium 135 (*)     Potassium 3.7      Chloride 100      Carbon Dioxide (CO2) 27      Anion Gap 8      Urea Nitrogen 22.8      Creatinine 0.86      Calcium 9.2      Glucose 95      GFR Estimate 71     ROUTINE UA WITH MICROSCOPIC REFLEX TO CULTURE - Abnormal    Color Urine Light Yellow      Appearance Urine Clear      Glucose Urine Negative      Bilirubin Urine Negative      Ketones Urine Negative      Specific Gravity Urine 1.015      Blood Urine  Negative      pH Urine 5.5      Protein Albumin Urine Negative      Urobilinogen Urine Normal      Nitrite Urine Negative      Leukocyte Esterase Urine Negative      Mucus Urine Present (*)     RBC Urine <1      WBC Urine 1      Squamous Epithelials Urine <1     HEPATIC FUNCTION PANEL - Normal    Protein Total 6.9      Albumin 4.1      Bilirubin Total 0.2      Alkaline Phosphatase 73      AST 22      ALT 21      Bilirubin Direct <0.20     LIPASE - Normal    Lipase 37     TSH WITH FREE T4 REFLEX - Normal    TSH 2.11     MAGNESIUM - Normal    Magnesium 2.3     CBC WITH PLATELETS AND DIFFERENTIAL    WBC Count 8.4      RBC Count 4.95      Hemoglobin 14.6      Hematocrit 44.4      MCV 90      MCH 29.5      MCHC 32.9      RDW 13.2      Platelet Count 259      % Neutrophils 55      % Lymphocytes 36      % Monocytes 7      % Eosinophils 1      % Basophils 0      % Immature Granulocytes 1      NRBCs per 100 WBC 0      Absolute Neutrophils 4.6      Absolute Lymphocytes 3.0      Absolute Monocytes 0.6      Absolute Eosinophils 0.1      Absolute Basophils 0.0      Absolute Immature Granulocytes 0.1      Absolute NRBCs 0.0        Emergency Department Course & Assessments:     Interventions:  Medications   LORazepam (ATIVAN) tablet 1 mg (1 mg Oral $Given 9/13/23 0016)   iopamidol (ISOVUE-370) solution 500 mL (78 mLs Intravenous $Given 9/12/23 2324)   sodium chloride (PF) 0.9% PF flush 100 mL (59 mLs Intravenous $Given 9/12/23 2320)   gadobutrol (GADAVIST) injection 10 mL (10 mLs Intravenous $Given 9/13/23 0026)   sodium chloride (PF) 0.9% PF flush 60 mL (100 mLs Intravenous $Given 9/13/23 0026)      Independent Interpretation (X-rays, CTs, rhythm strip):  I independently reviewed the head CT and see no sign of significant mass or bleeding.     Assessments/Consultations/Discussion of Management or Tests:  ED Course as of 09/13/23 0142 Tue Sep 12, 2023   2231 I obtained history and examined the patient as noted above.      Social  Determinants of Health affecting care:   None    Disposition:  The patient was discharged to home.     Impression & Plan    Medical Decision Making:  With past medical history of obstructive sleep apnea who presents to the emergency department with intermittent headache that is worse in the morning, accompanying dizziness, fatigue and weight loss that is been worsening since July.  Patient reports her headache is intermittent in nature but usually present in the morning.  Its not worse with exertion.  When she has the headache she also has accompanying dizziness.  She denies any fever, chills, nausea or vomiting.  Denies any weakness or numbness in arm or leg.  She also notes that she has been increasingly fatigued and not able to perform her normal activities including pickleball.  She also notes a 10 to 15 pound weight loss over the past couple months.  Upon initial evaluation here she is hemodynamically stable with normal vital signs.  She is afebrile and oxygen well on room air.  On physical exam she is well-appearing.  Physical exam shows no acute signs of infection.  Neurologic examination is normal without deficit.  However given her complaints and age there was concern for possible central neurological pathology MRI of the brain and MRA was obtained.  This was negative for any clear etiology of the patient's symptoms including. No evidence of infarction, masses or hemorrhage.    Diagnosis:    ICD-10-CM    1. Acute nonintractable headache, unspecified headache type  R51.9       2. Dizziness  R42       3. Weight loss  R63.4       4. Fatigue, unspecified type  R53.83          Discharge Medications:  New Prescriptions    No medications on file      Scribe Disclosure:  BRANDIE HANLEY, am serving as a scribe at 10:34 PM on 9/12/2023 to document services personally performed by Tod Rhodes MD based on my observations and the provider's statements to me.     9/12/2023   Tod Rhodes  MD Meagan Mathur Christopher Joseph, MD  09/13/23 0673

## 2023-09-13 NOTE — TELEPHONE ENCOUNTER
Reason for Call:  Appointment Request    Patient requesting this type of appt:  Hospital/ED Follow-Up     Requested provider: Edyta Degroot    Reason patient unable to be scheduled: Not within requested timeframe    When does patient want to be seen/preferred time: 1-2 days    Comments: Hosp. Follow-up 9/12/2023-9/13/2023 MRI, CT, LAB follow-up. Neuro appt scheduled for October already. MRI and CT didn't show any signs of chest and brain issues. Still isn't feeling great. Please contact patient to schedule    Could we send this information to you in Ghost[employer] or would you prefer to receive a phone call?:   Patient would prefer a phone call   Okay to leave a detailed message?: Yes at Cell number on file:    Telephone Information:   Mobile [phone]       Call taken on 9/13/2023 at 10:48 AM by AMOL LOU

## 2023-09-13 NOTE — TELEPHONE ENCOUNTER
Outgoing call spoke to patient.    Scheduled in (SD) slot no DERRICK left. Scheduled for tomorrow Thursday September 14th arrive at 9:40am for check-in.    Thank you  Nakul ELAINE

## 2023-09-14 ENCOUNTER — OFFICE VISIT (OUTPATIENT)
Dept: PEDIATRICS | Facility: CLINIC | Age: 73
End: 2023-09-14
Payer: COMMERCIAL

## 2023-09-14 VITALS
HEART RATE: 84 BPM | RESPIRATION RATE: 16 BRPM | HEIGHT: 62 IN | WEIGHT: 154.8 LBS | OXYGEN SATURATION: 98 % | SYSTOLIC BLOOD PRESSURE: 102 MMHG | BODY MASS INDEX: 28.49 KG/M2 | TEMPERATURE: 98.8 F | DIASTOLIC BLOOD PRESSURE: 64 MMHG

## 2023-09-14 DIAGNOSIS — R42 VERTIGO: Primary | ICD-10-CM

## 2023-09-14 LAB
ANION GAP SERPL CALCULATED.3IONS-SCNC: 11 MMOL/L (ref 7–15)
BUN SERPL-MCNC: 17.5 MG/DL (ref 8–23)
CALCIUM SERPL-MCNC: 8.9 MG/DL (ref 8.8–10.2)
CHLORIDE SERPL-SCNC: 104 MMOL/L (ref 98–107)
CREAT SERPL-MCNC: 0.76 MG/DL (ref 0.51–0.95)
DEPRECATED HCO3 PLAS-SCNC: 23 MMOL/L (ref 22–29)
EGFRCR SERPLBLD CKD-EPI 2021: 83 ML/MIN/1.73M2
GLUCOSE SERPL-MCNC: 89 MG/DL (ref 70–99)
POTASSIUM SERPL-SCNC: 4.1 MMOL/L (ref 3.4–5.3)
SODIUM SERPL-SCNC: 138 MMOL/L (ref 136–145)

## 2023-09-14 PROCEDURE — G0008 ADMIN INFLUENZA VIRUS VAC: HCPCS | Performed by: PHYSICIAN ASSISTANT

## 2023-09-14 PROCEDURE — 99214 OFFICE O/P EST MOD 30 MIN: CPT | Mod: 25 | Performed by: PHYSICIAN ASSISTANT

## 2023-09-14 PROCEDURE — 36415 COLL VENOUS BLD VENIPUNCTURE: CPT | Performed by: PHYSICIAN ASSISTANT

## 2023-09-14 PROCEDURE — 80048 BASIC METABOLIC PNL TOTAL CA: CPT | Performed by: PHYSICIAN ASSISTANT

## 2023-09-14 PROCEDURE — 90662 IIV NO PRSV INCREASED AG IM: CPT | Performed by: PHYSICIAN ASSISTANT

## 2023-09-14 RX ORDER — ONDANSETRON 8 MG/1
8 TABLET, ORALLY DISINTEGRATING ORAL EVERY 8 HOURS PRN
Qty: 30 TABLET | Refills: 0 | Status: SHIPPED | OUTPATIENT
Start: 2023-09-14 | End: 2023-09-28

## 2023-09-14 RX ORDER — MULTIVIT WITH MINERALS/LUTEIN
1 TABLET ORAL DAILY
COMMUNITY

## 2023-09-14 RX ORDER — MECLIZINE HYDROCHLORIDE 25 MG/1
25 TABLET ORAL 3 TIMES DAILY PRN
Qty: 30 TABLET | Refills: 1 | Status: SHIPPED | OUTPATIENT
Start: 2023-09-14 | End: 2023-09-28

## 2023-09-14 NOTE — PROGRESS NOTES
"  Assessment & Plan     Vertigo    Pt follow up from emergency department visit for headache and dizziness. Reviewed emergency department notes, labs and imaging study, extensive work up normal, with exception of age related changes brain mri and mildly low sodium ( one pt off).  Normal neuro exam today in office. She does have some vertigo type sxs with turning head quick to right. Will send to PT for vestibular therapy. Keep neurology apt. Started meclizine, zofran if needed. Discussed side effect profile.   Stay hydrated. Small, slow movements.   There is some concern about the eye- she reports if she covers left eye- symptoms seem to improve some, she just had bilateral cataract surgery in August- will have her continue to follow up with them.  If numbness, tingling or weakness, worsening headache, vomiting to emergency department.   The patient indicates understanding of these issues and agrees with the plan.  Keep upcoming apt with me.     - ondansetron (ZOFRAN ODT) 8 MG ODT tab  Dispense: 30 tablet; Refill: 0  - meclizine (ANTIVERT) 25 MG tablet  Dispense: 30 tablet; Refill: 1  - Physical Therapy Referral  - Basic metabolic panel  (Ca, Cl, CO2, Creat, Gluc, K, Na, BUN)  - Basic metabolic panel  (Ca, Cl, CO2, Creat, Gluc, K, Na, BUN)             MED REC REQUIRED  Post Medication Reconciliation Status: patient was not discharged from an inpatient facility or TCU  BMI:   Estimated body mass index is 28.31 kg/m  as calculated from the following:    Height as of this encounter: 1.575 m (5' 2\").    Weight as of this encounter: 70.2 kg (154 lb 12.8 oz).           Edyta Degroot PA-C  [facility] STALIN Danielle is a 73 year old, presenting for the following health issues:  ER F/U      9/14/2023     9:28 AM   Additional Questions   Roomed by Eun   Accompanied by Daughter         9/14/2023     9:28 AM   Patient Reported Additional Medications   Patient reports taking the following new " "medications no       HPI     ED/UC Followup:    Facility:  [facility]  Date of visit: 9/12/23  Reason for visit: Headache,dizziness  Current Status: continues with headache and dizziness      Here for follow up emergency department visit for headache and dizziness. She reports she had been having sxs for 6 weeks. They were becoming more frequent, starting to last all day. She was noting nausea and weight loss. No numbness, tingling or weakness. In the process she had bilateral cataract surgery and covid illness. She feels that if she covers her left eye, some of her symptoms feels better. She went back to eye provider, who had suggested evaluation by neurology first.   In the emergency department her work up was essentially neg.  Labs normal with exception of mildly low sodium ( one point).  She had jose de jesus CT of chest abdomen and pelvis. Normal carotid artery study. CT of Head and brain- mild age related changes. . EKG normal.  She was not given any medications on discharge, told to follow up with neuro and pcp.   Continues to have the same symptoms.     Wt Readings from Last 5 Encounters:   09/14/23 70.2 kg (154 lb 12.8 oz)   09/12/23 69.9 kg (154 lb)   07/20/23 73.3 kg (161 lb 8 oz)   07/13/22 73 kg (161 lb)   02/02/21 68.5 kg (151 lb)      Review of Systems         Objective    /64 (BP Location: Right arm, Patient Position: Sitting, Cuff Size: Adult Regular)   Pulse 84   Temp 98.8  F (37.1  C) (Tympanic)   Resp 16   Ht 1.575 m (5' 2\")   Wt 70.2 kg (154 lb 12.8 oz)   SpO2 98%   BMI 28.31 kg/m    Body mass index is 28.31 kg/m .  Physical Exam   GENERAL: healthy, alert and no distress  EYES: Eyes grossly normal to inspection, PERRL and conjunctivae and sclerae normal. No nystagmus or lid lag noted.   HENT: ear canals and TM's normal, nose and mouth without ulcers or lesions  NECK: no adenopathy, no asymmetry, masses, or scars and thyroid normal to palpation  RESP: lungs clear to auscultation - no rales, " rhonchi or wheezes  CV: regular rate and rhythm, normal S1 S2, no S3 or S4, no murmur, click or rub, no peripheral edema and peripheral pulses strong  MS: no gross musculoskeletal defects noted, no edema  SKIN: no suspicious lesions or rashes  NEURO: Normal strength and tone, mentation intact and speech normal  PSYCH: mentation appears normal, affect normal/bright  LYMPH: no cervical, supraclavicular, axillary, or inguinal adenopathy

## 2023-09-19 ENCOUNTER — THERAPY VISIT (OUTPATIENT)
Dept: PHYSICAL THERAPY | Facility: CLINIC | Age: 73
End: 2023-09-19
Payer: COMMERCIAL

## 2023-09-19 DIAGNOSIS — H81.10 BENIGN PAROXYSMAL POSITIONAL VERTIGO, UNSPECIFIED LATERALITY: Primary | ICD-10-CM

## 2023-09-19 DIAGNOSIS — R42 VERTIGO: ICD-10-CM

## 2023-09-19 PROCEDURE — 97162 PT EVAL MOD COMPLEX 30 MIN: CPT | Mod: GP | Performed by: PHYSICAL THERAPIST

## 2023-09-19 PROCEDURE — 95992 CANALITH REPOSITIONING PROC: CPT | Mod: GP | Performed by: PHYSICAL THERAPIST

## 2023-09-19 PROCEDURE — 97112 NEUROMUSCULAR REEDUCATION: CPT | Mod: GP | Performed by: PHYSICAL THERAPIST

## 2023-09-19 NOTE — PROGRESS NOTES
"PHYSICAL THERAPY EVALUATION  Type of Visit: Evaluation    See electronic medical record for Abuse and Falls Screening details.    Subjective       Presenting condition or subjective complaint: Headaches/dizziness for over a month.  Presented to urgent care on 9/12/2023 and referred to ED - negative findings in ED.  See below for additional vestibular information.  Date of onset: 07/12/23    Relevant medical history: Arthritis; Dizziness; Severe dizziness; Severe headaches; Vision problems   Past Medical History:   Diagnosis Date    Arthritis     Chronic pain     sciatic pain left leg to toes    Numbness and tingling     left leg    PONV (postoperative nausea and vomiting)     hx nausea postop   Dates & types of surgery: Per patient, \"In ER for tests on Sept 12 for headaches & dizziness\"  Past Surgical History:   Procedure Laterality Date    COLONOSCOPY N/A 03/04/2021    Procedure: COLONOSCOPY;  Surgeon: Gerard Siddiqi MD;  Location:  GI    ENHANCE LASER REFRACTIVE BILATERAL EXISTING PT IN PARAMETERS      HYSTERECTOMY, PAP NO LONGER INDICATED      SEPTOPLASTY, TURBINOPLASTY, COMBINED  01/20/2012    Procedure:COMBINED SEPTOPLASTY, TURBINOPLASTY; SEPTOPLASTY, TURBINATE Surgery ; Surgeon:OPAL FUENTES; Location:[facility] OR    titanium neck      titanium right hip Right     cut bone out of hip to place in neck, so needed plate in righthip    TONSILLECTOMY & ADENOIDECTOMY  age 6    [facility] NONSPECIFIC PROCEDURE  2003    cervical spine surgery     [facility] NONSPECIFIC PROCEDURE      varacose carroll removal right     Prior diagnostic imaging/testing results: MRI; CT scan - see electronic medical record  Prior therapy history for the same diagnosis, illness or injury: treated for BPPV and vestibular hypofunction for 4 visits 1/16/2019-2/12/2019    Prior Level of Function  Transfers: Independent  Ambulation: Independent  ADL: Independent  Active with different activities - pickleball, playing cards (hasn't been able to play " "pickleball)  Just resumed driving a few days ago    Living Environment  Social support: Alone   Type of home: Apartment/condo   Stairs to enter the home: No       Ramp: No   Stairs inside the home: No       Help at home: None; Other  Equipment owned:       Employment: No    Hobbies/Interests: Pickleball, fitness classes, cards, walking    Patient goals for therapy: Drive, play pickleball & other activities    Pain assessment:  Headache: frontal area and posterior neck/base of skull - \"aches, sore, dull pressure\" - went to chiropractor, massage helped but didn't left.         Objective   Cognitive Status Examination  Orientation: Oriented to person, place and time   Level of Consciousness: Alert  Follows Commands and Answers Questions: 100% of the time  Personal Safety and Judgement: Intact  Memory: Intact    OBSERVATION: Pleasant, in no apparent distress  POSTURE: Forward head, rounded posturing  RANGE OF MOTION: WFL for tasks performed  STRENGTH: WFL for tasks performed    BED MOBILITY: Independent    TRANSFERS: Independent    GAIT:   Level of [place]: Independent  Assistive Device(s): None  Gait Deviations:  None  Gait Distance: Around clinic space    BALANCE: Good, no significant deficits observed with functional mobility       VESTIBULAR EVALUATION  ADDITIONAL HISTORY:  Dizziness first symptom that began in mid/end of July with progression of symptoms to include headache.  Patient with recent COVID dx and had cataract surgery in early September.  Reports that she doesn't have a headache unless she is dizzy.  Did look-up treatment for BPPV and has done some of the maneuvers and \"exercises\" - which helped decreased symptoms.  Description of symptoms: Off balance; Attacks of dizziness; Blurry or jumping vision; Light-headedness  Dizzy attacks:   Start: Middle of July   Last attack: Today - \"late morning\"   Frequency of occurrences: Every day   Length of attack: per patient, \"All day\"  Difficulty hearing:  " No  Noise in ears? No    Alleviates symptoms: Motrin or another IB profen  Worsens symptoms: Movement/ opening eyes  Activities that bring on symptoms: Riding in a car; Driving a car; Bending over; Reaching up; Turning while walking; Walking up or down stairs     Pertinent visual history: recent cataract surgery, has mono vision and is wearing contact in one eye    DHI: Total Score: 90    Cervicogenic Screen    Neck ROM WFL for vestibular testing   Vertebral Artery Test Normal     Oculomotor Screen    Ocular ROM Normal   Smooth Pursuit Normal   Saccades To be assessed at future visit   VOR To be assessed at future visit   VOR Cancellation To be assessed at future visit   Head Impulse Test To be assessed at future visit   Convergence Testing To be assessed at future visit        Infrared Goggle Exam Vestibular Suppressant in Last 24 Hours? No  Exam Completed With: Infrared goggles   Spontaneous Nystagmus Negative   Gaze Evoked Nystagmus Negative   Head Shake Horizontal Nystagmus To be assessed at future visit   Positional Testing     Left Right   [place]-Hallpike Negative Upbeating R torsional, mild symptoms, fatigued   [facility] Supine Roll Test Horizontal R, no symptoms, weaker nystagmus Horizontal L, no symptoms   BPPV Canal(s): L Horizontal, R Posterior  BPPV Type: Cupulolithasis, Canalithasis    Assessment & Plan   CLINICAL IMPRESSIONS  Medical Diagnosis: Vertigo    Treatment Diagnosis: BPPV; dizziness with head movement/change of body position   Impression/Assessment: Patient is a 73 year old female with signs and symptoms consistent with mulit-canal BPPV impacting her ability to participate in daily activities with additional assessment at future visit(s) (prior findings in 2019 + left head thrust and impaired DVA).  The following significant findings have been identified: Decreased activity tolerance, Impaired posture, vision problems and Dizziness. These impairments interfere with their ability to perform self care  tasks, recreational activities, household chores, driving , household mobility, and community mobility as compared to previous level of function.     Clinical Decision Making (Complexity):  Clinical Presentation: Evolving/Changing  Clinical Presentation Rationale: based on medical and personal factors listed in PT evaluation  Clinical Decision Making (Complexity): Moderate complexity    PLAN OF CARE  Treatment Interventions:  Interventions: Neuromuscular Re-education, Therapeutic Activity, Therapeutic Exercise, Self-Care/Home Management, Canalith Repositioning, Standardized Testing    Long Term Goals     PT Goal 1  Goal Identifier: HEP  Goal Description: Pt will demonstrate IND with BPPV repositioning techniques to promote reduction of vestibular symptoms and improve safety with functional mobility, if symptoms return.  Target Date: 11/17/23  PT Goal 2  Goal Identifier: DHI  Goal Description: Pt will report 18pt decrease on DHI (MCID), for subjective reduction of dizziness symptoms with completion of daily activities.  Goal Progress:  (9/14/2023: 90/100)  Target Date: 11/17/23  PT Goal 3  Goal Identifier: Positional Testing  Goal Description: Pt will complete BPPV positional testing without observed nystagmus and/or increase of dizziness.  Target Date: 10/18/23  PT Goal 4  Goal Identifier: DVA  Goal Description: The patient will score within 2 lines of SVA with performance of DVA to demonstrate that her vestibular system is functioning optimally and is able to support gaze stability within a functional range.  Rationale: to maximize safety and independence within the community  Target Date: 11/17/23      Frequency of Treatment: 1x/week  Duration of Treatment: 60 days    Education Assessment:   Learner/Method: Patient;Listening;Demonstration;Pictures/Video;No Barriers to Learning    Risks and benefits of evaluation/treatment have been explained.   Patient/Family/caregiver agrees with Plan of Care.     Evaluation  Time:     PT Neal, Moderate Complexity Minutes (56712): 20     Signing Clinician: Rachelle Martini, PT, DPT      [facility]                                                                                   OUTPATIENT PHYSICAL THERAPY      PLAN OF TREATMENT FOR OUTPATIENT REHABILITATION   Patient's Last Name, First Name, ASIFPaulaDAYANPaula  McdonaldLolis  ANETTE YOB: 1950   Provider's Name   [facility]   Medical Record No.  [id number]     Onset Date: 07/12/23  Start of Care Date: 09/19/23     Medical Diagnosis:  Vertigo      PT Treatment Diagnosis:  BPPV; dizziness with head movement/change of body position Plan of Treatment  Frequency/Duration: 1x/week/ 60 days    Certification date from 09/19/23 to 11/17/23         See note for plan of treatment details and functional goals     Rachelle Martini PT                         I CERTIFY THE NEED FOR THESE SERVICES FURNISHED UNDER        THIS PLAN OF TREATMENT AND WHILE UNDER MY CARE     (Physician attestation of this document indicates review and certification of the therapy plan).              Referring Provider:  Edyta Degroot PA-C       Initial Assessment  See Epic Evaluation- Start of Care Date: 09/19/23

## 2023-09-21 SDOH — HEALTH STABILITY: PHYSICAL HEALTH: ON AVERAGE, HOW MANY MINUTES DO YOU ENGAGE IN EXERCISE AT THIS LEVEL?: 60 MIN

## 2023-09-21 SDOH — HEALTH STABILITY: PHYSICAL HEALTH: ON AVERAGE, HOW MANY DAYS PER WEEK DO YOU ENGAGE IN MODERATE TO STRENUOUS EXERCISE (LIKE A BRISK WALK)?: 5 DAYS

## 2023-09-21 ASSESSMENT — ENCOUNTER SYMPTOMS
PALPITATIONS: 0
HEADACHES: 0
DIARRHEA: 0
EYE PAIN: 0
DIZZINESS: 1
SORE THROAT: 0
ARTHRALGIAS: 1
SHORTNESS OF BREATH: 0
HEMATURIA: 0
COUGH: 0
ABDOMINAL PAIN: 0
NERVOUS/ANXIOUS: 0
CONSTIPATION: 0
HEARTBURN: 0
MYALGIAS: 0
HEMATOCHEZIA: 0
CHILLS: 0
DYSURIA: 0
BREAST MASS: 0
JOINT SWELLING: 0
WEAKNESS: 0
PARESTHESIAS: 0
NAUSEA: 0
FEVER: 0
FREQUENCY: 0

## 2023-09-21 ASSESSMENT — LIFESTYLE VARIABLES
HOW OFTEN DO YOU HAVE A DRINK CONTAINING ALCOHOL: MONTHLY OR LESS
HOW OFTEN DO YOU HAVE SIX OR MORE DRINKS ON ONE OCCASION: NEVER
SKIP TO QUESTIONS 9-10: 1
HOW MANY STANDARD DRINKS CONTAINING ALCOHOL DO YOU HAVE ON A TYPICAL DAY: 1 OR 2
AUDIT-C TOTAL SCORE: 1

## 2023-09-21 ASSESSMENT — ACTIVITIES OF DAILY LIVING (ADL): CURRENT_FUNCTION: NO ASSISTANCE NEEDED

## 2023-09-21 ASSESSMENT — SOCIAL DETERMINANTS OF HEALTH (SDOH)
IN A TYPICAL WEEK, HOW MANY TIMES DO YOU TALK ON THE PHONE WITH FAMILY, FRIENDS, OR NEIGHBORS?: MORE THAN THREE TIMES A WEEK
HOW OFTEN DO YOU ATTENT MEETINGS OF THE CLUB OR ORGANIZATION YOU BELONG TO?: MORE THAN 4 TIMES PER YEAR
HOW OFTEN DO YOU GET TOGETHER WITH FRIENDS OR RELATIVES?: MORE THAN THREE TIMES A WEEK
HOW OFTEN DO YOU ATTEND CHURCH OR RELIGIOUS SERVICES?: NEVER
DO YOU BELONG TO ANY CLUBS OR ORGANIZATIONS SUCH AS CHURCH GROUPS UNIONS, FRATERNAL OR ATHLETIC GROUPS, OR SCHOOL GROUPS?: YES

## 2023-09-21 NOTE — COMMUNITY RESOURCES LIST (ENGLISH)
09/21/2023   [facility] [employer]  N/A  For questions about this resource list or additional care needs, please contact your primary care clinic or care manager.  Phone: [phone]   Email: N/A   Address: [address] [place] [address]   Hours: N/A        Hotlines and Helplines       Hotline - Housing crisis  1  [facility] (Main Office) - Emergency Services Distance: 3.79 miles      Phone/Virtual   [address] [place] [address]  Language: English  Hours: Mon - Sun Open 24 Hours   Phone: (289) 443-1607 Email: info@[facility].org Website: http://[facility].org     2  [facility] Distance: 10.41 miles      Phone/Virtual   [address] [place] [address]  Language: English  Hours: Mon - Sun Open 24 Hours   Phone: (301) 229-1892 Email: communications@[facility].org Website: https://[facility].org/oursaviourshousing/          Housing       Coordinated Entry access point  3  [facility] ([facility] - Housing Services Distance: 10.34 miles      In-Person   [address] [place] [address]  Language: English  Hours: Mon - Fri 9:00 AM - 5:00 PM  Fees: Free   Phone: (785) 991-9801 Email: housing@[facility].org Website: http://www.[facility].org/housing     4  [facility] Distance: 10.85 miles      In-Person, Phone/Virtual   [address] [place] [address]  Language: English, Turkish  Hours: Mon - Fri 8:30 AM - 4:30 PM  Fees: Free   Phone: (365) 438-9716 Email: info@[facility].org Website: https://www.[facility]care.org/locations/[facility]-clinic/     Drop-in center or day shelter  5  [facility] Distance: 10.76 miles      In-Person   [address] [place] [address]  Language: English  Hours: Mon - Fri 12:00 PM - 3:00 PM  Fees: Free   Phone: (435) 359-7923 Email: esthelaSamfindcomjamari@[employer].com Website: http://[employer].org/     6  [facility] [place] [facility] [place] -  [address] - [facility] [place] [facility] Distance: 10.83 miles      In-Person   [address] [place] [address]  Language: English  Hours: Mon - Sun 9:00 AM - 5:30 PM  Fees: Free, Self Pay   Phone: (383) 926-7392 Email: info@[employer] Website: https://www.[employer]/locations/[facility]/     Housing search assistance  7  [facility] - Rental Homes for Future Homebuyers Program Distance: 4.27 miles      Phone/Virtual   [address] [place] [address]  Language: English  Hours: Mon - Fri 8:00 AM - 4:30 PM  Fees: Free   Phone: (168) 126-2373 Email: hra@[facility] Website: https://www.[facility]/hra/[place]-housing-and-jbuqugcgoiive-htqubpzxm-vtn     8  [facility] - Online Housing Search Assistance Distance: 7.68 miles      Phone/Virtual   [address] [place] [address]  Language: English  Hours: Mon - Sun Open 24 Hours  Fees: Free   Phone: (119) 330-8573 Email: findhouaubree@[facility].[employer] Website: https://[facility].[employer]/     Shelter for families  9  [facility] Family Shelter Distance: 4.17 miles      In-Person   [address] [place] [address]  Language: English  Hours: Mon - Sun Open 24 Hours  Fees: Free, Sliding Fee   Phone: (190) 154-1281 Ext.1 Email: info@[facility].[employer] Website: http://www.[facility].org     Shelter for individuals  10  [facility] ([facility]) [facility], [facility] Distance: 7.19 miles      In-Person   [address] [place] [address]  Language: English  Hours: Mon - Fri 8:00 AM - 4:30 PM  Fees: Free   Phone: (318) 966-8746 Email: info@[facility].org Website: http://www.[facility].org     11  [facility] Distance: 10.41 miles      In-Person   [address] [facility] [place] [address]  Language: English  Hours: Mon - Sun Open 24 Hours  Fees: Free   Phone: (845) 649-1028  Email: communications@[facility].org Website: https://[facility]-[place].org/oursaviourshousing/          Important Numbers & Websites       Emergency Services   911  [facility] Services   311  Poison Control   (450) 746-9997  Suicide Prevention Lifeline   (685) 222-1620 (TALK)  Child Abuse Hotline   (591) 427-9604 (4-A-Child)  Sexual Assault Hotline   (895) 118-1209 (HOPE)  National Runaway Safeline   (288) 194-9858 (RUNAWAY)  All-Options Talkline   (842) 280-3792  Substance Abuse Referral   (732) 966-6399 (HELP)

## 2023-09-23 ENCOUNTER — HEALTH MAINTENANCE LETTER (OUTPATIENT)
Age: 73
End: 2023-09-23

## 2023-09-27 ENCOUNTER — THERAPY VISIT (OUTPATIENT)
Dept: PHYSICAL THERAPY | Facility: CLINIC | Age: 73
End: 2023-09-27
Payer: COMMERCIAL

## 2023-09-27 DIAGNOSIS — R42 VERTIGO: Primary | ICD-10-CM

## 2023-09-27 DIAGNOSIS — H81.10 BENIGN PAROXYSMAL POSITIONAL VERTIGO, UNSPECIFIED LATERALITY: ICD-10-CM

## 2023-09-27 PROCEDURE — 97112 NEUROMUSCULAR REEDUCATION: CPT | Mod: GP

## 2023-09-27 NOTE — PROGRESS NOTES
09/27/23 1100   Signing Clinician's Name / Credentials   Signing clinician's name / credentials Mary Berg, PT, DPT, NCS   Functional Gait Assessment (MALIKA Carranza, LANDON Macias, et al. (2004))   1. GAIT LEVEL SURFACE 3   2. CHANGE IN GAIT SPEED 3   3. GAIT WITH HORIZONTAL HEAD TURNS 2   4. GAIT WITH VERTICAL HEAD TURNS 2   5. GAIT AND PIVOT TURN 2   6. STEP OVER OBSTACLE 3   7. GAIT WITH NARROW BASE OF SUPPORT 1   8. GAIT WITH EYES CLOSED 2   9. AMBULATING BACKWARDS 3   10. STEPS 3   Total Functional Gait Assessment Score   TOTAL SCORE: (MAXIMUM SCORE 30) 24       Functional Gait Assessment (FGA): The FGA assesses postural stability during various walking tasks.   Gait assistive device used: none      Patient Score: 24/30 - Pt not at risk for falling 2' >22/30 points. However, demonstrating dynamic balance impairment on vestibular and NBOS conditions. Plan to address with PT intervention & HEP. Pt in support.    Scores of <22/30 have been correlated with predicting falls in community-dwelling older adults according to Tere & Terrell 2010.   Scores of <18/30 have been correlated with increased risk for falls in patients with Parkinsons Disease according to GoldbergBehzad, King et al 2014.  Minimal Detectable Change for patients with acute/chronic stroke = 4.2 according to Thicamelia & Ritschel 2009  Minimal Detectable Change for patients with vestibular disorder = 8 according to Tere & Terrell 2010     Assessment (rationale for performing, application to patient s function & care plan): s/p BPPV with demonstrated balance impairments, repeated as has demonstrated improved gait, balance and overall functional mobility since initial assessment.

## 2023-09-28 ENCOUNTER — OFFICE VISIT (OUTPATIENT)
Dept: PEDIATRICS | Facility: CLINIC | Age: 73
End: 2023-09-28
Payer: COMMERCIAL

## 2023-09-28 VITALS
HEART RATE: 93 BPM | RESPIRATION RATE: 12 BRPM | OXYGEN SATURATION: 100 % | SYSTOLIC BLOOD PRESSURE: 138 MMHG | WEIGHT: 154.9 LBS | HEIGHT: 62 IN | TEMPERATURE: 97.8 F | DIASTOLIC BLOOD PRESSURE: 70 MMHG | BODY MASS INDEX: 28.5 KG/M2

## 2023-09-28 DIAGNOSIS — Z12.31 ENCOUNTER FOR SCREENING MAMMOGRAM FOR BREAST CANCER: ICD-10-CM

## 2023-09-28 DIAGNOSIS — Z00.00 ENCOUNTER FOR MEDICARE ANNUAL WELLNESS EXAM: Primary | ICD-10-CM

## 2023-09-28 PROCEDURE — G0439 PPPS, SUBSEQ VISIT: HCPCS | Performed by: PHYSICIAN ASSISTANT

## 2023-09-28 ASSESSMENT — ENCOUNTER SYMPTOMS
JOINT SWELLING: 0
NAUSEA: 0
HEARTBURN: 0
BREAST MASS: 0
DYSURIA: 0
FREQUENCY: 0
NERVOUS/ANXIOUS: 0
MYALGIAS: 0
DIZZINESS: 1
HEADACHES: 0
PALPITATIONS: 0
ARTHRALGIAS: 1
ABDOMINAL PAIN: 0
SORE THROAT: 0
FEVER: 0
DIARRHEA: 0
PARESTHESIAS: 0
SHORTNESS OF BREATH: 0
CHILLS: 0
COUGH: 0
HEMATOCHEZIA: 0
EYE PAIN: 0
HEMATURIA: 0
CONSTIPATION: 0
WEAKNESS: 0

## 2023-09-28 ASSESSMENT — ACTIVITIES OF DAILY LIVING (ADL): CURRENT_FUNCTION: NO ASSISTANCE NEEDED

## 2023-09-28 ASSESSMENT — PAIN SCALES - GENERAL: PAINLEVEL: NO PAIN (0)

## 2023-09-28 NOTE — COMMUNITY RESOURCES LIST (ENGLISH)
09/28/2023   [facility] - Outpatient Clinics  N/A  For additional resource needs, please contact your health insurance member services or your primary care team.  Phone: [phone]   Email: N/A   Address: [address] [place] [address]   Hours: N/A        Hotlines and Helplines       Hotline - Housing crisis  1  [facility] (Main Office) - Emergency Services Distance: 3.79 miles      Phone/Virtual   [address] [place] [address]  Language: English  Hours: Mon - Sun Open 24 Hours   Phone: (972) 986-5752 Email: info@[facility].org Website: http://[facility].org     2  [facility] Distance: 10.41 miles      Phone/Virtual   [address] [place] [address]  Language: English  Hours: Mon - Sun Open 24 Hours   Phone: (568) 645-4615 Email: communications@[facility].org Website: https://[facility]-[place].org/oursaviourshousing/          Housing       Coordinated Entry access point  3  [facility] ([facility] - Housing Services Distance: 10.34 miles      In-Person   [address] [place] [address]  Language: English  Hours: Mon - Fri 9:00 AM - 5:00 PM  Fees: Free   Phone: (738) 902-2944 Email: housing@[facility].org Website: http://www.[facility].org/housing     4  [facility] Distance: 10.85 miles      In-Person, Phone/Virtual   [address] [place] [address]  Language: English, Swiss  Hours: Mon - Fri 8:30 AM - 4:30 PM  Fees: Free   Phone: (921) 252-6420 Email: info@[facility].org Website: https://www.[facility].org/locations/[facility]-clinic/     Drop-in center or day shelter  5  [facility] Distance: 10.76 miles      In-Person   [address] [place] [address]  Language: English  Hours: Mon - Fri 12:00 PM - 3:00 PM  Fees: Free   Phone: (726) 874-1973 Email: [employer] Website: http://peacehousecommunity.org/     6  [facility] [place] [facility]  [address] [facility] [place] [facility] Distance: 10.83 miles      In-Person   [address] [place] [address]  Language: English  Hours: Mon - Sun 9:00 AM - 5:30 PM  Fees: Free, Self Pay   Phone: (935) 342-7057 Email: info@[employer] Website: https://www.[employer]es/locations/[facility]-[place]/     Housing search assistance  7  [employer] - https://[employer]Factor/ Distance: 11.59 miles      Phone/Virtual   [address] [place] [address]  Language: English  Hours: Mon - Sun Open 24 Hours   Email: info@[employer] Website: https://netFactor     8  [employer] - Online housing search assistance Distance: 12.06 miles      Phone/Virtual   [address] [address]  Language: English, Hmong, [place] citizen, Panamanian  Hours: Mon - Sun Open 24 Hours   Phone: (796) 784-4899 Email: info@[employer].org Website: http://www.[employer] Solutionslink.org/     Shelter for families  9  [facility] Distance: 4.17 miles      In-Person   [address] [place] [address]  Language: English  Hours: Mon - Sun Open 24 Hours  Fees: Free, Sliding Fee   Phone: (496) 626-2500 Ext.1 Email: info@[facility] Website: http://www.[facility]     Shelter for individuals  10  [facility] ([facility])  Dale Burgos  [facility] Distance: 7.19 miles      In-Person   [address] [place] [address]  Language: English, Panamanian  Hours: Mon - Fri 8:00 AM - 4:30 PM  Fees: Free   Phone: (284) 217-8089 Email: info@[facility].org Website: http://www.[facility].org     11  [facility] Distance: 10.41 miles      In-Person   [address] [place] [address]  Language: English  Hours: Mon - Sun Open 24 Hours  Fees: Free   Phone: (743) 429-5926 Email: communications@[facility].org Website: https://[facility]-[place].org/oursaviourshousing/          Important Numbers & Websites       United  Way   211 211unitedway.org  Poison Control   (698) 988-6795 [place]po[place]  Suicide and Crisis Lifeline   988 988[facility].org  Childhelp [place] Child Abuse Hotline   363.795.5884 Childhelphotline.org  [place] Sexual Assault Hotline   (757) 746-4317 (HOPE) [facility] Runaway Safeline   (902) 788-1637 (RUNAWAY) [facility].org  Pregnancy & Postpartum Support [place]   Call/text 277-071-0571 Ppsupportmn.org  Substance Abuse National Helpline ([facility]   278-713-HELP (4076) Findtreatment.gov  Emergency Services   91

## 2023-09-28 NOTE — PATIENT INSTRUCTIONS
Patient Education   Personalized Prevention Plan  You are due for the preventive services outlined below.  Your care team is available to assist you in scheduling these services.  If you have already completed any of these items, please share that information with your care team to update in your medical record.  Health Maintenance Due   Topic Date Due     COVID-19 Vaccine (6 - Moderna series) 01/29/2023     ANNUAL REVIEW OF HM ORDERS  07/13/2023        Patient Education   Personalized Prevention Plan  You are due for the preventive services outlined below.  Your care team is available to assist you in scheduling these services.  If you have already completed any of these items, please share that information with your care team to update in your medical record.  Health Maintenance Due   Topic Date Due     COVID-19 Vaccine (6 - Moderna series) 01/29/2023     ANNUAL REVIEW OF HM ORDERS  07/13/2023

## 2023-09-28 NOTE — PROGRESS NOTES
"SUBJECTIVE:   Lolis is a 73 year old who presents for Preventive Visit.      9/28/2023     9:18 AM   Additional Questions   Roomed by Saira Conner CMA   Accompanied by DAVID       Are you in the first 12 months of your Medicare coverage?  No    Healthy Habits:     In general, how would you rate your overall health?  Good    Frequency of exercise:  4-5 days/week    Duration of exercise:  45-60 minutes    Do you usually eat at least 4 servings of fruit and vegetables a day, include whole grains    & fiber and avoid regularly eating high fat or \"junk\" foods?  Yes    Taking medications regularly:  Yes    Medication side effects:  None    Ability to successfully perform activities of daily living:  No assistance needed    Home Safety:  No safety concerns identified    Hearing Impairment:  No hearing concerns    In the past 6 months, have you been bothered by leaking of urine?  No    In general, how would you rate your overall mental or emotional health?  Excellent    Additional concerns today:  No      Today's PHQ-2 Score:       9/28/2023     9:09 AM   PHQ-2 ( 1999 Pfizer)   Q1: Little interest or pleasure in doing things 0   Q2: Feeling down, depressed or hopeless 0   PHQ-2 Score 0   Q1: Little interest or pleasure in doing things Not at all   Q2: Feeling down, depressed or hopeless Not at all   PHQ-2 Score 0           Have you ever done Advance Care Planning? (For example, a Health Directive, POLST, or a discussion with a medical provider or your loved ones about your wishes): No, advance care planning information given to patient to review.  Advanced care planning was discussed at today's visit.     Fall risk  Fallen 2 or more times in the past year?: No  Any fall with injury in the past year?: No    Cognitive Screening   1) Repeat 3 items (Leader, Season, Table)    2) Clock draw: NORMAL  3) 3 item recall: Recalls 3 objects  Results: 3 items recalled: COGNITIVE IMPAIRMENT LESS LIKELY    Mini-CogTM Copyright TEDDY Croft. " Licensed by the author for use in [facility]; reprinted with permission (helena@[facility]). All rights reserved.        Reviewed and updated as needed this visit by clinical staff                  Reviewed and updated as needed this visit by Provider                 Social History     Tobacco Use     Smoking status: Never     Smokeless tobacco: Never   Substance Use Topics     Alcohol use: No     Alcohol/week: 0.0 standard drinks of alcohol             9/21/2023    11:59 AM   Alcohol Use   Prescreen: >3 drinks/day or >7 drinks/week? No     Do you have a current opioid prescription? No  Do you use any other controlled substances or medications that are not prescribed by a provider? None              Current providers sharing in care for this patient include:   Patient Care Team:  Edyta Degroot PA-C as PCP - General (Physician Assistant)  Manuela Sepulveda MD as Assigned PCP  Lupe Hoff OD as Assigned Surgical Provider    The following health maintenance items are reviewed in Epic and correct as of today:  Health Maintenance   Topic Date Due     COVID-19 Vaccine (6 - Moderna series) 01/29/2023     MEDICARE ANNUAL WELLNESS VISIT  07/13/2023     ANNUAL REVIEW OF HM ORDERS  07/13/2023     FALL RISK ASSESSMENT  09/28/2024     MAMMO SCREENING  07/11/2025     DEXA  07/20/2025     LIPID  07/13/2027     ADVANCE CARE PLANNING  07/13/2027     DTAP/TDAP/TD IMMUNIZATION (3 - Td or Tdap) 10/08/2028     COLORECTAL CANCER SCREENING  03/04/2031     HEPATITIS C SCREENING  Completed     PHQ-2 (once per calendar year)  Completed     INFLUENZA VACCINE  Completed     Pneumococcal Vaccine: 65+ Years  Completed     ZOSTER IMMUNIZATION  Completed     IPV IMMUNIZATION  Aged Out     HPV IMMUNIZATION  Aged Out     MENINGITIS IMMUNIZATION  Aged Out          Annual medicare exam.  No concerns. Had vertigo sxs and is going to PT, much better.       Review of Systems   Constitutional:  Negative for chills and fever.  "  HENT:  Negative for congestion, ear pain, hearing loss and sore throat.    Eyes:  Positive for visual disturbance. Negative for pain.   Respiratory:  Negative for cough and shortness of breath.    Cardiovascular:  Negative for chest pain, palpitations and peripheral edema.   Gastrointestinal:  Negative for abdominal pain, constipation, diarrhea, heartburn, hematochezia and nausea.   Breasts:  Negative for tenderness, breast mass and discharge.   Genitourinary:  Negative for dysuria, frequency, genital sores, hematuria, pelvic pain, urgency, vaginal bleeding and vaginal discharge.   Musculoskeletal:  Positive for arthralgias. Negative for joint swelling and myalgias.   Skin:  Negative for rash.   Neurological:  Positive for dizziness. Negative for weakness, headaches and paresthesias.   Psychiatric/Behavioral:  Negative for mood changes. The patient is not nervous/anxious.          OBJECTIVE:   There were no vitals taken for this visit. Estimated body mass index is 28.31 kg/m  as calculated from the following:    Height as of 9/14/23: 1.575 m (5' 2\").    Weight as of 9/14/23: 70.2 kg (154 lb 12.8 oz).  Physical Exam  GENERAL: healthy, alert and no distress  EYES: Eyes grossly normal to inspection, PERRL and conjunctivae and sclerae normal  HENT: ear canals and TM's normal, nose and mouth without ulcers or lesions  NECK: no adenopathy, no asymmetry, masses, or scars and thyroid normal to palpation  RESP: lungs clear to auscultation - no rales, rhonchi or wheezes  CV: regular rate and rhythm, normal S1 S2, no S3 or S4, no murmur, click or rub, no peripheral edema and peripheral pulses strong  ABDOMEN: soft, nontender, no hepatosplenomegaly, no masses and bowel sounds normal  MS: no gross musculoskeletal defects noted, no edema  SKIN: no suspicious lesions or rashes  NEURO: Normal strength and tone, mentation intact and speech normal  PSYCH: mentation appears normal, affect normal/bright  LYMPH: no cervical, " "supraclavicular, axillary, or inguinal adenopathy    Diagnostic Test Results:  Labs reviewed in Epic    ASSESSMENT / PLAN:   Lolis was seen today for physical.    Diagnoses and all orders for this visit:    Encounter for Medicare annual wellness exam  - doing well. Happy, very active and social. No labs today. Not due for refills. She will get Covid vac at  pharmacy  Other orders  -     PRIMARY CARE FOLLOW-UP SCHEDULING; Future              COUNSELING:  Reviewed preventive health counseling, as reflected in patient instructions      BMI:   Estimated body mass index is 28.31 kg/m  as calculated from the following:    Height as of 9/14/23: 1.575 m (5' 2\").    Weight as of 9/14/23: 70.2 kg (154 lb 12.8 oz).   Weight management plan: Discussed healthy diet and exercise guidelines      She reports that she has never smoked. She has never used smokeless tobacco.      Appropriate preventive services were discussed with this patient, including applicable screening as appropriate for cardiovascular disease, diabetes, osteopenia/osteoporosis, and glaucoma.  As appropriate for age/gender, discussed screening for colorectal cancer, prostate cancer, breast cancer, and cervical cancer. Checklist reviewing preventive services available has been given to the patient.    Reviewed patients plan of care and provided an AVS. The Basic Care Plan (routine screening as documented in Health Maintenance) for Lolis meets the Care Plan requirement. This Care Plan has been established and reviewed with the Patient.          Edyta Degroot PA-C  [facility] [place]    Identified Health Risks:  I have reviewed Opioid Use Disorder and Substance Use Disorder risk factors and made any needed referrals.   "

## 2023-09-28 NOTE — COMMUNITY RESOURCES LIST (ENGLISH)
09/28/2023   [facility] Private Outlet  N/A  For questions about this resource list or additional care needs, please contact your primary care clinic or care manager.  Phone: [phone]   Email: N/A   Address: [facility] [place] [address]   Hours: N/A        Hotlines and Helplines       Hotline - Housing crisis  1  [facility] (Main Office) - Emergency Services Distance: 3.79 miles      Phone/Virtual   [address] [place] [address]  Language: English  Hours: Mon - Sun Open 24 Hours   Phone: (973) 708-7407 Email: info@[facility].org Website: http://[facility].org     2  [facility] Distance: 10.41 miles      Phone/Virtual   [address] [place] [address]  Language: English  Hours: Mon - Sun Open 24 Hours   Phone: (997) 116-9121 Email: communications@[facility].org Website: https://[facility]-[place].org/oursaviourshousing/          Housing       Coordinated Entry access point  3  [facility] ([facility] - Housing Services Distance: 10.34 miles      In-Person   [address] [place] [address]  Language: English  Hours: Mon - Fri 9:00 AM - 5:00 PM  Fees: Free   Phone: (111) 601-2255 Email: housing@[facility].org Website: http://www.[facility].org/housing     4  [facility] Distance: 10.85 miles      In-Person, Phone/Virtual   [address] [place] [address]  Language: English, Indonesian  Hours: Mon - Fri 8:30 AM - 4:30 PM  Fees: Free   Phone: (442) 711-8269 Email: info@[facility].org Website: https://www.[facility].org/locations/[facility]-cl[facility]/     Drop-in center or day shelter  5  [facility] Distance: 10.76 miles      In-Person   [address] [place] [address]  Language: English  Hours: Mon - Fri 12:00 PM - 3:00 PM  Fees: Free   Phone: (559) 450-5668 Email: esthelaPogoseatcomjamari@[employer].com Website: http://[employer].org/     6  [facility]  [facility] [place] [facility] Distance: 10.83 miles      In-Person   [address] [place] [address]  Language: English  Hours: Mon - Sun 9:00 AM - 5:30 PM  Fees: Free, Self Pay   Phone: (732) 584-1890 Email: info@[employer] Website: https://www.[employer]/locations/[facility]/     Housing search assistance  7  [facility] - Rental Homes for Future Homebuyers Program Distance: 4.27 miles      Phone/Virtual   [address] [place] [address]  Language: English  Hours: Mon - Fri 8:00 AM - 4:30 PM  Fees: Free   Phone: (572) 511-5177 Email: hra@[facility] Website: https://www.[facility]/hra/[facility]-housing-and-dznhjijolpktc-ubiepkigq-eyg     8  [facility] - Online Housing Search Assistance Distance: 7.68 miles      Phone/Virtual   [address] [place] [address]  Language: English  Hours: Mon - Sun Open 24 Hours  Fees: Free   Phone: (279) 647-7055 Email: findhouaubree@[facility].[employer] Website: https://[facility].[employer]/     Shelter for families  9  [facility] Distance: 4.17 miles      In-Person   [address] [place] [address]  Language: English  Hours: Mon - Sun Open 24 Hours  Fees: Free, Sliding Fee   Phone: (993) 766-6709 Ext.1 Email: info@[facility] Website: http://www.[facility].org     Shelter for individuals  10  [facility] ([facility]) [facility] Distance: 7.19 miles      In-Person   [address], [place] [address]  Language: English, Albanian  Hours: Mon - Fri 8:00 AM - 4:30 PM  Fees: Free   Phone: (438) 992-9792 Email: info@[facility].org Website: http://www.[facility].org     11  [facility] Distance: 10.41 miles      In-Person   [address] [place] [address]  Language: English  Hours: Mon - Sun Open 24 Hours  Fees: Free   Phone: (072)  343-3803 Email: communications@[facility].org Website: https://[place].org/oursaviourshousing/          Important Numbers & Websites       Emergency Services   911  [facility] Services   311  Poison Control   (638) 752-9669  Suicide Prevention Lifeline   (846) 118-8031 (TALK)  Child Abuse Hotline   (153) 368-3143 (4-A-Child)  Sexual Assault Hotline   (257) 825-3208 (HOPE)  National Runaway Safeline   (958) 132-9966 (RUNAWAY)  All-Options Talkline   (277) 662-7010  Substance Abuse Referral   (854) 419-8217 (HELP)

## 2023-10-05 ENCOUNTER — THERAPY VISIT (OUTPATIENT)
Dept: PHYSICAL THERAPY | Facility: CLINIC | Age: 73
End: 2023-10-05
Payer: COMMERCIAL

## 2023-10-05 DIAGNOSIS — H81.10 BENIGN PAROXYSMAL POSITIONAL VERTIGO, UNSPECIFIED LATERALITY: ICD-10-CM

## 2023-10-05 DIAGNOSIS — R42 VERTIGO: Primary | ICD-10-CM

## 2023-10-05 PROCEDURE — 97112 NEUROMUSCULAR REEDUCATION: CPT | Mod: GP | Performed by: PHYSICAL THERAPIST

## 2023-11-07 NOTE — PROGRESS NOTES
10/05/23 0500   Appointment Info   Signing clinician's name / credentials Rachelle Martini, PT, DPT   Visits Used 3   Medical Diagnosis Vertigo   PT Tx Diagnosis BPPV; dizziness with head movement/change of body position   Quick Adds Certification   Progress Note/Certification   Start of Care Date 09/19/23   Onset of illness/injury or Date of Surgery 07/12/23   Therapy Frequency 1x/week   Predicted Duration 60 days   Certification date from 09/19/23   Certification date to 11/17/23   Progress Note Due Date   (10th visit)   GOALS   PT Goals 2;3;4   PT Goal 1   Goal Identifier HEP   Goal Description Pt will demonstrate IND with BPPV repositioning techniques to promote reduction of vestibular symptoms and improve safety with functional mobility, if symptoms return.   Goal Progress 10/5/2023: Progressed pt's HEP; see tx detail below.   Target Date 11/17/23   PT Goal 2   Goal Identifier MET: DHI   Goal Description Pt will report 18pt decrease on DHI (MCID), for subjective reduction of dizziness symptoms with completion of daily activities.   Goal Progress 9/27/2023: Pt with 14/100 points; significant reduction of her dizziness symptoms.   Target Date 11/17/23   Date Met 09/27/23   PT Goal 3   Goal Identifier MET: Positional Testing   Goal Description Pt will complete BPPV positional testing without observed nystagmus and/or increase of dizziness.   Goal Progress 9/27/2023: Pt asymptomatic with positional testing.   Target Date 10/18/23   Date Met 09/27/23   PT Goal 4   Goal Identifier MET: DVA   Goal Description The patient will score within 2 lines of SVA with performance of DVA to demonstrate that her vestibular system is functioning optimally and is able to support gaze stability within a functional range.   Rationale to maximize safety and independence within the community   Goal Progress 9/27/2023: Pt with 1 line loss on DVA + asyptomatic. She's demonstrating typical VOR function.   Target Date 11/17/23   Date Met  09/27/23   Subjective Report   Subjective Report Arriving today and states overall things are better, still feels like her depth perception is off. Was able to play pickle ball this morning (and for the past few days) and resume exercise classes.  Patient reporting 85% improvement.   Treatment Interventions (PT)   Interventions Neuromuscular Re-education   Neuromuscular Re-education   Neuromuscular re-ed of mvmt, balance, coord, kinesthetic sense, posture, proprioception minutes (95586) 23   Skilled Intervention patient education, HEP, cues   Patient Response/Progress Patient reporting comfort with current HEP and demonstrated improved ability to perform tandem walking.  Trial of squat with across body rotation throwing multi-colored ball against wall and catching it x 10 reps each direction and varied stance positions (normal, narrowed and modified tandem) while looking at marcial ball x 1 rep each; patient instructed to add to HEP.  Discussed returning to her previous activities are a form of functional exercises addressing her concerns and that continuing with them should help with continued resolution of symptoms.   Education   Learner/Method Patient;Listening;Demonstration;No Barriers to Learning   Plan   Home program gait with HHT, gait with VHT, tandem ambulation, bal feet together with EC, mardsen ball, squat with trunk rotation tossing ball   Updates to plan of care Will hold chart open for up to 30 days incase symptoms return and patient will benefit from additional treatment, will complete formal discharge if patient does not contact clinic to schedule   Plan for next session if patient returns reassess positionals; oculomotor screen; f/u with HEP   Total Session Time   Timed Code Treatment Minutes 23   Total Treatment Time (sum of timed and untimed services) 23       DISCHARGE  Reason for Discharge: Patient has met all goals as patient has not returned and/or scheduled any additional visits.    Equipment  Issued: N/A    Discharge Plan: Patient to continue home program.    Referring Provider:  No ref. provider found

## 2023-12-29 ENCOUNTER — OFFICE VISIT (OUTPATIENT)
Dept: PEDIATRICS | Facility: CLINIC | Age: 73
End: 2023-12-29
Payer: COMMERCIAL

## 2023-12-29 VITALS
WEIGHT: 158.2 LBS | DIASTOLIC BLOOD PRESSURE: 75 MMHG | TEMPERATURE: 98.1 F | RESPIRATION RATE: 20 BRPM | SYSTOLIC BLOOD PRESSURE: 111 MMHG | OXYGEN SATURATION: 98 % | BODY MASS INDEX: 28.94 KG/M2 | HEART RATE: 94 BPM

## 2023-12-29 DIAGNOSIS — M51.26 HERNIATED INTERVERTEBRAL DISC OF LUMBAR SPINE: Primary | ICD-10-CM

## 2023-12-29 DIAGNOSIS — Z01.818 PREOP GENERAL PHYSICAL EXAM: ICD-10-CM

## 2023-12-29 PROCEDURE — 99214 OFFICE O/P EST MOD 30 MIN: CPT | Performed by: PHYSICIAN ASSISTANT

## 2023-12-29 RX ORDER — TRAMADOL HYDROCHLORIDE 50 MG/1
TABLET ORAL
COMMUNITY
Start: 2023-12-21 | End: 2023-12-29

## 2023-12-29 RX ORDER — MELOXICAM 7.5 MG/1
TABLET ORAL
COMMUNITY
Start: 2023-12-06 | End: 2023-12-29

## 2023-12-29 RX ORDER — CYCLOBENZAPRINE HCL 5 MG
TABLET ORAL
COMMUNITY
Start: 2023-11-14 | End: 2023-12-29

## 2023-12-29 RX ORDER — METHYLPREDNISOLONE 4 MG
TABLET, DOSE PACK ORAL
COMMUNITY
Start: 2023-11-14 | End: 2023-12-29

## 2023-12-29 RX ORDER — GABAPENTIN 300 MG/1
CAPSULE ORAL
COMMUNITY
Start: 2023-12-22 | End: 2023-12-29

## 2023-12-29 ASSESSMENT — PAIN SCALES - GENERAL: PAINLEVEL: WORST PAIN (10)

## 2023-12-29 NOTE — PROGRESS NOTES
[facility]  [address]  [address]  [place] [address]  Phone: [phone]  Fax: [phone]  Primary Provider: Edyta Degroot  Pre-op Performing Provider: SIRENA NAGY  PREOPERATIVE EVALUATION:  Today's date: 12/29/2023    Lolis is a 73 year old, presenting for the following:  Pre-Op Exam        12/29/2023    11:06 AM   Additional Questions   Roomed by Marilyn Tapia   Accompanied by N/A       Surgical Information:  Surgery/Procedure: Left LA-5 Laminectomy/discectomy   Surgery Location: [facility]   Surgeon: Dr. Julian   Surgery Date: 1/2/24  Time of Surgery: 12:00PM  Where patient plans to recover: At home with family  Fax number for surgical facility: [phone]    Assessment & Plan     The proposed surgical procedure is considered INTERMEDIATE risk.    Herniated intervertebral disc of lumbar spine    Preop general physical exam       - No identified additional risk factors other than previously addressed    Antiplatelet or Anticoagulation Medication Instructions:   - Patient is on no antiplatelet or anticoagulation medications.    Additional Medication Instructions:  Patient is on no additional chronic medications    RECOMMENDATION:  APPROVAL GIVEN to proceed with proposed procedure, without further diagnostic evaluation.  Subjective       HPI related to upcoming procedure: herniated disc at L4 s/p fall playing pickleball. Surgery recommended.        12/27/2023     1:21 PM   Preop Questions   1. Have you ever had a heart attack or stroke? No   2. Have you ever had surgery on your heart or blood vessels, such as a stent placement, a coronary artery bypass, or surgery on an artery in your head, neck, heart, or legs? No   3. Do you have chest pain with activity? No   4. Do you have a history of  heart failure? No   5. Do you currently have a cold, bronchitis or symptoms of other infection? No   6. Do you have a cough, shortness of breath, or  wheezing? No   7. Do you or anyone in your family have previous history of blood clots? YES - sister with brca   8. Do you or does anyone in your family have a serious bleeding problem such as prolonged bleeding following surgeries or cuts? No   9. Have you ever had problems with anemia or been told to take iron pills? No   10. Have you had any abnormal blood loss such as black, tarry or bloody stools, or abnormal vaginal bleeding? No   11. Have you ever had a blood transfusion? No   12. Are you willing to have a blood transfusion if it is medically needed before, during, or after your surgery? Yes   13. Have you or any of your relatives ever had problems with anesthesia? YES - nauseated with anesthesia   14. Do you have sleep apnea, excessive snoring or daytime drowsiness? No   15. Do you have any artifical heart valves or other implanted medical devices like a pacemaker, defibrillator, or continuous glucose monitor? No   16. Do you have artificial joints? No   17. Are you allergic to latex? No     Review of Systems  Constitutional, neuro, ENT, endocrine, pulmonary, cardiac, gastrointestinal, genitourinary, musculoskeletal, integument and psychiatric systems are negative, except as otherwise noted.    Patient Active Problem List    Diagnosis Date Noted    Squamous cell skin cancer 10/09/2018     Priority: Medium    WENDY (obstructive sleep apnea) 07/31/2017     Priority: Medium    Dry mouth 10/03/2016     Priority: Medium    Advanced directives, counseling/discussion 08/17/2011     Priority: Medium     Patient states has Advance Directive and will bring in a copy to clinic.        CARDIOVASCULAR SCREENING; LDL GOAL LESS THAN 160 10/31/2010     Priority: Medium      Past Medical History:   Diagnosis Date    Arthritis     Chronic pain     sciatic pain left leg to toes    Numbness and tingling     left leg    PONV (postoperative nausea and vomiting)     hx nausea postop     Past Surgical History:   Procedure Laterality  Date    COLONOSCOPY N/A 03/04/2021    Procedure: COLONOSCOPY;  Surgeon: Gerard Siddiqi MD;  Location:  GI    ENHANCE LASER REFRACTIVE BILATERAL EXISTING PT IN [facility]      EYE SURGERY  August 2023    Cataract surgery    HEAD & NECK SURGERY  2003    Hip/neck bone transfer fusion    HYSTERECTOMY, PAP NO LONGER INDICATED      SEPTOPLASTY, TURBINOPLASTY, COMBINED  01/20/2012    Procedure:COMBINED SEPTOPLASTY, TURBINOPLASTY; SEPTOPLASTY, TURBINATE Surgery ; Surgeon:OPAL FUENTES; Location:[facility] OR    titanium neck      titanium right hip Right     cut bone out of hip to place in neck, so needed plate in righthip    TONSILLECTOMY & ADENOIDECTOMY  age 6    VASCULAR SURGERY  2010    [facility] NONSPECIFIC PROCEDURE  2003    cervical spine surgery     [facility] NONSPECIFIC PROCEDURE      varacose carorll removal right     Current Outpatient Medications   Medication Sig Dispense Refill    Calcium Carb-Cholecalciferol (CALCIUM 1000 + D) 1000-800 MG-UNIT TABS       cetirizine (ZYRTEC) 10 MG tablet Take 1 tablet (10 mg) by mouth every evening 30 tablet 1    CINNAMON PO Take 1,000 mg by mouth      Glucosamine-Chondroit-Vit C-Mn (GLUCOSAMINE CHONDROITIN 1500 COMPLEX) CAPS       magnesium gluconate (MAGONATE) 500 (27 Mg) MG tablet Take 1 tablet (500 mg) by mouth 2 times daily      melatonin 5 MG tablet Take 1 tablet (5 mg) by mouth nightly as needed for sleep      multivitamin (CENTRUM SILVER) tablet Take 1 tablet by mouth daily      Turmeric 500 MG CAPS       vitamin D3 (CHOLECALCIFEROL) 50 mcg (2000 units) tablet Take 1 tablet (50 mcg) by mouth daily         Allergies   Allergen Reactions    Mold Anxiety, Headache, Shortness Of Breath and Visual Disturbance    Actical Difficulty breathing, Swelling and Visual Disturbance    Doxycycline      Itchy rash    Dust Mites     Tramadol Nausea        Social History     Tobacco Use    Smoking status: Never    Smokeless tobacco: Never    Tobacco comments:     Never smoked   Substance Use Topics     Alcohol use: No     History   Drug Use No         Objective     /75 (BP Location: Right arm, Patient Position: Sitting, Cuff Size: Adult Regular)   Pulse 94   Temp 98.1  F (36.7  C) (Tympanic)   Resp 20   Wt 71.8 kg (158 lb 3.2 oz)   SpO2 98%   BMI 28.94 kg/m      Physical Exam    GENERAL APPEARANCE: healthy, alert and no distress     EYES: EOMI, PERRL     HENT: nose and mouth without ulcers or lesions     NECK: no adenopathy, no asymmetry     RESP: lungs clear to auscultation - no rales, rhonchi or wheezes     CV: regular rates and rhythm, normal S1 S2, no S3 or S4 and no murmur, click or rub     ABDOMEN:  soft, nontender     MS: extremities normal- no gross deformities noted, no evidence of inflammation in joints, FROM in all extremities.     SKIN: no suspicious lesions or rashes     NEURO: Normal strength and tone, sensory exam grossly normal, mentation intact and speech normal     PSYCH: mentation appears normal. and affect normal/bright     LYMPHATICS: No cervical adenopathy         Diagnostics:  No labs were ordered during this visit.   No EKG this visit, completed in the last 90 days.    Signed Electronically by: Juan Jose Fallon PA-C  Copy of this evaluation report is provided to requesting physician.

## 2024-03-06 ENCOUNTER — TRANSCRIBE ORDERS (OUTPATIENT)
Dept: OTHER | Age: 74
End: 2024-03-06

## 2024-03-06 DIAGNOSIS — G89.29 OTHER CHRONIC PAIN: ICD-10-CM

## 2024-03-06 DIAGNOSIS — M54.2 CERVICALGIA: Primary | ICD-10-CM

## 2024-03-08 ENCOUNTER — THERAPY VISIT (OUTPATIENT)
Dept: PHYSICAL THERAPY | Facility: CLINIC | Age: 74
End: 2024-03-08
Attending: INTERNAL MEDICINE
Payer: COMMERCIAL

## 2024-03-08 DIAGNOSIS — M54.2 CERVICALGIA: ICD-10-CM

## 2024-03-08 DIAGNOSIS — G47.33 OSA (OBSTRUCTIVE SLEEP APNEA): Primary | ICD-10-CM

## 2024-03-08 DIAGNOSIS — G89.29 OTHER CHRONIC PAIN: ICD-10-CM

## 2024-03-08 PROCEDURE — 97161 PT EVAL LOW COMPLEX 20 MIN: CPT | Mod: GP | Performed by: PHYSICAL THERAPIST

## 2024-03-08 PROCEDURE — 97112 NEUROMUSCULAR REEDUCATION: CPT | Mod: GP | Performed by: PHYSICAL THERAPIST

## 2024-03-08 PROCEDURE — 97110 THERAPEUTIC EXERCISES: CPT | Mod: GP | Performed by: PHYSICAL THERAPIST

## 2024-03-08 NOTE — PROGRESS NOTES
PHYSICAL THERAPY EVALUATION  Type of Visit: Evaluation    See electronic medical record for Abuse and Falls Screening details.    Subjective       Presenting condition or subjective complaint: 15 years ago, pt had a skiing injury and required a surgery.  Pt has had troubles for many years since that time.  Pt has gone to a chiropractor to manage her symptoms which didn't seem to help.  Pt is eager to find some home exercises to help get her neck to a better place.  Pt also is working on vertigo with dizzy/balance PT.  Pt does massages as well every month to help manage her symptoms.  Pt rates pain 7/10 in her neck and especially at the base of her skull.  Pt isn't doing any neck exrercises specifically.  Pt is currently walking, playing Sile[employer]ball and [employer] classes.  Goal is to get more ROM with her neck, decrease pain, and decrase episodes of vertigo.  Date of onset: 03/06/24    Relevant medical history: Arthritis; Dizziness; Migraines or headaches; Neck injury; Osteoarthritis; Overweight; Pain at night or rest; Sleep disorder like apnea   Dates & types of surgery: Neck 2003; Septoplasty 2012; Endoscopy 2012; Herniated Disc 2024    Prior diagnostic imaging/testing results: MRI; CT scan; X-ray; Bone scan     Prior therapy history for the same diagnosis, illness or injury: Yes Therapy for vertigo but not specifically my neck    Prior Level of Function  Transfers:   Ambulation:   ADL:   IADL:     Living Environment  Social support: Alone   Type of home: Apartment/condo   Stairs to enter the home: No       Ramp: No   Stairs inside the home: No       Help at home: None  Equipment owned:       Employment: Not Applicable    Hobbies/Interests: Pickleball, [employer] class, walking, reading, traveling    Patient goals for therapy: Turn my neck without pain, live without dizziness and pain       Objective   CERVICAL SPINE EVALUATION  PAIN: Pain Level at Rest: 4/10  Pain Level with Use: 7/10  Pain Location: cervical  spine  Pain Quality: Aching  Pain Frequency: intermittent  Pain is Worst: daytime  Pain is Exacerbated By: turning head, looking up/down  Pain is Relieved By: rest  Pain Progression: Worsened  INTEGUMENTARY (edema, incisions):   POSTURE: Sitting Posture: Rounded shoulders, Forward head  GAIT:   Weightbearing Status:   Assistive Device(s):   Gait Deviations:   BALANCE/PROPRIOCEPTION:   WEIGHTBEARING ALIGNMENT:   ROM:   (Degrees) Left AROM Right AROM    Cervical Flexion Min loss    Cervical Extension Mod loss erp    Cervical Side bend Mod loss erp Mod loss erp    Cervical Rotation Mod loss erp Mod loss erp    Cervical Protrusion wnl    Cervical Retraction Min loss    Thoracic Flexion wnl    Thoracic Extension Min/mod loss    Thoracic Rotation       Left AROM Left PROM Right AROM Right PROM   Shoulder Flexion       Shoulder Extension       Shoulder Abduction       Shoulder Adduction       Shoulder IR       Shoulder ER       Shoulder Horiz Abduction       Shoulder Horiz Adduction       Pain:   End Feel:     MYOTOMES:   DTR S:   CORD SIGNS:   DERMATOMES:   NEURAL TENSION:   FLEXIBILITY:    SPECIAL TESTS:   PALPATION:  TTP: B UT and sub-occipitals  SPINAL SEGMENTAL CONCLUSIONS:       Assessment & Plan   CLINICAL IMPRESSIONS  Medical Diagnosis: Cervicalgia    Treatment Diagnosis: neck pain   Impression/Assessment: Patient is a 74 year old female with neck complaints.  The following significant findings have been identified: Pain, Decreased ROM/flexibility, Decreased strength, Impaired muscle performance, Decreased activity tolerance, and Impaired posture. These impairments interfere with their ability to perform self care tasks, work tasks, recreational activities, household chores, and driving  as compared to previous level of function.     Clinical Decision Making (Complexity):  Clinical Presentation: Stable/Uncomplicated  Clinical Presentation Rationale: based on medical and personal factors listed in PT  evaluation  Clinical Decision Making (Complexity): Low complexity    PLAN OF CARE  Treatment Interventions:  Modalities: Mechanical Traction  Interventions: Manual Therapy, Neuromuscular Re-education, Therapeutic Activity, Therapeutic Exercise, Self-Care/Home Management    Long Term Goals     PT Goal 1  Goal Identifier: driving  Goal Description: Pt will be able to look over either shoulder while driving, pain free  Rationale: to maximize safety and independence with performance of ADLs and functional tasks  Target Date: 05/03/24      Frequency of Treatment: 1x/week  Duration of Treatment: 8 weeks    Recommended Referrals to Other Professionals:   Education Assessment:   Learner/Method: Patient;Listening;Demonstration;Pictures/Video  Education Comments: Educated pt on findings of the evaluation and reasoning for plan of care.  Pt was able to understand how treatment plan aligns with goals.    Risks and benefits of evaluation/treatment have been explained.   Patient/Family/caregiver agrees with Plan of Care.     Evaluation Time:     PT Eval, Low Complexity Minutes (61471): 15     Signing Clinician: Tod Heck PT      [facility]                                                                                   OUTPATIENT PHYSICAL THERAPY      PLAN OF TREATMENT FOR OUTPATIENT REHABILITATION   Patient's Last Name, First Name, Lolis Pyle YOB: 1950   Provider's Name   [facility]   Medical Record No.  [id number]     Onset Date: 03/06/24  Start of Care Date: 03/08/24     Medical Diagnosis:  Cervicalgia      PT Treatment Diagnosis:  neck pain Plan of Treatment  Frequency/Duration: 1x/week/ 8 weeks    Certification date from 03/08/24 to 05/03/24         See note for plan of treatment details and functional goals     Tod Heck PT                         I CERTIFY THE NEED FOR THESE SERVICES FURNISHED UNDER        THIS PLAN OF  TREATMENT AND WHILE UNDER MY CARE     (Physician attestation of this document indicates review and certification of the therapy plan).              Referring Provider:  Kristine Mercer    Initial Assessment  See Epic Evaluation- Start of Care Date: 03/08/24

## 2024-03-15 ENCOUNTER — THERAPY VISIT (OUTPATIENT)
Dept: PHYSICAL THERAPY | Facility: CLINIC | Age: 74
End: 2024-03-15
Attending: INTERNAL MEDICINE
Payer: COMMERCIAL

## 2024-03-15 DIAGNOSIS — M54.2 CERVICALGIA: Primary | ICD-10-CM

## 2024-03-15 PROCEDURE — 97110 THERAPEUTIC EXERCISES: CPT | Mod: GP | Performed by: PHYSICAL THERAPIST

## 2024-03-15 PROCEDURE — 97112 NEUROMUSCULAR REEDUCATION: CPT | Mod: GP | Performed by: PHYSICAL THERAPIST

## 2024-03-22 ENCOUNTER — THERAPY VISIT (OUTPATIENT)
Dept: PHYSICAL THERAPY | Facility: CLINIC | Age: 74
End: 2024-03-22
Attending: INTERNAL MEDICINE
Payer: COMMERCIAL

## 2024-03-22 DIAGNOSIS — M54.2 CERVICALGIA: Primary | ICD-10-CM

## 2024-03-22 PROCEDURE — 97112 NEUROMUSCULAR REEDUCATION: CPT | Mod: GP | Performed by: PHYSICAL THERAPIST

## 2024-03-22 PROCEDURE — 97110 THERAPEUTIC EXERCISES: CPT | Mod: GP | Performed by: PHYSICAL THERAPIST

## 2024-04-05 PROBLEM — M54.2 CERVICALGIA: Status: RESOLVED | Noted: 2024-03-08 | Resolved: 2024-04-05

## 2024-04-05 NOTE — PROGRESS NOTES
03/22/24 0500   Appointment Info   Signing clinician's name / credentials Yuko Luu, PT   Total/Authorized Visits 8   Visits Used 3   Medical Diagnosis Cervicalgia   PT Tx Diagnosis neck pain   Quick Adds Certification   Progress Note/Certification   Start of Care Date 03/08/24   Onset of illness/injury or Date of Surgery 03/06/24   Therapy Frequency 1x/week   Predicted Duration 8 weeks   Certification date from 03/08/24   Certification date to 05/03/24   Progress Note Due Date 05/03/24   Progress Note Completed Date 03/08/24   PT Goal 1   Goal Identifier driving   Goal Description Pt will be able to look over either shoulder while driving, pain free   Rationale to maximize safety and independence with performance of ADLs and functional tasks   Goal Progress making progress, less pain with turning and motion seems improved   Target Date 05/03/24   Subjective Report   Subjective Report Overall does feel better, exercises are going well. Overall pain is less, Less pain with pickle ball. still tightness with turning, but does feel like motion is improved   Treatment Interventions (PT)   Interventions Therapeutic Procedure/Exercise;Neuromuscular Re-education   Therapeutic Procedure/Exercise   Therapeutic Procedures: strength, endurance, ROM, flexibility minutes (34548) 32   PTRx Ther Proc 1 Cervical Retraction   PTRx Ther Proc 1 - Details x 5 x 2 with good form   PTRx Ther Proc 2 Upper Trapezius Stretch   PTRx Ther Proc 2 - Details x 15-20 sec x 2 reps right and left   PTRx Ther Proc 3 Levator Scapulae Stretch   PTRx Ther Proc 3 - Details x 15-20 sec x 2 right and left    PTRx Ther Proc 4 Cervical ROM Rotation   PTRx Ther Proc 4 - Details x 15-20 sec x 2 right and left  with slight end range overpressure   PTRx Ther Proc 5 Pec Stretch Doorway   PTRx Ther Proc 5 - Details x 20-30 sec hold able to do with arms at shoulder height with HEP and reports this feels good    PTRx Ther Proc 6 [place] Stretch   PTRx  Ther Proc 6 - Details x 15-20 sec hold x 3 left and right reports this feels good likes this for HEP   PTRx Ther Proc 7 Prone Arm Raise #2   PTRx Ther Proc 7 - Details x 10 with no pain cueing for starting with scapular retraction/depression then lift arms then head cueing for lifting up head but not looking up   PTRx Ther Proc 8 Shoulder Scaption Full Can   PTRx Ther Proc 8 - Details 1 lb x 10 added to HEP   PTRx Ther Proc 9 Shoulder Abduction   PTRx Ther Proc 9 - Details 1 lb x 10  added to HEP reports likes this exercise feels good to do   Skilled Intervention Verbal cues and demonstration used for proper performance of exercise including cues for proper form to decrease substitutions and isolate muscle being targeted; cues for slow and controlled motion both concentrically and eccentrically; cueing for good starting position and posture, and to ensure safe performance of motion   Neuromuscular Re-education   Neuromuscular re-ed of mvmt, balance, coord, kinesthetic sense, posture, proprioception minutes (89278) 8   Neuro Re-ed 1 neutral posture training, head/neck position, avoiding forward head   PTRx Neuro Re-ed 1 Scapular Retraction/Depression   PTRx Neuro Re-ed 1 - Details x 10 with good form   PTRx Neuro Re-ed 2 Shoulder Theraband Rows   PTRx Neuro Re-ed 2 - Details   red x 10 x 2 good form can progress to green once 10-15 x 2 with red is easier   Skilled Intervention Verbal cues and demonstration used for proper performance of exercise including cues for proper form to decrease substitutions and isolate muscle being targeted; cues for slow and controlled motion both concentrically and eccentrically; cueing for good starting position and posture, and to ensure safe performance of motion   Education   Learner/Method Patient;Listening;Demonstration;Pictures/Video   Education Comments prefers handouts for HEP   Plan   Home program see PTRX   Updates to plan of care issued updated handouts   Plan for next  session continue to progress with cervical stretching; upper trunk and scapular stabilization; thoracic extension   Total Session Time   Timed Code Treatment Minutes 40   Total Treatment Time (sum of timed and untimed services) 40         DISCHARGE  Reason for Discharge: Patient chooses to discontinue therapy.  4/5/2024: patient cancelled appointment for 4/19/2024 stating condition improved. Current objective and goal status not taken as patient cancelled remaining visits, status as noted above.     Equipment Issued: none    Discharge Plan: Patient to continue home program.    Referring Provider:  Kristine Mercer

## 2024-07-01 ENCOUNTER — OFFICE VISIT (OUTPATIENT)
Dept: OPTOMETRY | Facility: CLINIC | Age: 74
End: 2024-07-01
Payer: COMMERCIAL

## 2024-07-01 DIAGNOSIS — H52.12 MYOPIA OF LEFT EYE WITH ASTIGMATISM: Primary | ICD-10-CM

## 2024-07-01 DIAGNOSIS — H52.202 MYOPIA OF LEFT EYE WITH ASTIGMATISM: Primary | ICD-10-CM

## 2024-07-01 DIAGNOSIS — Z96.1 PSEUDOPHAKIA: ICD-10-CM

## 2024-07-01 DIAGNOSIS — H52.4 PRESBYOPIA: ICD-10-CM

## 2024-07-01 DIAGNOSIS — H04.129 DRY EYE: ICD-10-CM

## 2024-07-01 PROCEDURE — 92015 DETERMINE REFRACTIVE STATE: CPT | Performed by: OPTOMETRIST

## 2024-07-01 PROCEDURE — 92014 COMPRE OPH EXAM EST PT 1/>: CPT | Performed by: OPTOMETRIST

## 2024-07-01 ASSESSMENT — CONF VISUAL FIELD
OD_INFERIOR_NASAL_RESTRICTION: 0
OD_SUPERIOR_NASAL_RESTRICTION: 0
OD_SUPERIOR_TEMPORAL_RESTRICTION: 0
OS_SUPERIOR_NASAL_RESTRICTION: 0
OD_INFERIOR_TEMPORAL_RESTRICTION: 0
OD_NORMAL: 1
OS_NORMAL: 1
OS_INFERIOR_TEMPORAL_RESTRICTION: 0
OS_SUPERIOR_TEMPORAL_RESTRICTION: 0
OS_INFERIOR_NASAL_RESTRICTION: 0

## 2024-07-01 ASSESSMENT — EXTERNAL EXAM - RIGHT EYE: OD_EXAM: NORMAL

## 2024-07-01 ASSESSMENT — REFRACTION_MANIFEST
OD_AXIS: 150
OS_CYLINDER: +0.50
OD_SPHERE: -0.50
OS_AXIS: 167
OS_SPHERE: -2.75
OS_ADD: +2.50
OD_ADD: +2.50
OD_CYLINDER: +0.50

## 2024-07-01 ASSESSMENT — EXTERNAL EXAM - LEFT EYE: OS_EXAM: NORMAL

## 2024-07-01 ASSESSMENT — TONOMETRY
OS_IOP_MMHG: 17
IOP_METHOD: APPLANATION
OD_IOP_MMHG: 17

## 2024-07-01 ASSESSMENT — CUP TO DISC RATIO
OS_RATIO: 0.5
OD_RATIO: 0.5

## 2024-07-01 ASSESSMENT — VISUAL ACUITY
OS_SC: 20/80
OS_CC: J1
OD_SC: 20/20
METHOD: SNELLEN - LINEAR

## 2024-07-01 NOTE — LETTER
7/1/2024      Lolis Mcdonald  [address]   [address]  [place] [address]      Dear Colleague,    Thank you for referring your patient, Lolis Mcdonald, to the [facility]. Please see a copy of my visit note below.    Chief Complaint   Patient presents with     Annual Eye Exam        Last Eye Exam: 2023  Dilated Previously: Yes    What are you currently using to see?  glasses     For distance as needed / for reading as needed   Distance Vision Acuity: Satisfied with vision    Near Vision Acuity: Satisfied with vision while reading  with glasses    Eye Comfort: dry  Do you use eye drops? : Yes: artificial tears  daily pm          CE each eye Dr Vel Rondon / mono IOL    Medical, surgical and family histories reviewed and updated 7/1/2024.       OBJECTIVE: See Ophthalmology exam    ASSESSMENT:    ICD-10-CM    1. Myopia of left eye with astigmatism  H52.12 REFRACTION    H52.202 EYE EXAM (SIMPLE-NONBILLABLE)      2. Presbyopia  H52.4 REFRACTION     EYE EXAM (SIMPLE-NONBILLABLE)      3. Pseudophakia  Z96.1       4. Dry eye  H04.129           PLAN:   No prescription change needed     Lupe Hoff OD       Again, thank you for allowing me to participate in the care of your patient.        Sincerely,        Lupe Hoff, OD

## 2024-07-01 NOTE — PROGRESS NOTES
Chief Complaint   Patient presents with    Annual Eye Exam        Last Eye Exam: 2023  Dilated Previously: Yes    What are you currently using to see?  glasses     For distance as needed / for reading as needed   Distance Vision Acuity: Satisfied with vision    Near Vision Acuity: Satisfied with vision while reading  with glasses    Eye Comfort: dry  Do you use eye drops? : Yes: artificial tears  daily pm          CE each eye Dr Vel Rothmanasik / mono IOL    Medical, surgical and family histories reviewed and updated 7/1/2024.       OBJECTIVE: See Ophthalmology exam    ASSESSMENT:    ICD-10-CM    1. Myopia of left eye with astigmatism  H52.12 REFRACTION    H52.202 EYE EXAM (SIMPLE-NONBILLABLE)      2. Presbyopia  H52.4 REFRACTION     EYE EXAM (SIMPLE-NONBILLABLE)      3. Pseudophakia  Z96.1       4. Dry eye  H04.129           PLAN:   No prescription change needed     Lupe Hoff OD

## 2024-08-29 ENCOUNTER — PATIENT OUTREACH (OUTPATIENT)
Dept: CARE COORDINATION | Facility: CLINIC | Age: 74
End: 2024-08-29
Payer: COMMERCIAL

## 2024-09-05 ENCOUNTER — TRANSFERRED RECORDS (OUTPATIENT)
Dept: HEALTH INFORMATION MANAGEMENT | Facility: CLINIC | Age: 74
End: 2024-09-05
Payer: COMMERCIAL

## 2024-09-11 ENCOUNTER — TRANSFERRED RECORDS (OUTPATIENT)
Dept: HEALTH INFORMATION MANAGEMENT | Facility: CLINIC | Age: 74
End: 2024-09-11
Payer: COMMERCIAL

## 2024-09-12 ENCOUNTER — PATIENT OUTREACH (OUTPATIENT)
Dept: CARE COORDINATION | Facility: CLINIC | Age: 74
End: 2024-09-12
Payer: COMMERCIAL

## 2024-09-16 ENCOUNTER — OFFICE VISIT (OUTPATIENT)
Dept: VASCULAR SURGERY | Facility: CLINIC | Age: 74
End: 2024-09-16
Payer: COMMERCIAL

## 2024-09-16 DIAGNOSIS — I83.813 VARICOSE VEINS OF BILATERAL LOWER EXTREMITIES WITH PAIN: Primary | ICD-10-CM

## 2024-09-16 PROCEDURE — 99203 OFFICE O/P NEW LOW 30 MIN: CPT | Performed by: SURGERY

## 2024-09-16 NOTE — LETTER
2024      Lolis Mcdonald  [address] [facility] [address]      Dear Colleague,    Thank you for referring your patient, Lolis Mcdonald, to the [facility]. Please see a copy of my visit note below.    [facility] [place] Progress Note    Lolis Mcdonald presents in follow-up of bilateral lower extremity pain with recurrent right greater than left lower extremity varicose veins.  I last saw her in 2019 regarding the symptoms at which time we obtained an ultrasound which revealed right below-knee great saphenous vein incompetence.  Unfortunately, the symptoms have persisted and are especially troubling at night when she tries to get to sleep.  The aching pain as well as heaviness and throbbing impact her ability to perform her actives of daily living, especially when she is active playing pickle ball and other sports.  Her legs ache at night when she tries to get to sleep, make it difficult for her to get to sleep.    She has no history of deep vein thrombosis, superficial thrombophlebitis or hemorrhage.    Physical Exam  General: Pleasant female in no acute distress.   Extremities: Right lower extremity: Previous 4 mm varicosities over the posteromedial right calf.  3 mm veins over the right pretibial area.  No edema or stasis changes.    Left lower extremity: A few scattered telangiectasias and reticular veins but no significant stasis changes, edema or varicose veins.    Ultrasound:  Name:  Lolis Mcdonald                                                   Patient ID: [id number]  Date: 2021                                              : 1950  Sex: female                                                                 Examined by: JOSE MANUEL  Age:  70 year old                                                         Reading MD: MARRY     INDICATION:  Varicose veins of bilateral lower extremities with pain.  History of right GSV ablation  4/14/2010.     EXAM TYPE  BILATERAL LOWER EXTREMITY VENOUS DUPLEX FOR VENOUS INSUFFICIENCY  TECHNICAL SUMMARY     A duplex ultrasound study using color flow was performed to evaluate the bilateral lower extremity veins for valvular incompetence with the patient in a steep reversed trendelenberg.      RIGHT:     The deep veins demonstrate phasic flow, compress, and respond to augmentations.  There is no evidence of DVT.  The common femoral  vein is incompetent and free of thrombus. The remaining deep veins are competent and free of thrombus.      The SFJ is patent and competent.  The GSV is closed 12.4 mm from the SFJ to the knee.  The GSV is incompetent from the proximal to distal calf with the greatest reflux time of 6120 milliseconds.     The AASV is competent (2.6 mm) draining into the saphenofemoral junction.      The Giacomini vein is incompetent (2.7 mm)  from the distal to mid thigh before coursing deep to the fascia.      The SSV demonstrates phasic flow, compresses, and responds to augmentations from the popliteal space to the ankle.  No reflux or thrombus is seen. The saphenopopliteal junction is absent.      Perforators: There is an incompetent  vein (2.0 mm) at 17 cm from medial malleolus that communicates with a varicose vein.      LEFT:     The deep veins demonstrate phasic flow, compress, and respond to augmentations.  There is no evidence of DVT.  The common distal femoral vein is incompetent and free of thrombus. The remaining deep veins are competent and free of thrombus.      The GSV demonstrates phasic flow, compresses, and responds to augmentations from the saphenofemoral junction to the ankle with no evidence of thrombus. The greater saphenous vein measures 7.0 mm at the saphenofemoral junction and 5.1 mm distally. The GSV is incompetent from the SFJ to the proximal thigh with the greatest reflux time of 1996 milliseconds.      The AASV is competent (1.8 mm) draining into the  saphenofemoral junction.      The Giacomini vein is incompetent (2.0 mm) communicating with the small saphenous vein at the knee level and competent in the mid thigh.     The SSV demonstrates phasic flow, compresses and responds to augmentations from the popliteal space to the ankle.  No reflux or thrombus is seen. The saphenopopliteal junction is competent (2.2 mm).     Perforators: There is no evidence of incompetent  veins at any level.      FINAL SUMMARY:  1.         No evidence of bilateral lower extremity deep vein thrombus.  2.         Right common femoral vein incompetence.  Left distal femoral vein incompetence.  3.         Right greater saphenous vein incompetence in the calf.  4.         Right Giacomini vein incompetence.  5.         Right incompetent  vein.  6.         Right varicose vein incompetence.  7.         Left greater saphenous vein incompetence.  8.         Left distal Giacomini vein incompetence.  9.         The time of incompetence is greater than 500 milliseconds in length.    Assessment:  Ongoing, bilateral extremity pain with right greater than left lower extremity varicose veins.  We discussed lower extremity vein anatomy and the pathophysiology of venous insufficiency utilizing a leg vein drawing.  The option of continued conservative measures with compression, leg elevation, exercise and dietary measures was discussed.  She has pursued these measures but is bothered enough by the discomfort that she wants to see if things have changed with her insufficiency.    We will therefore obtain a bilateral lower extremity venous competency study,  results which could be discussed via a virtual visit.  We will base treatment on findings which could vary between ablation and sclerotherapy versus simple sclerotherapy alone of these veins.  Risks and details of the treatment were discussed.      Plan:  Bilateral extremity venous competency study with virtual visit to discuss  results      Leland Thomas MD    Dictated using Dragon voice recognition software which may result in transcription errors      Again, thank you for allowing me to participate in the care of your patient.        Sincerely,        Leland Thomas MD

## 2024-09-16 NOTE — PROGRESS NOTES
[facility] [place] Progress Note    Lolis Mcdonald presents in follow-up of bilateral lower extremity pain with recurrent right greater than left lower extremity varicose veins.  I last saw her in 2019 regarding the symptoms at which time we obtained an ultrasound which revealed right below-knee great saphenous vein incompetence.  Unfortunately, the symptoms have persisted and are especially troubling at night when she tries to get to sleep.  The aching pain as well as heaviness and throbbing impact her ability to perform her actives of daily living, especially when she is active playing pickle ball and other sports.  Her legs ache at night when she tries to get to sleep, make it difficult for her to get to sleep.    She has no history of deep vein thrombosis, superficial thrombophlebitis or hemorrhage.    Physical Exam  General: Pleasant female in no acute distress.   Extremities: Right lower extremity: Previous 4 mm varicosities over the posteromedial right calf.  3 mm veins over the right pretibial area.  No edema or stasis changes.    Left lower extremity: A few scattered telangiectasias and reticular veins but no significant stasis changes, edema or varicose veins.    Ultrasound:  Name:  Lolis Mcdonald                                                   Patient ID: [id number]  Date: 2021                                              : 1950  Sex: female                                                                 Examined by: JOSE MANUEL  Age:  70 year old                                                         Reading MD: MARRY     INDICATION:  Varicose veins of bilateral lower extremities with pain.  History of right GSV ablation 2010.     EXAM TYPE  BILATERAL LOWER EXTREMITY VENOUS DUPLEX FOR VENOUS INSUFFICIENCY  TECHNICAL SUMMARY     A duplex ultrasound study using color flow was performed to evaluate the bilateral lower extremity veins for valvular incompetence with  the patient in a steep reversed trendelenberg.      RIGHT:     The deep veins demonstrate phasic flow, compress, and respond to augmentations.  There is no evidence of DVT.  The common femoral  vein is incompetent and free of thrombus. The remaining deep veins are competent and free of thrombus.      The SFJ is patent and competent.  The GSV is closed 12.4 mm from the SFJ to the knee.  The GSV is incompetent from the proximal to distal calf with the greatest reflux time of 6120 milliseconds.     The AASV is competent (2.6 mm) draining into the saphenofemoral junction.      The Giacomini vein is incompetent (2.7 mm)  from the distal to mid thigh before coursing deep to the fascia.      The SSV demonstrates phasic flow, compresses, and responds to augmentations from the popliteal space to the ankle.  No reflux or thrombus is seen. The saphenopopliteal junction is absent.      Perforators: There is an incompetent  vein (2.0 mm) at 17 cm from medial malleolus that communicates with a varicose vein.      LEFT:     The deep veins demonstrate phasic flow, compress, and respond to augmentations.  There is no evidence of DVT.  The common distal femoral vein is incompetent and free of thrombus. The remaining deep veins are competent and free of thrombus.      The GSV demonstrates phasic flow, compresses, and responds to augmentations from the saphenofemoral junction to the ankle with no evidence of thrombus. The greater saphenous vein measures 7.0 mm at the saphenofemoral junction and 5.1 mm distally. The GSV is incompetent from the SFJ to the proximal thigh with the greatest reflux time of 1996 milliseconds.      The AASV is competent (1.8 mm) draining into the saphenofemoral junction.      The Giacomini vein is incompetent (2.0 mm) communicating with the small saphenous vein at the knee level and competent in the mid thigh.     The SSV demonstrates phasic flow, compresses and responds to augmentations from the  popliteal space to the ankle.  No reflux or thrombus is seen. The saphenopopliteal junction is competent (2.2 mm).     Perforators: There is no evidence of incompetent  veins at any level.      FINAL SUMMARY:  1.         No evidence of bilateral lower extremity deep vein thrombus.  2.         Right common femoral vein incompetence.  Left distal femoral vein incompetence.  3.         Right greater saphenous vein incompetence in the calf.  4.         Right Giacomini vein incompetence.  5.         Right incompetent  vein.  6.         Right varicose vein incompetence.  7.         Left greater saphenous vein incompetence.  8.         Left distal Giacomini vein incompetence.  9.         The time of incompetence is greater than 500 milliseconds in length.    Assessment:  Ongoing, bilateral extremity pain with right greater than left lower extremity varicose veins.  We discussed lower extremity vein anatomy and the pathophysiology of venous insufficiency utilizing a leg vein drawing.  The option of continued conservative measures with compression, leg elevation, exercise and dietary measures was discussed.  She has pursued these measures but is bothered enough by the discomfort that she wants to see if things have changed with her insufficiency.    We will therefore obtain a bilateral lower extremity venous competency study,  results which could be discussed via a virtual visit.  We will base treatment on findings which could vary between ablation and sclerotherapy versus simple sclerotherapy alone of these veins.  Risks and details of the treatment were discussed.      Plan:  Bilateral extremity venous competency study with virtual visit to discuss results      Leland Thomas MD    Dictated using Dragon voice recognition software which may result in transcription errors

## 2024-09-17 ENCOUNTER — TRANSFERRED RECORDS (OUTPATIENT)
Dept: HEALTH INFORMATION MANAGEMENT | Facility: CLINIC | Age: 74
End: 2024-09-17
Payer: COMMERCIAL

## 2024-09-23 ENCOUNTER — ANCILLARY PROCEDURE (OUTPATIENT)
Dept: MAMMOGRAPHY | Facility: CLINIC | Age: 74
End: 2024-09-23
Attending: PHYSICIAN ASSISTANT
Payer: COMMERCIAL

## 2024-09-23 DIAGNOSIS — Z12.31 VISIT FOR SCREENING MAMMOGRAM: ICD-10-CM

## 2024-09-23 PROCEDURE — 77067 SCR MAMMO BI INCL CAD: CPT | Mod: TC | Performed by: RADIOLOGY

## 2024-09-23 PROCEDURE — 77063 BREAST TOMOSYNTHESIS BI: CPT | Mod: TC | Performed by: RADIOLOGY

## 2024-09-26 ENCOUNTER — OFFICE VISIT (OUTPATIENT)
Dept: VASCULAR SURGERY | Facility: CLINIC | Age: 74
End: 2024-09-26
Attending: SURGERY
Payer: COMMERCIAL

## 2024-09-26 ENCOUNTER — ANCILLARY PROCEDURE (OUTPATIENT)
Dept: ULTRASOUND IMAGING | Facility: CLINIC | Age: 74
End: 2024-09-26
Attending: SURGERY
Payer: COMMERCIAL

## 2024-09-26 DIAGNOSIS — I83.813 VARICOSE VEINS OF BILATERAL LOWER EXTREMITIES WITH PAIN: Primary | ICD-10-CM

## 2024-09-26 DIAGNOSIS — I83.813 VARICOSE VEINS OF BILATERAL LOWER EXTREMITIES WITH PAIN: ICD-10-CM

## 2024-09-26 PROCEDURE — 99213 OFFICE O/P EST LOW 20 MIN: CPT | Performed by: SURGERY

## 2024-09-26 PROCEDURE — 93970 EXTREMITY STUDY: CPT | Performed by: SURGERY

## 2024-09-26 NOTE — LETTER
2024      Lolis Mcdonald  [id number] [address] [facility] [id number]      Dear Colleague,    Thank you for referring your patient, Lolis Mcdonald, to the [facility] [place]. Please see a copy of my visit note below.    [facility] [place] Progress Note    Lolis Mcdonald presents in follow-up of bilateral lower extremity pain with recurrent right greater than left lower extremity varicose veins.  Please see my consultation of 2024 for details.    She returned today for bilateral lower extremity venous competency studies.    Physical Exam  General: Pleasant female in no acute distress.  Blood pressure 139/64, pulse 67  Extremities: Right lower extremity: Previous 4 mm varicosities over the posteromedial right calf.  3 mm veins over the right pretibial area.  No edema or stasis changes.     Left lower extremity: A few scattered telangiectasias and reticular veins but no significant stasis changes, edema or varicose veins.    Ultrasound:  Name:  Lolis Mcdonald                                                   Patient ID: [id number]  Date: 2024                                         : 1950  Sex: female                                                                 Examined by: YOLANDA Oliva RVT  Age:  74 year old                                                         Reading MD: PENNY Thomas MD     INDICATION:       EXAM TYPE  BILATERAL LOWER EXTREMITY VENOUS DUPLEX FOR VENOUS INSUFFICIENCY  TECHNICAL SUMMARY     A duplex ultrasound study using color flow was performed, to evaluate the bilateral lower extremity veins for valvular incompetence with the patient in a steep reversed Trendelenburg.      RIGHT:     The deep veins demonstrate phasic flow, compress and respond to augmentations.  There is no reflux or DVT.       The GSV demonstrates phasic flow, compresses and responds to augmentations at the saphenofemoral junction and from the  knee to the ankle with no evidence of thrombus. The GSV is not visualized from proximal thigh to distal thigh. The great saphenous vein measures 5.3 mm at the saphenofemoral junction, and 4.2 mm at the knee. The GSV is incompetent from Knee to Proximal Calf, with the greatest reflux time of 8.6 seconds.  The GSV gives rise to a varicose branch measuring 3.7 mm off the Proximal Calf that courses Medial with a reflux time of 5.6 seconds.       The AASV is competent ( 3.9 mm) draining into the saphenofemoral junction.  The AASV gives rise to a varicose branch measuring 3.3 mm off the Distal Thigh that courses Medial with a reflux time of 3.9 seconds.       The Giacomini vein is competent ( 3.6 mm) communicating with the small saphenous vein at the knee level.      TThe SSV demonstrates phasic flow, compresses and responds to augmentations from the popliteal space to the ankle.  No thrombus is seen. The saphenopopliteal junction is absent. The SSV is incompetent at the Proximal Calf with a reflux time of 1.0 seconds.  The SSV is duplicated at the mid calf.     Perforators: There is no evidence of incompetent  veins at any level.         LEFT:     The deep veins demonstrate phasic flow, compress and respond to augmentations.  There is no DVT.  The common femoral vein is incompetent and free of thrombus. The remaining deep veins are competent and free of thrombus.      The GSV demonstrates phasic flow, compresses and responds to augmentations from the saphenofemoral junction to the ankle with no evidence of thrombus. The great saphenous vein measures 6.3 mm at the saphenofemoral junction, 4.5 at the proximal thigh, and 5.1 mm at the knee. The GSV is incompetent from SFJ to Proximal Thigh, with the greatest reflux time of 2.0 seconds.      The AASV is competent ( 3.5 mm) draining into the saphenofemoral junction.      The Giacomini vein is incompetent ( 3.2 mm) communicating with the small saphenous vein at the  knee level. The Giacomini vein is incompetent from the Proximal Thigh to Knee with a reflux time of 7.0 seconds. The Giacomini vein is tortuous in the distal thigh.     The SSV demonstrates phasic flow, compresses and responds to augmentations from the popliteal space to the ankle.  No reflux or thrombus is seen. The saphenopopliteal junction is competent ( 3.2 mm).      Perforators: There is no evidence of incompetent  veins at any level.         FINAL SUMMARY:  Right lower extremity:  Deep veins  No deep vein thrombosis or deep vein valve incompetence     Superficial veins  1.  Absent thigh great saphenous vein-previously treated  2.  The saphenofemoral junction and the great saphenous vein from the knee to the proximal calf is incompetent  3.  Proximal calf small saphenous vein incompetence  4.  Incompetent, small pelvic source varicose veins posteromedial thigh     Perforators  No incompetent perforators     Left lower extremity:  Deep veins  1.  No deep vein thrombosis  2.  Common femoral vein incompetence, otherwise deep veins are competent     Superficial veins  1.  Saphenofemoral junction through proximal thigh great saphenous vein incompetence  2.  Proximal to distal thigh Vein of Giacomini incompetence      veins  No incompetent perforators     Incompetence Criteria: Greater than 0.5 seconds in the superficial and  veins and greater than 1.0 second in the deep veins.     FRANCO Thomas MD, FACS     Assessment:  The lower extremity pain she is experiencing does not appear to be on the basis of significant venous insufficiency.  We discussed her lower extremity venous competency study utilizing a leg vein drawing.  I discussed the fact that the pain she describes is not in the area of her ultrasound findings and the risk from the findings is quite low.    Plan:  Continue conservative management with follow-up on an as-needed basis.    Leland Thomas MD,  FACS    Dictated using Dragon voice recognition software which may result in transcription errors          Again, thank you for allowing me to participate in the care of your patient.        Sincerely,        Leland Thomas MD

## 2024-09-26 NOTE — PROGRESS NOTES
[facility] [place] Progress Note    Lolis Mcdonald presents in follow-up of bilateral lower extremity pain with recurrent right greater than left lower extremity varicose veins.  Please see my consultation of 2024 for details.    She returned today for bilateral lower extremity venous competency studies.    Physical Exam  General: Pleasant female in no acute distress.  Blood pressure 139/64, pulse 67  Extremities: Right lower extremity: Previous 4 mm varicosities over the posteromedial right calf.  3 mm veins over the right pretibial area.  No edema or stasis changes.     Left lower extremity: A few scattered telangiectasias and reticular veins but no significant stasis changes, edema or varicose veins.    Ultrasound:  Name:  Lolis Mcdonald                                                   Patient ID: [id number]  Date: 2024                                         : 1950  Sex: female                                                                 Examined by: YOLANDA Oliva RVT  Age:  74 year old                                                         Reading MD: PENNY Thomas MD     INDICATION:       EXAM TYPE  BILATERAL LOWER EXTREMITY VENOUS DUPLEX FOR VENOUS INSUFFICIENCY  TECHNICAL SUMMARY     A duplex ultrasound study using color flow was performed, to evaluate the bilateral lower extremity veins for valvular incompetence with the patient in a steep reversed Trendelenburg.      RIGHT:     The deep veins demonstrate phasic flow, compress and respond to augmentations.  There is no reflux or DVT.       The GSV demonstrates phasic flow, compresses and responds to augmentations at the saphenofemoral junction and from the knee to the ankle with no evidence of thrombus. The GSV is not visualized from proximal thigh to distal thigh. The great saphenous vein measures 5.3 mm at the saphenofemoral junction, and 4.2 mm at the knee. The GSV is incompetent from Knee to  Proximal Calf, with the greatest reflux time of 8.6 seconds.  The GSV gives rise to a varicose branch measuring 3.7 mm off the Proximal Calf that courses Medial with a reflux time of 5.6 seconds.       The AASV is competent ( 3.9 mm) draining into the saphenofemoral junction.  The AASV gives rise to a varicose branch measuring 3.3 mm off the Distal Thigh that courses Medial with a reflux time of 3.9 seconds.       The Giacomini vein is competent ( 3.6 mm) communicating with the small saphenous vein at the knee level.      TThe SSV demonstrates phasic flow, compresses and responds to augmentations from the popliteal space to the ankle.  No thrombus is seen. The saphenopopliteal junction is absent. The SSV is incompetent at the Proximal Calf with a reflux time of 1.0 seconds.  The SSV is duplicated at the mid calf.     Perforators: There is no evidence of incompetent  veins at any level.         LEFT:     The deep veins demonstrate phasic flow, compress and respond to augmentations.  There is no DVT.  The common femoral vein is incompetent and free of thrombus. The remaining deep veins are competent and free of thrombus.      The GSV demonstrates phasic flow, compresses and responds to augmentations from the saphenofemoral junction to the ankle with no evidence of thrombus. The great saphenous vein measures 6.3 mm at the saphenofemoral junction, 4.5 at the proximal thigh, and 5.1 mm at the knee. The GSV is incompetent from SFJ to Proximal Thigh, with the greatest reflux time of 2.0 seconds.      The AASV is competent ( 3.5 mm) draining into the saphenofemoral junction.      The Giacomini vein is incompetent ( 3.2 mm) communicating with the small saphenous vein at the knee level. The Giacomini vein is incompetent from the Proximal Thigh to Knee with a reflux time of 7.0 seconds. The Giacomini vein is tortuous in the distal thigh.     The SSV demonstrates phasic flow, compresses and responds to augmentations  from the popliteal space to the ankle.  No reflux or thrombus is seen. The saphenopopliteal junction is competent ( 3.2 mm).      Perforators: There is no evidence of incompetent  veins at any level.         FINAL SUMMARY:  Right lower extremity:  Deep veins  No deep vein thrombosis or deep vein valve incompetence     Superficial veins  1.  Absent thigh great saphenous vein-previously treated  2.  The saphenofemoral junction and the great saphenous vein from the knee to the proximal calf is incompetent  3.  Proximal calf small saphenous vein incompetence  4.  Incompetent, small pelvic source varicose veins posteromedial thigh     Perforators  No incompetent perforators     Left lower extremity:  Deep veins  1.  No deep vein thrombosis  2.  Common femoral vein incompetence, otherwise deep veins are competent     Superficial veins  1.  Saphenofemoral junction through proximal thigh great saphenous vein incompetence  2.  Proximal to distal thigh Vein of Giacomini incompetence      veins  No incompetent perforators     Incompetence Criteria: Greater than 0.5 seconds in the superficial and  veins and greater than 1.0 second in the deep veins.     FRANCO Thomas MD, FACS     Assessment:  The lower extremity pain she is experiencing does not appear to be on the basis of significant venous insufficiency.  We discussed her lower extremity venous competency study utilizing a leg vein drawing.  I discussed the fact that the pain she describes is not in the area of her ultrasound findings and the risk from the findings is quite low.    Plan:  Continue conservative management with follow-up on an as-needed basis.    Leland Thomas MD, FACS    Dictated using Dragon voice recognition software which may result in transcription errors

## 2024-10-03 ENCOUNTER — TRANSFERRED RECORDS (OUTPATIENT)
Dept: HEALTH INFORMATION MANAGEMENT | Facility: CLINIC | Age: 74
End: 2024-10-03
Payer: COMMERCIAL

## 2024-10-31 ENCOUNTER — TRANSFERRED RECORDS (OUTPATIENT)
Dept: HEALTH INFORMATION MANAGEMENT | Facility: CLINIC | Age: 74
End: 2024-10-31
Payer: COMMERCIAL

## 2024-11-02 ASSESSMENT — SLEEP AND FATIGUE QUESTIONNAIRES
HOW LIKELY ARE YOU TO NOD OFF OR FALL ASLEEP WHEN YOU ARE A PASSENGER IN A CAR FOR AN HOUR WITHOUT A BREAK: WOULD NEVER DOZE
HOW LIKELY ARE YOU TO NOD OFF OR FALL ASLEEP WHILE WATCHING TV: SLIGHT CHANCE OF DOZING
HOW LIKELY ARE YOU TO NOD OFF OR FALL ASLEEP WHILE SITTING AND TALKING TO SOMEONE: WOULD NEVER DOZE
HOW LIKELY ARE YOU TO NOD OFF OR FALL ASLEEP WHILE SITTING QUIETLY AFTER LUNCH WITHOUT ALCOHOL: WOULD NEVER DOZE
HOW LIKELY ARE YOU TO NOD OFF OR FALL ASLEEP WHILE SITTING INACTIVE IN A PUBLIC PLACE: WOULD NEVER DOZE
HOW LIKELY ARE YOU TO NOD OFF OR FALL ASLEEP IN A CAR, WHILE STOPPED FOR A FEW MINUTES IN TRAFFIC: WOULD NEVER DOZE
HOW LIKELY ARE YOU TO NOD OFF OR FALL ASLEEP WHILE SITTING AND READING: SLIGHT CHANCE OF DOZING
HOW LIKELY ARE YOU TO NOD OFF OR FALL ASLEEP WHILE LYING DOWN TO REST IN THE AFTERNOON WHEN CIRCUMSTANCES PERMIT: MODERATE CHANCE OF DOZING

## 2024-11-05 ENCOUNTER — OFFICE VISIT (OUTPATIENT)
Dept: SLEEP MEDICINE | Facility: CLINIC | Age: 74
End: 2024-11-05
Payer: COMMERCIAL

## 2024-11-05 VITALS
WEIGHT: 153 LBS | SYSTOLIC BLOOD PRESSURE: 115 MMHG | BODY MASS INDEX: 28.89 KG/M2 | OXYGEN SATURATION: 96 % | HEIGHT: 61 IN | HEART RATE: 70 BPM | DIASTOLIC BLOOD PRESSURE: 49 MMHG

## 2024-11-05 DIAGNOSIS — G47.33 OSA (OBSTRUCTIVE SLEEP APNEA): ICD-10-CM

## 2024-11-05 PROCEDURE — G2211 COMPLEX E/M VISIT ADD ON: HCPCS | Performed by: INTERNAL MEDICINE

## 2024-11-05 PROCEDURE — 99205 OFFICE O/P NEW HI 60 MIN: CPT | Performed by: INTERNAL MEDICINE

## 2024-11-05 NOTE — NURSING NOTE
"Chief Complaint   Patient presents with    Sleep Problem     Sleeping with mouth open- wake with dry mouth. Making puff sound       Initial /49   Pulse 70   Ht 1.549 m (5' 1\")   Wt 69.4 kg (153 lb)   SpO2 96%   BMI 28.91 kg/m   Estimated body mass index is 28.91 kg/m  as calculated from the following:    Height as of this encounter: 1.549 m (5' 1\").    Weight as of this encounter: 69.4 kg (153 lb).    Medication Reconciliation: complete  ESS 4  Neck circumference: 36 centimeters.  Clarisa Casper MA   "

## 2024-11-05 NOTE — PROGRESS NOTES
[facility]   Outpatient Sleep Medicine Consultation  November 5, 2024      Name: Lolis Mcdonald MRN# [id number]   Age: 74 year old YOB: 1950     Date of Consultation: November 5, 2024  Consultation is requested by: No referring provider defined for this encounter. No ref. provider found  Primary care provider: Edyta Degroot         Reason for Sleep Consult:     Lolis Mcdonald is a 74 year old female with a history of mild WENDY who is currently not on treatment who presents for a re-evaluation.         Assessment and Plan:     Summary Sleep Diagnoses:  Mild positional obstructive sleep apnea without hypoxemia previously on CPAP  Continues without daytime symptoms (only has dry mouth) and there has been no significant weight changes.  Reassured patient that WENDY is still likely mild and less likely to be related with any cardio-neurologic complications. No need to re-evaluate the severity of WENDY and treatment is not indicated       Summary Recommendations:      No orders of the defined types were placed in this encounter.      Summary Counseling:  See instructions    Counseling included a comprehensive review of diagnostic and therapeutic strategies as well as risks of inadequate therapy.  Educational materials provided in instructions.             History of Present Illness:   I had the pleasure of seeing Lolis Villarreal, who is a 69 year old female with mild sleep apnea substantially worse supine and without improvement with CPAP therapy who presents for re-evaluation of same.  She was previously seen by Dr. Piedra in 2019.  To briefly review, overall mild WENDY (AHI 10) with no significant hypoxemia and was started on CPAP. CPAP was not helpful and it was discontinued in 2019 since she had no associated hypoxemia and no cardiovascular risk factors.  In retrospect she felt that she was never sleepy during the day and her [place] Sleepiness Scale was normal at her first visit.  She has  having no difficulty initiating or maintaining sleep currently.   Today, she presents as she is concerned that WENDY severity may have worsened since 2017 and she also now has dry mouth although she doesn't snore.  Symptoms are unchanged from before.     PREVIOUS SLEEP STUDIES:  Date: 5/7/2014  AHI: 10.3 without hypoxemia at 168 pounds  Intervention: CPAP      SLEEP-WAKE SCHEDULE:       -Describes themself as neither a morning or night person;     -Naps - None    Lolis A Mcdonald    -ON WEEKDAYS, goes to sleep at 12:00 AM during the week; awakens  7:30 AM without an alarm; falls asleep in 30 minutes; has difficulty falling asleep.     -ON WEEKENDS, same as week days      -Awakens 2-4 times a night for 30 minutes before falling back to sleep; awakens to go to the bathroom, uncertain reasons, and anxiety        SCALES       SLEEP APNEA: Stopbang score - 1       INSOMNIA:  Insomnia severity score: 17        SLEEPINESS: Aurora sleepiness scale: 4 [normal < 11]   Drowsy driving/near accidents None   Consequences: None       PHQ9:     SLEEP COMPLAINTS:  Cardio-respiratory    Snoring- 0/week  Dyspnea- denies  Morning headaches or confusion-denies  Coexisting Lung disease: denies    Coexisting Heart disease: denies    Does patient have a bed partner: denies  Has bed partner been sleeping separately because of snoring:  denies            RLS Screen: When you try to relax in the evening or sleep at  night, do you ever have unpleasant, restless feelings in your  legs that can be relieved by walking or movement? denies    Periodic limb movement: denies    Narcolepsy:  denies sudden urges of sleep attacks  Cataplexy:  denies   Sleep paralysis:  denies    Hallucinations:   denies       Sleep Behaviors:  Leg symptoms/movements: denies  Motor restlessness:denies  Night terrors: denies  Bruxism: denies  Automatic behaviors: none    Other subjective complaints:  Anxiety or rumination denies  Pain and discomfort at  night: denies  Waking  up with heart pounding or racing: denies  GERD or aspiration:denies         Parasomnia:   NREM - denies recurrent persistent confusional arousal, night eating, sleep walking or sleep terrors   REM  - denies dream enactment; injuries   Safety: No issues             Medications:     Current Outpatient Medications   Medication Sig Dispense Refill    Calcium Carb-Cholecalciferol (CALCIUM 1000 + D) 1000-800 MG-UNIT TABS       cetirizine (ZYRTEC) 10 MG tablet Take 1 tablet (10 mg) by mouth every evening 30 tablet 1    CINNAMON PO Take 1,000 mg by mouth      Glucosamine-Chondroit-Vit C-Mn (GLUCOSAMINE CHONDROITIN 1500 COMPLEX) CAPS       magnesium gluconate (MAGONATE) 500 (27 Mg) MG tablet Take 1 tablet (500 mg) by mouth 2 times daily      melatonin 5 MG tablet Take 1 tablet (5 mg) by mouth nightly as needed for sleep      multivitamin (CENTRUM SILVER) tablet Take 1 tablet by mouth daily      Turmeric 500 MG CAPS       vitamin D3 (CHOLECALCIFEROL) 50 mcg (2000 units) tablet Take 1 tablet (50 mcg) by mouth daily       No current facility-administered medications for this visit.        Allergies   Allergen Reactions    Mold Anxiety, Headache, Shortness Of Breath and Visual Disturbance    Actical Difficulty breathing, Swelling and Visual Disturbance    Doxycycline      Itchy rash    Dust Mites     Tramadol Nausea            Past Medical History:     Does not need 02 supplement at night   Past Medical History:   Diagnosis Date    Arthritis     Chronic pain     sciatic pain left leg to toes    Numbness and tingling     left leg    PONV (postoperative nausea and vomiting)     hx nausea postop             Past Surgical History:    Previous upper airway surgery- Yes, septoplasty and turbinoplasty  Past Surgical History:   Procedure Laterality Date    COLONOSCOPY N/A 03/04/2021    Procedure: COLONOSCOPY;  Surgeon: Gerard Siddiqi MD;  Location:  GI    [facility] REFRACTIVE BILATERAL EXISTING PT IN PARAMETERS      EYE  SURGERY  2023    Cataract surgery    HEAD & NECK SURGERY      Hip/neck bone transfer fusion    HYSTERECTOMY, PAP NO LONGER INDICATED      SEPTOPLASTY, TURBINOPLASTY, COMBINED  2012    Procedure:COMBINED SEPTOPLASTY, TURBINOPLASTY; SEPTOPLASTY, TURBINATE Surgery ; Surgeon:OPAL FUENTES; Location:[facility] OR    titanium neck      titanium right hip Right     cut bone out of hip to place in neck, so needed plate in righthip    TONSILLECTOMY & ADENOIDECTOMY  age 6    VASCULAR SURGERY      [facility] NONSPECIFIC PROCEDURE      cervical spine surgery     [facility] NONSPECIFIC PROCEDURE      varacose carroll removal right            Social History:     Social History     Tobacco Use    Smoking status: Never    Smokeless tobacco: Never    Tobacco comments:     Never smoked   Substance Use Topics    Alcohol use: No         Chemical History:     Tobacco: Never      Uses 3 cups/day of tea. Last caffeine intake is usually before 7 pm    Supplements for wakefulness: None    EtOH: None None of the patient's responses to the CAGE screening were positive / Negative CAGE score  Recreational Drugs: None     Psych Hx:       2021    10:57 AM   PHQ   PHQ-9 Total Score 6   Q9: Thoughts of better off dead/self-harm past 2 weeks Not at all      Current dangers to self or others:none           Family History:     Family History   Problem Relation Age of Onset    Cancer - colorectal Mother         born 1927 cancer 75yo and arthritis and thyroid    Alzheimer Disease Mother         Alzheimers/dementia    Cerebrovascular Disease Mother 84        age 81    Colon Cancer Mother     Heart Disease Father         born 1927 and HTN     Cancer Father 84        brain/lung cancer    Prostate Cancer Father     Family History Negative Maternal Grandmother          83yo    Diabetes Maternal Grandfather         dceased 75yo    Arthritis Paternal Grandmother          91yo    Cancer Paternal Grandfather          88yo stomach     "Diabetes Sister     Sjogren's Sister 69    Breast Cancer Sister 74    Arthritis Son         rheumatoid arthritis    Family History Negative Daughter     Asthma Daughter     Family History Negative Daughter     Respiratory Daughter         asthma    Macular Degeneration Maternal Aunt     Arthritis Other     Glaucoma No family hx of             Review of Systems:     A complete 10 point review of systems was negative other than HPI or as commented below:   Lolis Mcdonald has gained 5-10 pounds in 10 years.               Physical Examination:     /49   Pulse 70   Ht 1.549 m (5' 1\")   Wt 69.4 kg (153 lb)   SpO2 96%   BMI 28.91 kg/m    Exam:  Constitutional: healthy, alert, and no distress  Head: Normocephalic. No masses, lesions, tenderness or abnormalities  ENT: ENT exam normal, no neck nodes or sinus tenderness  Cardiovascular: negative, PMI normal. No lifts, heaves, or thrills. RRR. No murmurs, clicks gallops or rub  Respiratory: negative, Percussion normal. Good diaphragmatic excursion. Lungs clear  Gastrointestinal: Abdomen soft, non-tender. BS normal. No masses, organomegaly  : Deferred  Musculoskeletal: extremities normal- no gross deformities noted, gait normal, and normal muscle tone  Skin: no suspicious lesions or rashes  Neurologic: Gait normal. Reflexes normal and symmetric. Sensation grossly WNL.  Psychiatric: mentation appears normal and affect normal/bright            Data: All pertinent previous laboratory data reviewed     No results found for: \"PH\", \"PHARTERIAL\", \"PO2\", \"[id number]\", \"SAT\", \"PCO2\", \"HCO3\", \"BASEEXCESS\", \"SHIMA\", \"BEB\"  Lab Results   Component Value Date    TSH 2.11 09/12/2023    TSH 2.21 07/13/2022     Lab Results   Component Value Date    GLC 89 09/14/2023    GLC 95 09/12/2023     Lab Results   Component Value Date    HGB 14.6 09/12/2023    HGB 13.6 07/13/2022     Lab Results   Component Value Date    BUN 17.5 09/14/2023    BUN 22.8 09/12/2023    CR 0.76 09/14/2023    CR " "0.86 09/12/2023     Lab Results   Component Value Date    AST 22 09/12/2023    AST 9 07/13/2022    ALT 21 09/12/2023    ALT 21 07/13/2022    ALKPHOS 73 09/12/2023    ALKPHOS 62 07/13/2022    BILITOTAL 0.2 09/12/2023    BILITOTAL 0.4 07/13/2022     No results found for: \"UAMP\", \"UBARB\", \"BENZODIAZEUR\", \"UCANN\", \"UCOC\", \"OPIT\", \"UPCP\"      Copy to: Edyta Degroot MD 11/5/2024     RTC as needed    I spent 60 minutes on the date of the encounter doing chart review, history and exam, documentation and further coordination as noted above exclusive of time interpreting PFT, Chest Xray, CT Chest.          "

## 2024-11-16 ENCOUNTER — HEALTH MAINTENANCE LETTER (OUTPATIENT)
Age: 74
End: 2024-11-16

## 2024-12-24 ENCOUNTER — TRANSFERRED RECORDS (OUTPATIENT)
Dept: HEALTH INFORMATION MANAGEMENT | Facility: CLINIC | Age: 74
End: 2024-12-24
Payer: COMMERCIAL

## 2025-01-01 ENCOUNTER — PATIENT OUTREACH (OUTPATIENT)
Dept: CARE COORDINATION | Facility: CLINIC | Age: 75
End: 2025-01-01
Payer: COMMERCIAL

## 2025-01-17 ENCOUNTER — TRANSFERRED RECORDS (OUTPATIENT)
Dept: HEALTH INFORMATION MANAGEMENT | Facility: CLINIC | Age: 75
End: 2025-01-17
Payer: COMMERCIAL

## 2025-03-08 ENCOUNTER — HEALTH MAINTENANCE LETTER (OUTPATIENT)
Age: 75
End: 2025-03-08

## 2025-04-14 ENCOUNTER — TRANSFERRED RECORDS (OUTPATIENT)
Dept: HEALTH INFORMATION MANAGEMENT | Facility: CLINIC | Age: 75
End: 2025-04-14
Payer: COMMERCIAL

## 2025-05-16 ENCOUNTER — TRANSFERRED RECORDS (OUTPATIENT)
Dept: HEALTH INFORMATION MANAGEMENT | Facility: CLINIC | Age: 75
End: 2025-05-16
Payer: COMMERCIAL

## 2025-05-29 ENCOUNTER — TRANSFERRED RECORDS (OUTPATIENT)
Dept: HEALTH INFORMATION MANAGEMENT | Facility: CLINIC | Age: 75
End: 2025-05-29
Payer: COMMERCIAL

## 2025-07-10 ENCOUNTER — OFFICE VISIT (OUTPATIENT)
Dept: OPTOMETRY | Facility: CLINIC | Age: 75
End: 2025-07-10
Payer: COMMERCIAL

## 2025-07-10 DIAGNOSIS — H52.12 MYOPIA OF LEFT EYE WITH ASTIGMATISM: Primary | ICD-10-CM

## 2025-07-10 DIAGNOSIS — Z96.1 PSEUDOPHAKIA: ICD-10-CM

## 2025-07-10 DIAGNOSIS — H52.202 MYOPIA OF LEFT EYE WITH ASTIGMATISM: Primary | ICD-10-CM

## 2025-07-10 DIAGNOSIS — H52.4 PRESBYOPIA: ICD-10-CM

## 2025-07-10 DIAGNOSIS — H04.129 DRY EYE: ICD-10-CM

## 2025-07-10 ASSESSMENT — CONF VISUAL FIELD
OD_INFERIOR_TEMPORAL_RESTRICTION: 0
OS_NORMAL: 1
OS_SUPERIOR_TEMPORAL_RESTRICTION: 0
OS_INFERIOR_TEMPORAL_RESTRICTION: 0
OD_NORMAL: 1
OD_INFERIOR_NASAL_RESTRICTION: 0
OS_SUPERIOR_NASAL_RESTRICTION: 0
OS_INFERIOR_NASAL_RESTRICTION: 0
OD_SUPERIOR_NASAL_RESTRICTION: 0
OD_SUPERIOR_TEMPORAL_RESTRICTION: 0
METHOD: COUNTING FINGERS

## 2025-07-10 ASSESSMENT — KERATOMETRY
OS_AXISANGLE2_DEGREES: 172
OD_K2POWER_DIOPTERS: 44.37
OD_AXISANGLE2_DEGREES: 178
OD_AXISANGLE_DEGREES: 088
OS_K1POWER_DIOPTERS: 45.87
OS_K2POWER_DIOPTERS: 46.25
OS_AXISANGLE_DEGREES: 082
OD_K1POWER_DIOPTERS: 44.00

## 2025-07-10 ASSESSMENT — REFRACTION_MANIFEST
OS_SPHERE: -2.25
OD_AXIS: 163
OD_CYLINDER: +0.25
METHOD_AUTOREFRACTION: 1
OS_CYLINDER: +0.25
OS_AXIS: 165
OD_CYLINDER: +0.50
OS_CYLINDER: +0.50
OD_SPHERE: -0.50
OD_ADD: +2.50
OS_ADD: +2.50
OD_AXIS: 153
OS_AXIS: 163
OS_SPHERE: -2.75
OD_SPHERE: -0.25

## 2025-07-10 ASSESSMENT — VISUAL ACUITY
OS_SC: 20/150
METHOD: SNELLEN - LINEAR
OS_SC: 20/20 -2
OD_SC+: -1
OD_SC: 20/20
OD_SC: 20/30 -1

## 2025-07-10 ASSESSMENT — TONOMETRY
IOP_METHOD: APPLANATION
OD_IOP_MMHG: 14
OS_IOP_MMHG: 14

## 2025-07-10 ASSESSMENT — CUP TO DISC RATIO
OD_RATIO: 0.5
OS_RATIO: 0.5

## 2025-07-10 ASSESSMENT — EXTERNAL EXAM - LEFT EYE: OS_EXAM: NORMAL

## 2025-07-10 ASSESSMENT — EXTERNAL EXAM - RIGHT EYE: OD_EXAM: NORMAL

## 2025-07-10 NOTE — PROGRESS NOTES
Chief Complaint   Patient presents with    Annual Eye Exam         Last Eye Exam: 07/01/2024  Dilated Previously: Yes    What are you currently using to see?  Glasses SVL - for driving only, not with       Distance Vision Acuity: Satisfied with vision    Near Vision Acuity: Satisfied with vision while reading and using computer unaided    Eye Comfort: watery  Do you use eye drops? : Yes: Systane Complete Ats PRN  Occupation or Hobbies: Retired    Viktoria Mittal    Optometric Assistant        History of monovision IOL    Medical, surgical and family histories reviewed and updated 7/10/2025.       OBJECTIVE: See Ophthalmology exam    ASSESSMENT:    ICD-10-CM    1. Myopia of left eye with astigmatism  H52.12 EYE EXAM (SIMPLE-NONBILLABLE)    H52.202 REFRACTION      2. Presbyopia  H52.4 EYE EXAM (SIMPLE-NONBILLABLE)     REFRACTION      3. Pseudophakia  Z96.1       4. Dry eye  H04.129           PLAN:   No change needed  Lupe Hoff OD

## 2025-07-10 NOTE — LETTER
7/10/2025      Lolis Mcdonald  [address] [facility] [address]      Dear Colleague,    Thank you for referring your patient, Lolis Mcdonald, to the [facility]. Please see a copy of my visit note below.    Chief Complaint   Patient presents with     Annual Eye Exam         Last Eye Exam: 07/01/2024  Dilated Previously: Yes    What are you currently using to see?  Glasses SVL - for driving only, not with       Distance Vision Acuity: Satisfied with vision    Near Vision Acuity: Satisfied with vision while reading and using computer unaided    Eye Comfort: watery  Do you use eye drops? : Yes: Systane Complete Ats PRN  Occupation or Hobbies: Retired    Viktoria Mittal    Optometric Assistant        History of monovision IOL    Medical, surgical and family histories reviewed and updated 7/10/2025.       OBJECTIVE: See Ophthalmology exam    ASSESSMENT:    ICD-10-CM    1. Myopia of left eye with astigmatism  H52.12 EYE EXAM (SIMPLE-NONBILLABLE)    H52.202 REFRACTION      2. Presbyopia  H52.4 EYE EXAM (SIMPLE-NONBILLABLE)     REFRACTION      3. Pseudophakia  Z96.1       4. Dry eye  H04.129           PLAN:   No change needed  Lupe Hoff OD     Again, thank you for allowing me to participate in the care of your patient.        Sincerely,        Lupe Hoff, OD    Electronically signed

## 2025-09-03 ENCOUNTER — TRANSFERRED RECORDS (OUTPATIENT)
Dept: HEALTH INFORMATION MANAGEMENT | Facility: CLINIC | Age: 75
End: 2025-09-03
Payer: COMMERCIAL

## (undated) RX ORDER — FENTANYL CITRATE 50 UG/ML
INJECTION, SOLUTION INTRAMUSCULAR; INTRAVENOUS
Status: DISPENSED
Start: 2021-03-04